# Patient Record
Sex: FEMALE | Race: WHITE | NOT HISPANIC OR LATINO | Employment: OTHER | URBAN - METROPOLITAN AREA
[De-identification: names, ages, dates, MRNs, and addresses within clinical notes are randomized per-mention and may not be internally consistent; named-entity substitution may affect disease eponyms.]

---

## 2017-01-25 ENCOUNTER — LAB VISIT (OUTPATIENT)
Dept: LAB | Facility: HOSPITAL | Age: 27
End: 2017-01-25
Attending: INTERNAL MEDICINE
Payer: COMMERCIAL

## 2017-01-25 DIAGNOSIS — C73 THYROID CANCER: ICD-10-CM

## 2017-01-25 DIAGNOSIS — E89.0 POSTOPERATIVE HYPOTHYROIDISM: ICD-10-CM

## 2017-01-25 LAB
T3FREE SERPL-MCNC: 2.3 PG/ML
T4 FREE SERPL-MCNC: 1.17 NG/DL
TSH SERPL DL<=0.005 MIU/L-ACNC: <0.01 UIU/ML

## 2017-01-25 PROCEDURE — 84439 ASSAY OF FREE THYROXINE: CPT

## 2017-01-25 PROCEDURE — 84481 FREE ASSAY (FT-3): CPT

## 2017-01-25 PROCEDURE — 36415 COLL VENOUS BLD VENIPUNCTURE: CPT | Mod: PO

## 2017-01-25 PROCEDURE — 84443 ASSAY THYROID STIM HORMONE: CPT

## 2017-03-08 ENCOUNTER — PATIENT MESSAGE (OUTPATIENT)
Dept: ENDOCRINOLOGY | Facility: CLINIC | Age: 27
End: 2017-03-08

## 2017-03-08 DIAGNOSIS — E89.0 POST-SURGICAL HYPOTHYROIDISM: Primary | ICD-10-CM

## 2017-03-09 ENCOUNTER — LAB VISIT (OUTPATIENT)
Dept: LAB | Facility: HOSPITAL | Age: 27
End: 2017-03-09
Attending: INTERNAL MEDICINE
Payer: COMMERCIAL

## 2017-03-09 DIAGNOSIS — E89.0 POST-SURGICAL HYPOTHYROIDISM: ICD-10-CM

## 2017-03-09 LAB
T4 FREE SERPL-MCNC: 1.23 NG/DL
TSH SERPL DL<=0.005 MIU/L-ACNC: <0.01 UIU/ML

## 2017-03-09 PROCEDURE — 84439 ASSAY OF FREE THYROXINE: CPT

## 2017-03-09 PROCEDURE — 84443 ASSAY THYROID STIM HORMONE: CPT

## 2017-03-09 PROCEDURE — 36415 COLL VENOUS BLD VENIPUNCTURE: CPT | Mod: PO

## 2017-03-18 ENCOUNTER — PATIENT MESSAGE (OUTPATIENT)
Dept: ENDOCRINOLOGY | Facility: CLINIC | Age: 27
End: 2017-03-18

## 2017-03-18 DIAGNOSIS — E03.9 HYPOTHYROIDISM, UNSPECIFIED TYPE: Primary | ICD-10-CM

## 2017-03-20 RX ORDER — LIOTHYRONINE SODIUM 5 UG/1
TABLET ORAL
Qty: 90 TABLET | Refills: 3 | Status: SHIPPED | OUTPATIENT
Start: 2017-03-20 | End: 2017-04-17

## 2017-04-10 ENCOUNTER — HOSPITAL ENCOUNTER (OUTPATIENT)
Dept: RADIOLOGY | Facility: HOSPITAL | Age: 27
Discharge: HOME | End: 2017-04-10
Attending: INTERNAL MEDICINE
Payer: COMMERCIAL

## 2017-04-10 DIAGNOSIS — E89.0 POSTOPERATIVE HYPOTHYROIDISM: ICD-10-CM

## 2017-04-10 DIAGNOSIS — C73 THYROID CANCER: ICD-10-CM

## 2017-04-10 PROCEDURE — 76536 US EXAM OF HEAD AND NECK: CPT | Mod: TC,PO

## 2017-04-10 PROCEDURE — 76536 US EXAM OF HEAD AND NECK: CPT | Mod: 26,,, | Performed by: RADIOLOGY

## 2017-04-17 ENCOUNTER — OFFICE VISIT (OUTPATIENT)
Dept: ENDOCRINOLOGY | Facility: CLINIC | Age: 27
End: 2017-04-17
Payer: COMMERCIAL

## 2017-04-17 VITALS
SYSTOLIC BLOOD PRESSURE: 106 MMHG | HEIGHT: 62 IN | BODY MASS INDEX: 18.68 KG/M2 | WEIGHT: 101.5 LBS | DIASTOLIC BLOOD PRESSURE: 68 MMHG | HEART RATE: 72 BPM

## 2017-04-17 DIAGNOSIS — E03.9 HYPOTHYROIDISM, UNSPECIFIED TYPE: ICD-10-CM

## 2017-04-17 DIAGNOSIS — C73 THYROID CANCER: Primary | ICD-10-CM

## 2017-04-17 PROCEDURE — 99999 PR PBB SHADOW E&M-EST. PATIENT-LVL III: CPT | Mod: PBBFAC,,, | Performed by: INTERNAL MEDICINE

## 2017-04-17 PROCEDURE — 99214 OFFICE O/P EST MOD 30 MIN: CPT | Mod: S$GLB,,, | Performed by: INTERNAL MEDICINE

## 2017-04-17 PROCEDURE — 1160F RVW MEDS BY RX/DR IN RCRD: CPT | Mod: S$GLB,,, | Performed by: INTERNAL MEDICINE

## 2017-04-17 RX ORDER — THYROID 90 MG/1
90 TABLET ORAL DAILY
Qty: 30 TABLET | Refills: 6 | Status: SHIPPED | OUTPATIENT
Start: 2017-04-17 | End: 2017-06-04

## 2017-04-17 NOTE — MR AVS SNAPSHOT
[facility]  [address]  [facility] [address]  Phone: [phone]  Fax: [phone]                  Idalia Lee   2017 1:00 PM   Office Visit    Description:  Female : 1990   Provider:  Bradly Rosa DO   Department:  [facility] - Endocrinology           Reason for Visit     Thyroid Cancer           Diagnoses this Visit        Comments    Thyroid cancer    -  Primary     Hypothyroidism, unspecified type                To Do List           Future Appointments        Provider Department Dept Phone    2017 10:35 AM LAB, [place] [facility] [phone]    2017 10:00 AM LAB, [place] [facility] [phone]    2017 10:00 AM LAB, [place] [facility] [phone]    10/5/2017 9:30 AM Bradly Rosa DO [facility] [phone]      Goals (5 Years of Data)     None       These Medications        Disp Refills Start End    thyroid, pork, (ARMOUR THYROID) 90 mg Tab 30 tablet 6 2017    Take 1 tablet (90 mg total) by mouth once daily. - Oral    Pharmacy: [id number] [address] #: [phone]         [facility]     [facility] Nurse Care Line -  Assistance  Unless otherwise directed by your provider, please contact [facility], our nurse care line that is available for  assistance.     Registered nurses in the [facility] provide: appointment scheduling, clinical advisement, health education, and other advisory services.  Call: [phone] (toll free)               Medications           START taking these NEW medications        Refills    thyroid, pork, (ARMOUR THYROID) 90 mg Tab 6    Sig: Take 1 tablet (90 mg total) by mouth once daily.    Class: Normal    Route: Oral      STOP taking these medications     SYNTHROID 125 mcg tablet TAKE 1 TABLET DAILY 6 DAYS A WEEK AND ONE-HALF TABLET  "ON DAY 7    liothyronine (CYTOMEL) 5 MCG Tab 10 mg in AM           Verify that the below list of medications is an accurate representation of the medications you are currently taking.  If none reported, the list may be blank. If incorrect, please contact your healthcare provider. Carry this list with you in case of emergency.           Current Medications     multivitamin capsule Take 1 capsule by mouth once daily.    thyroid, pork, (ARMOUR THYROID) 90 mg Tab Take 1 tablet (90 mg total) by mouth once daily.           Clinical Reference Information           Your Vitals Were     BP Pulse Height Weight BMI    106/68 (BP Location: Right arm, Patient Position: Sitting, BP Method: Manual) 72 5' 2" (1.575 m) 46.1 kg (101 lb 8.4 oz) 18.57 kg/m2      Blood Pressure          Most Recent Value    BP  106/68      Allergies as of 4/17/2017     E-mycin [Erythromycin]    Sulfa (Sulfonamide Antibiotics)    Ceclor [Cefaclor]      Immunizations Administered on Date of Encounter - 4/17/2017     None      Orders Placed During Today's Visit     Future Labs/Procedures Expected by Expires    Thyroglobulin  4/17/2017 4/17/2018    Recurring Lab Work Interval Expires    T3, free   4/17/2018    T4, free   4/17/2018    TSH   4/17/2018      Language Assistance Services     ATTENTION: Language assistance services are available, free of charge. Please call [phone].      ATENCIÓN: Si habla español, tiene a cesar disposición servicios gratuitos de asistencia lingüística. Llame al [phone].     CHÚ Ý: N?u b?n nói Ti?ng Vi?t, có các d?ch v? h? tr? ngôn ng? mi?n phí dành cho b?n. G?i s? [phone].         [facility] complies with applicable Federal civil rights laws and does not discriminate on the basis of race, color, national origin, age, disability, or sex.        "

## 2017-04-17 NOTE — PROGRESS NOTES
CHIEF COMPLAINT: Papillary Thyroid Cancer   27 year old being seen as a f/u. S/P total thyroidectomy 6/13. She received 100 mCi on 9/13 (She received tracer scan prior to determine dose). On synthroid 125 mcg 6 days a week and 1/2 tablet on day 7 and cytomel 10 mcg daily. She is having some fatigue. No palpitations. No tremors.                   FINAL PATHOLOGIC DIAGNOSIS   1. 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   2. THYROID (LEFT LOBE, CLINICAL): NO TUMOR SEEN.   THYROID CANCER CASE SUMMARY   Thyroid gland resection   Procedure: right lobectomy   Specimen integrity: separate right and left lobes   Specimen size: 6.0 cm   Tumor focality: single focus   Tumor laterality: right   Tumor size: 2.0 cm   Histologic type: papillary carcinoma, tall cell variant   Margins: not involved; closest margin < 0.1cm   Tumor capsule: totally encapsulated   Tumor capsular invasion: minimally invasive   Lymph-vascular invasion: not identified   Extrathyroid extension: not identified   Pathologic stage: I (pT1b NX)     PAST MEDICAL HISTORY/PAST SURGICAL HISTORY: Reviewed in [employer]     SOCIAL HISTORY: No T/A. .     FAMILY HISTORY: No thyroid cancer. Distant relative had hyperthyroidism.     MEDICATIONS/ALLERGIES: The patient's MedCard has been updated and reviewed.       ROS:   Constitutional: No fatigue.    Eyes: No recent visual changes   ENT: No dysphagia   Cardiovascular: Denies current anginal symptoms   Respiratory: Denies current respiratory difficulty   Gastrointestinal: No N/V   GenitoUrinary - No dysuria   Skin: No new skin rash   Neurologic: No focal neurologic complaints   Remainder ROS negative       PE:   GENERAL: Well developed, well nourished.   PSYCH: appropriate mood and affect   EYES: PERRL, EOM intact.   ENT: Nares patent, oropharynx clear, mucosa pink,   NECK: Supple, trachea midline, surgical scar intact. No LAD  CHEST: Resp even and unlabored, CTA bilateral.    CARDIAC: RRR, S1, S2 heard, no murmurs, rubs, S3, or S4     Results for CARLITA WORRELL (MRN [id number]) as of 4/17/2017 13:17   Ref. Range 4/10/2017 08:09   TSH Latest Ref Range: 0.400 - 4.000 uIU/mL <0.010 (L)   T3, Free Latest Ref Range: 2.3 - 4.2 pg/mL 2.5   Free T4 Latest Ref Range: 0.71 - 1.51 ng/dL 1.17   Thyroglobulin Interpretation Unknown SEE BELOW   Thyroglobulin Antibody Screen Latest Ref Range: <4.0 IU/mL 84 (H)   Thyroglobulin, Tumor Marker Latest Units: ng/mL <0.1     THYROID US:  Technique: Ultrasound interrogation of the thyroid gland was performed utilizing grayscale and color-flow imaging.    Comparison: Thyroid ultrasound-6/24/2016    Findings:    The patient is status post total thyroidectomy. No recurrent nodule or residual thyroid tissue in the thyroid fossa.  No pathologically enlarged lymph nodes in the left or right neck.       Impression       Status post total thyroidectomy.  No residual thyroid tissue or recurrent nodule in the thyroid fossa.                ASSESSMENT/PLAN:   1. 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 TG Ab +. With minimal invasion of capsule but within margins. S/P 100 mCi. Post Tx WBS was normal. Her Tg Ab are elevated. 1 year WBS shows no iodine avid disease. AB were increased, so PET scan done 10/14 which was negative. Tg decreased. Also seeing Dr. Sharp who does natural medicine. She would like to take armour. Discussed risks and benefits of armour especially with thyroid cancer. Also discussed risks of suppressed TSH. Will change to armour 90 mg. If Tg Ab increased will need to change back.     2. Hypothyroidism- See # 1. Monitor for hyperthyroid symptoms.     FOLLOWUP:  TSH, Ft4, Ft3- 6 weeks  F/U 6 months with TSH, FT4, Ft3, Tg

## 2017-04-30 ENCOUNTER — PATIENT MESSAGE (OUTPATIENT)
Dept: ENDOCRINOLOGY | Facility: CLINIC | Age: 27
End: 2017-04-30

## 2017-05-29 ENCOUNTER — PATIENT MESSAGE (OUTPATIENT)
Dept: ENDOCRINOLOGY | Facility: CLINIC | Age: 27
End: 2017-05-29

## 2017-06-02 ENCOUNTER — LAB VISIT (OUTPATIENT)
Dept: LAB | Facility: HOSPITAL | Age: 27
End: 2017-06-02
Attending: INTERNAL MEDICINE
Payer: COMMERCIAL

## 2017-06-02 DIAGNOSIS — E03.9 HYPOTHYROIDISM, UNSPECIFIED TYPE: ICD-10-CM

## 2017-06-02 LAB
T3FREE SERPL-MCNC: 3.4 PG/ML
T4 FREE SERPL-MCNC: 1.24 NG/DL
TSH SERPL DL<=0.005 MIU/L-ACNC: <0.01 UIU/ML

## 2017-06-02 PROCEDURE — 84481 FREE ASSAY (FT-3): CPT

## 2017-06-02 PROCEDURE — 84439 ASSAY OF FREE THYROXINE: CPT

## 2017-06-02 PROCEDURE — 84443 ASSAY THYROID STIM HORMONE: CPT

## 2017-06-02 PROCEDURE — 36415 COLL VENOUS BLD VENIPUNCTURE: CPT | Mod: PO

## 2017-06-04 ENCOUNTER — TELEPHONE (OUTPATIENT)
Dept: ENDOCRINOLOGY | Facility: CLINIC | Age: 27
End: 2017-06-04

## 2017-06-04 DIAGNOSIS — E03.9 HYPOTHYROIDISM, UNSPECIFIED TYPE: Primary | ICD-10-CM

## 2017-06-04 RX ORDER — THYROID 60 MG/1
60 TABLET ORAL DAILY
Qty: 30 TABLET | Refills: 3 | Status: SHIPPED | OUTPATIENT
Start: 2017-06-04 | End: 2017-06-17

## 2017-06-04 NOTE — TELEPHONE ENCOUNTER
Let her know on too much armour. Decrease from [place] 90 mg to 60 mg daily. Check TSH, FT4, FT3 6 weeks

## 2017-06-05 ENCOUNTER — TELEPHONE (OUTPATIENT)
Dept: ENDOCRINOLOGY | Facility: CLINIC | Age: 27
End: 2017-06-05

## 2017-06-05 NOTE — TELEPHONE ENCOUNTER
----- Message from Melisa Mccurdy sent at 6/5/2017  3:18 PM CDT -----  Contact: self   Placed call to pod, patient missed call from your office please call back at [phone] (rjis)

## 2017-06-05 NOTE — TELEPHONE ENCOUNTER
Spoke with pt, scheduled 6 week lab repeat, aware of new dose of [place], requested labs to be release in [facility], released as requested.

## 2017-06-14 ENCOUNTER — PATIENT MESSAGE (OUTPATIENT)
Dept: ENDOCRINOLOGY | Facility: CLINIC | Age: 27
End: 2017-06-14

## 2017-06-14 DIAGNOSIS — E03.9 HYPOTHYROIDISM, UNSPECIFIED TYPE: Primary | ICD-10-CM

## 2017-06-15 ENCOUNTER — LAB VISIT (OUTPATIENT)
Dept: LAB | Facility: HOSPITAL | Age: 27
End: 2017-06-15
Attending: INTERNAL MEDICINE
Payer: COMMERCIAL

## 2017-06-15 ENCOUNTER — TELEPHONE (OUTPATIENT)
Dept: OBSTETRICS AND GYNECOLOGY | Facility: CLINIC | Age: 27
End: 2017-06-15

## 2017-06-15 ENCOUNTER — TELEPHONE (OUTPATIENT)
Dept: ENDOCRINOLOGY | Facility: CLINIC | Age: 27
End: 2017-06-15

## 2017-06-15 DIAGNOSIS — E03.9 HYPOTHYROIDISM, UNSPECIFIED TYPE: ICD-10-CM

## 2017-06-15 DIAGNOSIS — N91.2 AMENORRHEA: Primary | ICD-10-CM

## 2017-06-15 DIAGNOSIS — E03.9 HYPOTHYROIDISM, UNSPECIFIED TYPE: Primary | ICD-10-CM

## 2017-06-15 DIAGNOSIS — Z32.01 POSITIVE PREGNANCY TEST: Primary | ICD-10-CM

## 2017-06-15 LAB — HCG SERPL QL: POSITIVE

## 2017-06-15 PROCEDURE — 36415 COLL VENOUS BLD VENIPUNCTURE: CPT | Mod: PO

## 2017-06-15 PROCEDURE — 84703 CHORIONIC GONADOTROPIN ASSAY: CPT | Mod: PO

## 2017-06-15 NOTE — TELEPHONE ENCOUNTER
Spoke with pt. Pt states she works near  and wants to cancel with the women's group. She was told the Women's group works closely with . Pt offered/scheduled for appt 6/19 at 11:30am. Pt advised [facility] did not have avail until 3:00pm that day. Pt would like to r/s to the next day instead at [facility]. Appt offered/scheduled at 8:30am. Pt aware time/location. Willing to travel. Appt offered/scheduled for [facility] right after visit with . Time/location verified with pt. Pt would like for me to cancel appt's with Women's group - APPT'S ON 6/20 FOR WOMEN[facility]S GROUP CANCELLED WHILE PT ON THE LINE.    Pt desires appt slips not be mailed to home - she has not told her  and wants it to be a SURPRISE. Will check [facility] account. Pt wanted me to note that she is also having her hormones tested on tomorrow - Voiced understanding

## 2017-06-15 NOTE — TELEPHONE ENCOUNTER
New ob pt - pt went to her endocrinologist and had some bloodwork done and found out she was pregnant. They did not tell her how far along she is and she has not had a period in 6 months. Pt would like to come in ASAP because she had thyroid cancer and had her thyroid removed and is unsure of how far along she is. Pt is scheduled for an appt and u/s on 6/20. Please put orders in for u/s.

## 2017-06-15 NOTE — TELEPHONE ENCOUNTER
----- Message from Sunshine Calloway sent at 6/15/2017  2:25 PM CDT -----  Contact: self  Pt referred by Dr Karen Tomlinson, LMP  (haven't had a cycle in about 6 months) pt have irregular cycles, pt had positive pregnancy test 06/15/17, pt calling for initial OB appt, pt can be reached at [phone].

## 2017-06-15 NOTE — TELEPHONE ENCOUNTER
Her pregnancy test was positive. Needs TSH, FT4, FT3 today or tomorrow. and monthly x 2. F/U after last TFT    I discussed her test result with her. And she will be calling her OB  Discussed switching back to synthroid after she does labs.

## 2017-06-16 ENCOUNTER — LAB VISIT (OUTPATIENT)
Dept: LAB | Facility: HOSPITAL | Age: 27
End: 2017-06-16
Attending: INTERNAL MEDICINE
Payer: COMMERCIAL

## 2017-06-16 DIAGNOSIS — C73 THYROID CANCER: ICD-10-CM

## 2017-06-16 DIAGNOSIS — E03.9 HYPOTHYROIDISM, UNSPECIFIED TYPE: ICD-10-CM

## 2017-06-16 LAB
T3FREE SERPL-MCNC: 3.3 PG/ML
T4 FREE SERPL-MCNC: 1.08 NG/DL
TSH SERPL DL<=0.005 MIU/L-ACNC: 0.01 UIU/ML

## 2017-06-16 PROCEDURE — 84481 FREE ASSAY (FT-3): CPT

## 2017-06-16 PROCEDURE — 36415 COLL VENOUS BLD VENIPUNCTURE: CPT | Mod: PO

## 2017-06-16 PROCEDURE — 84443 ASSAY THYROID STIM HORMONE: CPT

## 2017-06-16 PROCEDURE — 84439 ASSAY OF FREE THYROXINE: CPT

## 2017-06-17 ENCOUNTER — PATIENT MESSAGE (OUTPATIENT)
Dept: ENDOCRINOLOGY | Facility: CLINIC | Age: 27
End: 2017-06-17

## 2017-06-17 DIAGNOSIS — E03.9 HYPOTHYROIDISM, UNSPECIFIED TYPE: Primary | ICD-10-CM

## 2017-06-17 RX ORDER — LEVOTHYROXINE SODIUM 112 UG/1
112 TABLET ORAL DAILY
Qty: 30 TABLET | Refills: 11 | Status: SHIPPED | OUTPATIENT
Start: 2017-06-17 | End: 2017-06-27 | Stop reason: SDUPTHER

## 2017-06-17 NOTE — TELEPHONE ENCOUNTER
Will stop armour and change to synthroid 112 mcg. Check TSH in 4 weeks and 8 weeks and f/u after the 8 weeks lab

## 2017-06-20 ENCOUNTER — PROCEDURE VISIT (OUTPATIENT)
Dept: OBSTETRICS AND GYNECOLOGY | Facility: CLINIC | Age: 27
End: 2017-06-20
Payer: COMMERCIAL

## 2017-06-20 ENCOUNTER — INITIAL PRENATAL (OUTPATIENT)
Dept: OBSTETRICS AND GYNECOLOGY | Facility: CLINIC | Age: 27
End: 2017-06-20
Payer: COMMERCIAL

## 2017-06-20 ENCOUNTER — LAB VISIT (OUTPATIENT)
Dept: LAB | Facility: OTHER | Age: 27
End: 2017-06-20
Attending: OBSTETRICS & GYNECOLOGY
Payer: COMMERCIAL

## 2017-06-20 VITALS
SYSTOLIC BLOOD PRESSURE: 110 MMHG | WEIGHT: 105.19 LBS | BODY MASS INDEX: 19.23 KG/M2 | DIASTOLIC BLOOD PRESSURE: 60 MMHG

## 2017-06-20 DIAGNOSIS — N91.2 AMENORRHEA: ICD-10-CM

## 2017-06-20 DIAGNOSIS — N91.5 OLIGOMENORRHEA: Primary | ICD-10-CM

## 2017-06-20 DIAGNOSIS — Z36.89 ESTABLISH GESTATIONAL AGE, ULTRASOUND: ICD-10-CM

## 2017-06-20 DIAGNOSIS — Z32.01 POSITIVE PREGNANCY TEST: ICD-10-CM

## 2017-06-20 DIAGNOSIS — N91.5 OLIGOMENORRHEA: ICD-10-CM

## 2017-06-20 LAB
ABO + RH BLD: NORMAL
BASOPHILS # BLD AUTO: 0.02 K/UL
BASOPHILS NFR BLD: 0.2 %
BLD GP AB SCN CELLS X3 SERPL QL: NORMAL
BLOOD GROUP ANTIBODIES SERPL: NORMAL
DAT IGG-SP REAG RBC-IMP: NORMAL
DIFFERENTIAL METHOD: ABNORMAL
EOSINOPHIL # BLD AUTO: 0.1 K/UL
EOSINOPHIL NFR BLD: 0.5 %
ERYTHROCYTE [DISTWIDTH] IN BLOOD BY AUTOMATED COUNT: 12.4 %
HCT VFR BLD AUTO: 35.7 %
HGB BLD-MCNC: 12 G/DL
LYMPHOCYTES # BLD AUTO: 1.4 K/UL
LYMPHOCYTES NFR BLD: 14.5 %
MCH RBC QN AUTO: 30.3 PG
MCHC RBC AUTO-ENTMCNC: 33.6 %
MCV RBC AUTO: 90 FL
MONOCYTES # BLD AUTO: 0.7 K/UL
MONOCYTES NFR BLD: 7.8 %
NEUTROPHILS # BLD AUTO: 7.1 K/UL
NEUTROPHILS NFR BLD: 76.9 %
PLATELET # BLD AUTO: 217 K/UL
PMV BLD AUTO: 10.2 FL
RBC # BLD AUTO: 3.96 M/UL
WBC # BLD AUTO: 9.3 K/UL

## 2017-06-20 PROCEDURE — 87591 N.GONORRHOEAE DNA AMP PROB: CPT

## 2017-06-20 PROCEDURE — 86870 RBC ANTIBODY IDENTIFICATION: CPT

## 2017-06-20 PROCEDURE — 86905 BLOOD TYPING RBC ANTIGENS: CPT

## 2017-06-20 PROCEDURE — 87086 URINE CULTURE/COLONY COUNT: CPT

## 2017-06-20 PROCEDURE — 99999 PR PBB SHADOW E&M-EST. PATIENT-LVL III: CPT | Mod: PBBFAC,,, | Performed by: OBSTETRICS & GYNECOLOGY

## 2017-06-20 PROCEDURE — 86703 HIV-1/HIV-2 1 RESULT ANTBDY: CPT

## 2017-06-20 PROCEDURE — 88175 CYTOPATH C/V AUTO FLUID REDO: CPT

## 2017-06-20 PROCEDURE — 87340 HEPATITIS B SURFACE AG IA: CPT

## 2017-06-20 PROCEDURE — 86762 RUBELLA ANTIBODY: CPT

## 2017-06-20 PROCEDURE — 86900 BLOOD TYPING SEROLOGIC ABO: CPT

## 2017-06-20 PROCEDURE — 86850 RBC ANTIBODY SCREEN: CPT

## 2017-06-20 PROCEDURE — 86592 SYPHILIS TEST NON-TREP QUAL: CPT

## 2017-06-20 PROCEDURE — 36415 COLL VENOUS BLD VENIPUNCTURE: CPT

## 2017-06-20 PROCEDURE — 86880 COOMBS TEST DIRECT: CPT

## 2017-06-20 PROCEDURE — 76817 TRANSVAGINAL US OBSTETRIC: CPT | Mod: S$GLB,,, | Performed by: OBSTETRICS & GYNECOLOGY

## 2017-06-20 PROCEDURE — 99204 OFFICE O/P NEW MOD 45 MIN: CPT | Mod: S$GLB,,, | Performed by: OBSTETRICS & GYNECOLOGY

## 2017-06-20 PROCEDURE — 85025 COMPLETE CBC W/AUTO DIFF WBC: CPT

## 2017-06-20 RX ORDER — LIOTHYRONINE SODIUM 5 UG/1
15 TABLET ORAL DAILY
COMMUNITY
End: 2017-06-27 | Stop reason: SDUPTHER

## 2017-06-20 NOTE — PROGRESS NOTES
CC: Absence of menses    Idalia Lee is a 27 y.o. female  presents with complaint of absence of menstruation.  She denies nausea/vomIting/abdominal pain/bleeding. Fatigue.   UPT is positive.   Cycles are not regular.   She was on progesterone.   NO cycle since . Unknown LMP      Past Medical History:   Diagnosis Date    Anxiousness     Cancer     papillary thyroid    Headache     Otitis media     PONV (postoperative nausea and vomiting)     Post-surgical hypothyroidism 2013    Sinusitis      Past Surgical History:   Procedure Laterality Date    MOUTH SURGERY      extraction of [place] teeth    THYROID SURGERY      TOTAL THYROIDECTOMY      6/10/13     Social History     Social History    Marital status:      Spouse name: N/A    Number of children: N/A    Years of education: N/A     Social History Main Topics    Smoking status: Never Smoker    Smokeless tobacco: Never Used    Alcohol use Yes      Comment: social/rare - not since + pregnancy    Drug use: No    Sexual activity: Yes     Partners: Male     Other Topics Concern    None     Social History Narrative    None     Family History   Problem Relation Age of Onset    Breast cancer Neg Hx     Colon cancer Neg Hx     Ovarian cancer Neg Hx      OB History    Para Term  AB Living   1        SAB TAB Ectopic Multiple Live Births             # Outcome Date GA Lbr Nj/2nd Weight Sex Delivery Anes PTL Lv   1 Current                   /60   Wt 47.7 kg (105 lb 2.6 oz)   LMP  (LMP Unknown)   BMI 19.23 kg/m²     ROS:  GENERAL: Denies weight gain or weight loss. Feeling well overall.   SKIN: Denies rash or lesions.   HEAD: Denies head injury or headache.   NODES: Denies enlarged lymph nodes.   CHEST: Denies chest pain or shortness of breath.   CARDIOVASCULAR: Denies palpitations or left sided chest pain.   ABDOMEN: No abdominal pain, constipation, diarrhea, nausea, vomiting or rectal bleeding.    URINARY: No frequency, dysuria, hematuria, or burning on urination.  REPRODUCTIVE: See HPI.   BREASTS: The patient performs breast self-examination and denies pain, lumps, or nipple discharge.   HEMATOLOGIC: No easy bruisability or excessive bleeding.   MUSCULOSKELETAL: Denies joint pain or swelling.   NEUROLOGIC: Denies syncope or weakness.   PSYCHIATRIC: Denies depression, anxiety or mood swings.    PE:   APPEARANCE: Well nourished, well developed, in no acute distress.  AFFECT: WNL, alert and oriented x 3.  SKIN: No acne or hirsutism.  NECK: Neck symmetric without masses or thyromegaly.  NODES: No inguinal, cervical, axillary or femoral lymph node enlargement.  CHEST: Good respiratory effort.   ABDOMEN: Soft. No tenderness or masses. No hepatosplenomegaly. No hernias.  BREASTS: Symmetrical, no skin changes or visible lesions. No palpable masses, nipple discharge bilaterally.  PELVIC: Normal external female genitalia without lesions. Normal hair distribution. Adequate perineal body, normal urethral meatus. Vagina moist and well rugated without lesions or discharge. Cervix pink, without lesions, discharge or tenderness. No significant cystocele or rectocele. Bimanual exam shows uterus is 7 weeks, regular, mobile and nontender. Adnexa without masses or tenderness.  EXTREMITIES: No edema.    All NEW OB labs and cultures    ASSESSMENT and PLAN:    ICD-10-CM ICD-9-CM    1. Oligomenorrhea N91.5 626.1 HIV-1 and HIV-2 antibodies      RPR      Hepatitis B surface antigen      Type & Screen      Rubella antibody, IgG      Urine culture      CBC auto differential      C. trachomatis/N. gonorrhoeae by AMP DNA Cervix         Patient was counseled today on proper weight gain based on the [facility]'s recommendations based on her pre-pregnancy weight. Discussed foods to avoid in pregnancy (i.e. sushi, fish that are high in mercury, deli meat, and unpasteurized cheeses). Discussed prenatal vitamin options (i.e. stool  softener, DHA). Contingency screen offered - patient declines for now  Confirm EDC by US today    F/U in four weeks

## 2017-06-21 LAB
BACTERIA UR CULT: NO GROWTH
C TRACH DNA SPEC QL NAA+PROBE: NOT DETECTED
HBV SURFACE AG SERPL QL IA: NEGATIVE
HIV 1+2 AB+HIV1 P24 AG SERPL QL IA: NEGATIVE
N GONORRHOEA DNA SPEC QL NAA+PROBE: NOT DETECTED
RPR SER QL: NORMAL
RUBV IGG SER-ACNC: 81.4 IU/ML
RUBV IGG SER-IMP: REACTIVE

## 2017-06-22 ENCOUNTER — PATIENT MESSAGE (OUTPATIENT)
Dept: OBSTETRICS AND GYNECOLOGY | Facility: CLINIC | Age: 27
End: 2017-06-22

## 2017-06-23 ENCOUNTER — TELEPHONE (OUTPATIENT)
Dept: OBSTETRICS AND GYNECOLOGY | Facility: CLINIC | Age: 27
End: 2017-06-23

## 2017-06-23 NOTE — TELEPHONE ENCOUNTER
please review and respond to the following [employer] message:    Hey Dr Orellana,     How does my lab work look so far? I'm curious. Also of course want to make sure everything is ok.     Also, I haven't switched from my Vancourt (T4) back to Synthroid (T4) and am planning to do so this weekend as recommended by my endocrinologist. I just want to confirm that this is ok to do during my first trimester, or wait until second trimester. Let me know your thoughts.       Thanks!

## 2017-06-23 NOTE — TELEPHONE ENCOUNTER
All your labs were sent via [facility]. They look fine.  There is antibody in your blood but not high enough to worry about. We will re check later in preganancy. . It is fine to switch your thyroid medication.    RADHA Orellana mD

## 2017-06-27 ENCOUNTER — PATIENT MESSAGE (OUTPATIENT)
Dept: ENDOCRINOLOGY | Facility: CLINIC | Age: 27
End: 2017-06-27

## 2017-06-27 RX ORDER — LIOTHYRONINE SODIUM 5 UG/1
5 TABLET ORAL DAILY
Qty: 30 TABLET | Refills: 3 | Status: SHIPPED | OUTPATIENT
Start: 2017-06-27 | End: 2017-09-29 | Stop reason: SDUPTHER

## 2017-06-27 RX ORDER — LEVOTHYROXINE SODIUM 137 UG/1
137 TABLET ORAL DAILY
Qty: 30 TABLET | Refills: 11 | Status: SHIPPED | OUTPATIENT
Start: 2017-06-27 | End: 2017-07-19 | Stop reason: SDUPTHER

## 2017-06-27 NOTE — TELEPHONE ENCOUNTER
I didn't know she was still on cytomel. Just to clarify was she on cytomel while on armour?i though she had stopped cytomel. If pregnant better to use t4 than T3. If was on cytomel will need to increase synthroid

## 2017-06-27 NOTE — TELEPHONE ENCOUNTER
OK. Synthroid (T4) is better studied in pregnancy. Would limit amount of T3 and treat mainly with T4 during pregnancy. Maybe can limit amount of T3 and place on cytomel 5 mcg once daily. Increase synthroid from 112 mcg to 137 mcg. Continue currently scheduled labs

## 2017-06-27 NOTE — TELEPHONE ENCOUNTER
Spoke w/ pt, she's been on armour 180 mg w/ cytomel 15 mcg.  As of Saturday, she started the synthroid 112 mcg that you prescribed.  She's been on the cytomel all along.  Please advise.

## 2017-07-12 ENCOUNTER — TELEPHONE (OUTPATIENT)
Dept: OBSTETRICS AND GYNECOLOGY | Facility: CLINIC | Age: 27
End: 2017-07-12

## 2017-07-12 NOTE — TELEPHONE ENCOUNTER
----- Message from Tammie Gonzalez sent at 7/12/2017 11:35 AM CDT -----  X _1st Request  _  2nd Request  _  3rd Request    Who:CARLITA WORRELL [[id number]]    Why:Patient was returning a call back from the staff     What Number to Call Back:[phone]    When to Expect a call back: (Before the end of the day)   -- if call after 3:00 call back will be tomorrow.

## 2017-07-13 ENCOUNTER — PATIENT MESSAGE (OUTPATIENT)
Dept: OBSTETRICS AND GYNECOLOGY | Facility: CLINIC | Age: 27
End: 2017-07-13

## 2017-07-13 ENCOUNTER — TELEPHONE (OUTPATIENT)
Dept: OBSTETRICS AND GYNECOLOGY | Facility: CLINIC | Age: 27
End: 2017-07-13

## 2017-07-13 NOTE — TELEPHONE ENCOUNTER
----- Message from Ana Morin sent at 7/13/2017 10:27 AM CDT -----  Contact: Pt  x_ 1st Request  _ 2nd Request  _ 3rd Request    Who: Pt    Why: returning a call from staff    What Number to Call Back: [phone]    When to Expect a call back: (Before the end of the day)  -- if call after 3:00 call back will be tomorrow.

## 2017-07-17 ENCOUNTER — LAB VISIT (OUTPATIENT)
Dept: LAB | Facility: HOSPITAL | Age: 27
End: 2017-07-17
Attending: INTERNAL MEDICINE
Payer: COMMERCIAL

## 2017-07-17 DIAGNOSIS — E03.9 HYPOTHYROIDISM, UNSPECIFIED TYPE: ICD-10-CM

## 2017-07-17 LAB
T3FREE SERPL-MCNC: 2.1 PG/ML
T4 FREE SERPL-MCNC: 1.04 NG/DL
TSH SERPL DL<=0.005 MIU/L-ACNC: 0.01 UIU/ML

## 2017-07-17 PROCEDURE — 84439 ASSAY OF FREE THYROXINE: CPT

## 2017-07-17 PROCEDURE — 84481 FREE ASSAY (FT-3): CPT

## 2017-07-17 PROCEDURE — 84443 ASSAY THYROID STIM HORMONE: CPT

## 2017-07-17 PROCEDURE — 36415 COLL VENOUS BLD VENIPUNCTURE: CPT | Mod: PO

## 2017-07-18 ENCOUNTER — ROUTINE PRENATAL (OUTPATIENT)
Dept: OBSTETRICS AND GYNECOLOGY | Facility: CLINIC | Age: 27
End: 2017-07-18
Payer: COMMERCIAL

## 2017-07-18 ENCOUNTER — TELEPHONE (OUTPATIENT)
Dept: ENDOCRINOLOGY | Facility: CLINIC | Age: 27
End: 2017-07-18

## 2017-07-18 ENCOUNTER — PATIENT MESSAGE (OUTPATIENT)
Dept: OBSTETRICS AND GYNECOLOGY | Facility: CLINIC | Age: 27
End: 2017-07-18

## 2017-07-18 ENCOUNTER — PATIENT MESSAGE (OUTPATIENT)
Dept: ENDOCRINOLOGY | Facility: CLINIC | Age: 27
End: 2017-07-18

## 2017-07-18 VITALS — WEIGHT: 109.38 LBS | SYSTOLIC BLOOD PRESSURE: 100 MMHG | DIASTOLIC BLOOD PRESSURE: 58 MMHG | BODY MASS INDEX: 20 KG/M2

## 2017-07-18 DIAGNOSIS — E03.9 HYPOTHYROIDISM, UNSPECIFIED TYPE: ICD-10-CM

## 2017-07-18 DIAGNOSIS — Z3A.11 11 WEEKS GESTATION OF PREGNANCY: Primary | ICD-10-CM

## 2017-07-18 PROCEDURE — 99999 PR PBB SHADOW E&M-EST. PATIENT-LVL III: CPT | Mod: PBBFAC,,, | Performed by: OBSTETRICS & GYNECOLOGY

## 2017-07-18 PROCEDURE — 0502F SUBSEQUENT PRENATAL CARE: CPT | Mod: S$GLB,,, | Performed by: OBSTETRICS & GYNECOLOGY

## 2017-07-18 NOTE — PROGRESS NOTES
HERE for routine OB visit at 11 0/7 wks, with worsening HA. Tried to take tylenol for HA.  Will continue  With supportive care for now. Consider fioricet.  Denies vaginal bleeding, cramping.   NO LOF.  MFM scan at 19 weeks .

## 2017-07-19 ENCOUNTER — TELEPHONE (OUTPATIENT)
Dept: OBSTETRICS AND GYNECOLOGY | Facility: CLINIC | Age: 27
End: 2017-07-19

## 2017-07-19 ENCOUNTER — TELEPHONE (OUTPATIENT)
Dept: ENDOCRINOLOGY | Facility: CLINIC | Age: 27
End: 2017-07-19

## 2017-07-19 ENCOUNTER — PATIENT MESSAGE (OUTPATIENT)
Dept: ENDOCRINOLOGY | Facility: CLINIC | Age: 27
End: 2017-07-19

## 2017-07-19 DIAGNOSIS — E03.9 HYPOTHYROIDISM, UNSPECIFIED TYPE: Primary | ICD-10-CM

## 2017-07-19 RX ORDER — LEVOTHYROXINE SODIUM 125 UG/1
125 TABLET ORAL DAILY
Qty: 30 TABLET | Refills: 11 | Status: SHIPPED | OUTPATIENT
Start: 2017-07-19 | End: 2017-08-21 | Stop reason: SDUPTHER

## 2017-07-19 NOTE — TELEPHONE ENCOUNTER
Let her know on too much thyroid hormone. Decrease synthroid to 125 mcg. Check TSH, FT4, Total T3 in 4 weeks.

## 2017-07-20 ENCOUNTER — PATIENT MESSAGE (OUTPATIENT)
Dept: OBSTETRICS AND GYNECOLOGY | Facility: CLINIC | Age: 27
End: 2017-07-20

## 2017-07-20 NOTE — TELEPHONE ENCOUNTER
I sent in request for connected MOMS program. You can  the equipment from the O bar at [facility].  RADHA Orellana MD

## 2017-07-20 NOTE — TELEPHONE ENCOUNTER
2nd attempt: LMOVM again of cell # for pt to call back-she needs to decrease Synthroid to 125mcg daily (on too high of a dose currently per Dr Rosa based on recent lab results); repeat TSH in 4 weeks-we need to know when & where she wants to schedule.

## 2017-07-20 NOTE — TELEPHONE ENCOUNTER
Spoke with pt and informed her of decreasing 125 mcg and labs scheduled for 8/18/17 in [place]    Verbalized understanding

## 2017-07-25 ENCOUNTER — PATIENT OUTREACH (OUTPATIENT)
Dept: OTHER | Facility: OTHER | Age: 27
End: 2017-07-25

## 2017-07-29 ENCOUNTER — PATIENT MESSAGE (OUTPATIENT)
Dept: OBSTETRICS AND GYNECOLOGY | Facility: CLINIC | Age: 27
End: 2017-07-29

## 2017-07-31 ENCOUNTER — TELEPHONE (OUTPATIENT)
Dept: OBSTETRICS AND GYNECOLOGY | Facility: CLINIC | Age: 27
End: 2017-07-31

## 2017-07-31 NOTE — TELEPHONE ENCOUNTER
please review and respond to the following [employer] message:    Hi Dr Orellana,     I know it's Saturday and you won't see this til next week, but I wanted to check in bc I'm going on day 3 of another very severe headache. I have been in tears more than once and have been taking extra strength Tylenol around the clock (not even sure if that's the best idea) but nothing is cutting the pain.     It's an occipital right-side headache which is usually where I get my hormonal headaches, but lately they're just more frequent and more severe than I've had in the past.     I'm nervous about the Fioricet, bc I don't want to risk hurting our baby at all, but then again I'm not sure if Tylenol around the clock is safe either and I'm not getting any relief.     Do you think there's a chance these headaches may go away after I'm out of my first trimester and hormones stabilize a bit?     Have you prescribed Fioricet to other patients and had them do ok with it/no harmful effects on their baby??     Thanks so much.     Idalia

## 2017-07-31 NOTE — TELEPHONE ENCOUNTER
Sorry for your headaches.  I write for Fioricet often and they do fine.  Let me know if I should send in the medication.    For some women the headaches improve for some they worsen.    RADHA Orellana MD

## 2017-07-31 NOTE — TELEPHONE ENCOUNTER
Second attempt to introduce Ms. Lee to the program. LVM  requesting call back and will also send introduction details via MyChart, as explained in the voicemail.

## 2017-08-01 ENCOUNTER — PATIENT MESSAGE (OUTPATIENT)
Dept: OTHER | Facility: OTHER | Age: 27
End: 2017-08-01

## 2017-08-14 ENCOUNTER — ROUTINE PRENATAL (OUTPATIENT)
Dept: OBSTETRICS AND GYNECOLOGY | Facility: CLINIC | Age: 27
End: 2017-08-14
Payer: COMMERCIAL

## 2017-08-14 VITALS — WEIGHT: 113.31 LBS | BODY MASS INDEX: 20.73 KG/M2 | SYSTOLIC BLOOD PRESSURE: 90 MMHG | DIASTOLIC BLOOD PRESSURE: 60 MMHG

## 2017-08-14 DIAGNOSIS — Z3A.14 14 WEEKS GESTATION OF PREGNANCY: Primary | ICD-10-CM

## 2017-08-14 PROCEDURE — 99999 PR PBB SHADOW E&M-EST. PATIENT-LVL II: CPT | Mod: PBBFAC,,, | Performed by: OBSTETRICS & GYNECOLOGY

## 2017-08-14 PROCEDURE — 0502F SUBSEQUENT PRENATAL CARE: CPT | Mod: S$GLB,,, | Performed by: OBSTETRICS & GYNECOLOGY

## 2017-08-18 ENCOUNTER — LAB VISIT (OUTPATIENT)
Dept: LAB | Facility: HOSPITAL | Age: 27
End: 2017-08-18
Attending: INTERNAL MEDICINE
Payer: COMMERCIAL

## 2017-08-18 DIAGNOSIS — E03.9 HYPOTHYROIDISM, UNSPECIFIED TYPE: ICD-10-CM

## 2017-08-18 LAB
T3FREE SERPL-MCNC: 1.7 PG/ML
T4 FREE SERPL-MCNC: 0.97 NG/DL
TSH SERPL DL<=0.005 MIU/L-ACNC: 0.36 UIU/ML

## 2017-08-18 PROCEDURE — 84439 ASSAY OF FREE THYROXINE: CPT

## 2017-08-18 PROCEDURE — 84443 ASSAY THYROID STIM HORMONE: CPT

## 2017-08-18 PROCEDURE — 36415 COLL VENOUS BLD VENIPUNCTURE: CPT | Mod: PO

## 2017-08-18 PROCEDURE — 84481 FREE ASSAY (FT-3): CPT

## 2017-08-21 ENCOUNTER — PATIENT MESSAGE (OUTPATIENT)
Dept: ENDOCRINOLOGY | Facility: CLINIC | Age: 27
End: 2017-08-21

## 2017-08-21 ENCOUNTER — PATIENT MESSAGE (OUTPATIENT)
Dept: OBSTETRICS AND GYNECOLOGY | Facility: CLINIC | Age: 27
End: 2017-08-21

## 2017-08-21 ENCOUNTER — OFFICE VISIT (OUTPATIENT)
Dept: ENDOCRINOLOGY | Facility: CLINIC | Age: 27
End: 2017-08-21
Payer: COMMERCIAL

## 2017-08-21 VITALS
WEIGHT: 111.56 LBS | DIASTOLIC BLOOD PRESSURE: 58 MMHG | RESPIRATION RATE: 18 BRPM | HEART RATE: 80 BPM | HEIGHT: 62 IN | BODY MASS INDEX: 20.53 KG/M2 | SYSTOLIC BLOOD PRESSURE: 92 MMHG

## 2017-08-21 DIAGNOSIS — O99.280: ICD-10-CM

## 2017-08-21 DIAGNOSIS — E03.9: ICD-10-CM

## 2017-08-21 DIAGNOSIS — C73 THYROID CANCER: Primary | ICD-10-CM

## 2017-08-21 PROCEDURE — 99999 PR PBB SHADOW E&M-EST. PATIENT-LVL III: CPT | Mod: PBBFAC,,, | Performed by: INTERNAL MEDICINE

## 2017-08-21 PROCEDURE — 3008F BODY MASS INDEX DOCD: CPT | Mod: S$GLB,,, | Performed by: INTERNAL MEDICINE

## 2017-08-21 PROCEDURE — 99214 OFFICE O/P EST MOD 30 MIN: CPT | Mod: S$GLB,,, | Performed by: INTERNAL MEDICINE

## 2017-08-21 RX ORDER — LEVOTHYROXINE SODIUM 125 UG/1
125 TABLET ORAL DAILY
Qty: 30 TABLET | Refills: 11 | Status: SHIPPED | OUTPATIENT
Start: 2017-08-21 | End: 2017-10-23 | Stop reason: SDUPTHER

## 2017-08-21 NOTE — PROGRESS NOTES
(OB- Dr. Orellana)  CHIEF COMPLAINT: Papillary Thyroid Cancer   27 year old being seen as a f/u. S/P total thyroidectomy 6/13. She received 100 mCi on 9/13 (She received tracer scan prior to determine dose). On synthroid 125 mcg and cytomel 5 mcg daily. Currently 16 Weeks pregnant. N/V resolved. Taking prenatal vit separate from thyroid medication. Overall feeling better. No palpitations. No tremors. No diarrhea or constipation.         FINAL PATHOLOGIC DIAGNOSIS   1. 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   2. THYROID (LEFT LOBE, CLINICAL): NO TUMOR SEEN.   THYROID CANCER CASE SUMMARY   Thyroid gland resection   Procedure: right lobectomy   Specimen integrity: separate right and left lobes   Specimen size: 6.0 cm   Tumor focality: single focus   Tumor laterality: right   Tumor size: 2.0 cm   Histologic type: papillary carcinoma, tall cell variant   Margins: not involved; closest margin < 0.1cm   Tumor capsule: totally encapsulated   Tumor capsular invasion: minimally invasive   Lymph-vascular invasion: not identified   Extrathyroid extension: not identified   Pathologic stage: I (pT1b NX)     PAST MEDICAL HISTORY/PAST SURGICAL HISTORY: Reviewed in [facility]     SOCIAL HISTORY: No T/A. .     FAMILY HISTORY: No thyroid cancer. Distant relative had hyperthyroidism.     MEDICATIONS/ALLERGIES: The patient's MedCard has been updated and reviewed.       ROS:   Constitutional: No fatigue.    Eyes: No recent visual changes   ENT: No dysphagia   Cardiovascular: Denies current anginal symptoms   Respiratory: Denies current respiratory difficulty   Gastrointestinal: No N/V   GenitoUrinary - No dysuria   Skin: No new skin rash   Neurologic: No focal neurologic complaints   Remainder ROS negative       PE:   GENERAL: Well developed, well nourished.   NECK: Supple, trachea midline, surgical scar intact. No LAD  CHEST: Resp even and unlabored, CTA bilateral.   CARDIAC: RRR, S1, S2 heard,  no murmurs, rubs, S3, or S4        Results for CARLITA WORRELL (MRN [id number]) as of 8/21/2017 09:05   Ref. Range 8/18/2017 08:56   TSH Latest Ref Range: 0.400 - 4.000 uIU/mL 0.362 (L)   T3, Free Latest Ref Range: 2.3 - 4.2 pg/mL 1.7 (L)   Free T4 Latest Ref Range: 0.71 - 1.51 ng/dL 0.97           ASSESSMENT/PLAN:   1. 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 TG Ab +. With minimal invasion of capsule but within margins. S/P 100 mCi. Post Tx WBS was normal. Her Tg Ab are elevated. 1 year WBS shows no iodine avid disease. AB were increased, so PET scan done 10/14 which was negative.  TSH mildly suppressed. Discussed FT3 levels can be innacurate. Will mainly follow TSH. Will keep doses where they are for now. Also requirements increase throughout pregnancy FT4 not elevated.     2. Hypothyroidism- See # 1. Monitor for hyperthyroid symptoms.     3. 16 weeks pregnant    FOLLOWUP:  TSH, Ft4, Total T3- Monthly x 3  F/U 3 months after 3rd lab

## 2017-08-24 NOTE — PROGRESS NOTES
HERE for routine OB visit at 14 6/7 wks, with NO complaints.  Denies vaginal bleeding, cramping/ ctx, or LOF.  + FM.  FMC BID.   Ha- recommend fioricet PRN, in addition to tylenol if persists.   MFM scan for 19 weeks.

## 2017-09-11 ENCOUNTER — PATIENT MESSAGE (OUTPATIENT)
Dept: ENDOCRINOLOGY | Facility: CLINIC | Age: 27
End: 2017-09-11

## 2017-09-11 ENCOUNTER — OFFICE VISIT (OUTPATIENT)
Dept: MATERNAL FETAL MEDICINE | Facility: CLINIC | Age: 27
End: 2017-09-11
Attending: OBSTETRICS & GYNECOLOGY
Payer: COMMERCIAL

## 2017-09-11 ENCOUNTER — ROUTINE PRENATAL (OUTPATIENT)
Dept: OBSTETRICS AND GYNECOLOGY | Facility: CLINIC | Age: 27
End: 2017-09-11
Payer: COMMERCIAL

## 2017-09-11 VITALS
DIASTOLIC BLOOD PRESSURE: 70 MMHG | BODY MASS INDEX: 21.45 KG/M2 | SYSTOLIC BLOOD PRESSURE: 100 MMHG | WEIGHT: 117.31 LBS

## 2017-09-11 DIAGNOSIS — O28.3 ECHOGENIC INTRACARDIAC FOCUS OF FETUS ON PRENATAL ULTRASOUND: ICD-10-CM

## 2017-09-11 DIAGNOSIS — Z3A.19 19 WEEKS GESTATION OF PREGNANCY: ICD-10-CM

## 2017-09-11 DIAGNOSIS — Z3A.18 18 WEEKS GESTATION OF PREGNANCY: Primary | ICD-10-CM

## 2017-09-11 PROCEDURE — 99203 OFFICE O/P NEW LOW 30 MIN: CPT | Mod: S$GLB,,, | Performed by: OBSTETRICS & GYNECOLOGY

## 2017-09-11 PROCEDURE — 99999 PR PBB SHADOW E&M-EST. PATIENT-LVL II: CPT | Mod: PBBFAC,,, | Performed by: OBSTETRICS & GYNECOLOGY

## 2017-09-11 PROCEDURE — 3008F BODY MASS INDEX DOCD: CPT | Mod: S$GLB,,, | Performed by: OBSTETRICS & GYNECOLOGY

## 2017-09-11 PROCEDURE — 0502F SUBSEQUENT PRENATAL CARE: CPT | Mod: S$GLB,,, | Performed by: OBSTETRICS & GYNECOLOGY

## 2017-09-11 PROCEDURE — 76811 OB US DETAILED SNGL FETUS: CPT | Mod: S$GLB,,, | Performed by: OBSTETRICS & GYNECOLOGY

## 2017-09-11 NOTE — PROGRESS NOTES
HERE for routine OB visit at 36 5/7 wks, with NO complaints.  Denies vaginal bleeding, cramping/ ctx, or LOF.  + FM.  [facility]MC BID.   Cardiac echogenic focus - Considering materna T 21/  Quad screen.  F/U in four weeks.

## 2017-09-11 NOTE — LETTER
September 11, 2017      Nidhi Orellana MD  [address]  [address]  [facility] [address]           [facility]  [address]  [facility] [address]  Phone: [phone]          Patient: Idalia Lee   MR Number: [id number]   YOB: 1990   Date of Visit: 9/11/2017       Dear Dr. Nidhi Orellana:    Thank you for referring Idalia Lee to me for evaluation. Attached you will find relevant portions of my assessment and plan of care.    If you have questions, please do not hesitate to call me. I look forward to following Idalia Lee along with you.    Sincerely,    Adebayo Wright MD    Enclosure  CC:  No Recipients    If you would like to receive this communication electronically, please contact [email or web address] or [phone] to request more information on [employer] access.    For providers and/or their staff who would like to refer a patient to [facility], please contact us through our one-stop-shop provider referral line, [facility], at [phone].    If you feel you have received this communication in error or would no longer like to receive these types of communications, please e-mail [email or web address]

## 2017-09-11 NOTE — PROGRESS NOTES
EIF on ultrasound., We discussed the significance of an echogenic focus in the ventricle of the fetal heart. This is not a  structural abnormality per se and is not associated with congenital heart disease. It is usually seen as a normal variant in about 5% of pregnancies. Down syndrome fetuses have a higher incidence of echogenic foci than normal fetuses, therefore it is considered to be risk factor for Down syndrome (Likelihood ratio of 2).  In a woman with other risk factors for Down syndrome (advanced maternal age, elevated quad screen risk or presence of other markers), the presence of an echogenic focus may increases the relative risk of Down syndrome. If the patient is close to age 35, the presence of an echogenic focus may increase the risk for Down syndrome to a level seen in women 35 years old or older. In a younger woman (< age 35) or in a woman without other risk factors or markers for Down syndrome, the significance of an isolated echogenic focus is often not significant.  We reviewed the patient's age related risk of Trisomy 21 which is 1 in 1189. We discussed the modification of her screen related risk.  The patient was scheduled to have a quad screen drawn today. With the assistance of the  the patient was counseled regarding this finding. We reviewed the options of quad screen, NIPT, and amniocentesis. We reviewed the sensitivity and specificity and detection rates for Trisomy 21 for each of these tests. We discussed a 1 in 500 risk of pregnancy related loss or complication with an amniocentesis. She will discuss with Dr. Orellana which test to obtain if any.

## 2017-09-24 ENCOUNTER — PATIENT MESSAGE (OUTPATIENT)
Dept: ADMINISTRATIVE | Facility: OTHER | Age: 27
End: 2017-09-24

## 2017-09-28 ENCOUNTER — LAB VISIT (OUTPATIENT)
Dept: LAB | Facility: HOSPITAL | Age: 27
End: 2017-09-28
Attending: INTERNAL MEDICINE
Payer: COMMERCIAL

## 2017-09-28 DIAGNOSIS — C73 THYROID CANCER: ICD-10-CM

## 2017-09-28 DIAGNOSIS — E03.9 HYPOTHYROIDISM, UNSPECIFIED TYPE: ICD-10-CM

## 2017-09-28 DIAGNOSIS — Z3A.14 14 WEEKS GESTATION OF PREGNANCY: ICD-10-CM

## 2017-09-28 LAB
ABO + RH BLD: NORMAL
T3FREE SERPL-MCNC: 2 PG/ML
T4 FREE SERPL-MCNC: 0.88 NG/DL
TSH SERPL DL<=0.005 MIU/L-ACNC: 2.27 UIU/ML

## 2017-09-28 PROCEDURE — 84481 FREE ASSAY (FT-3): CPT

## 2017-09-28 PROCEDURE — 86900 BLOOD TYPING SEROLOGIC ABO: CPT

## 2017-09-28 PROCEDURE — 86901 BLOOD TYPING SEROLOGIC RH(D): CPT

## 2017-09-28 PROCEDURE — 84443 ASSAY THYROID STIM HORMONE: CPT

## 2017-09-28 PROCEDURE — 84439 ASSAY OF FREE THYROXINE: CPT

## 2017-09-28 PROCEDURE — 36415 COLL VENOUS BLD VENIPUNCTURE: CPT | Mod: PO

## 2017-09-28 PROCEDURE — 86800 THYROGLOBULIN ANTIBODY: CPT

## 2017-09-29 ENCOUNTER — PATIENT MESSAGE (OUTPATIENT)
Dept: ENDOCRINOLOGY | Facility: CLINIC | Age: 27
End: 2017-09-29

## 2017-09-29 ENCOUNTER — TELEPHONE (OUTPATIENT)
Dept: ENDOCRINOLOGY | Facility: CLINIC | Age: 27
End: 2017-09-29

## 2017-09-29 LAB
THRYOGLOBULIN INTERPRETATION: ABNORMAL
THYROGLOB AB SERPL-ACNC: 53 IU/ML
THYROGLOB SERPL-MCNC: <0.1 NG/ML

## 2017-09-29 RX ORDER — LIOTHYRONINE SODIUM 5 UG/1
5 TABLET ORAL 2 TIMES DAILY
Qty: 60 TABLET | Refills: 3 | Status: SHIPPED | OUTPATIENT
Start: 2017-09-29 | End: 2017-11-14 | Stop reason: SDUPTHER

## 2017-10-06 ENCOUNTER — TELEPHONE (OUTPATIENT)
Dept: OBSTETRICS AND GYNECOLOGY | Facility: CLINIC | Age: 27
End: 2017-10-06

## 2017-10-13 ENCOUNTER — ROUTINE PRENATAL (OUTPATIENT)
Dept: OBSTETRICS AND GYNECOLOGY | Facility: CLINIC | Age: 27
End: 2017-10-13
Payer: COMMERCIAL

## 2017-10-13 VITALS — SYSTOLIC BLOOD PRESSURE: 98 MMHG | BODY MASS INDEX: 22.14 KG/M2 | WEIGHT: 121.06 LBS | DIASTOLIC BLOOD PRESSURE: 64 MMHG

## 2017-10-13 DIAGNOSIS — E03.9 HYPOTHYROID IN PREGNANCY, ANTEPARTUM: ICD-10-CM

## 2017-10-13 DIAGNOSIS — Z3A.23 23 WEEKS GESTATION OF PREGNANCY: Primary | ICD-10-CM

## 2017-10-13 DIAGNOSIS — O99.280 HYPOTHYROID IN PREGNANCY, ANTEPARTUM: ICD-10-CM

## 2017-10-13 PROCEDURE — 99999 PR PBB SHADOW E&M-EST. PATIENT-LVL II: CPT | Mod: PBBFAC,,, | Performed by: OBSTETRICS & GYNECOLOGY

## 2017-10-13 PROCEDURE — 0502F SUBSEQUENT PRENATAL CARE: CPT | Mod: S$GLB,,, | Performed by: OBSTETRICS & GYNECOLOGY

## 2017-10-13 NOTE — PROGRESS NOTES
HERE for routine OB visit at 23 3/7 wks, with NO complaints.  Denies vaginal bleeding, cramping/ ctx, or LOF.  + FM.  FMC BID.  OB screen., CBC,  ABO for follow up on indirecto Meghna.  TSh WNL.  Considering [facility].  WIll follow up with me as needed.

## 2017-10-17 ENCOUNTER — PATIENT MESSAGE (OUTPATIENT)
Dept: OBSTETRICS AND GYNECOLOGY | Facility: CLINIC | Age: 27
End: 2017-10-17

## 2017-10-23 ENCOUNTER — LAB VISIT (OUTPATIENT)
Dept: LAB | Facility: HOSPITAL | Age: 27
End: 2017-10-23
Attending: INTERNAL MEDICINE
Payer: COMMERCIAL

## 2017-10-23 DIAGNOSIS — E03.9 HYPOTHYROIDISM, UNSPECIFIED TYPE: ICD-10-CM

## 2017-10-23 LAB
T3FREE SERPL-MCNC: 1.4 PG/ML
T4 FREE SERPL-MCNC: 0.73 NG/DL
TSH SERPL DL<=0.005 MIU/L-ACNC: 9.73 UIU/ML

## 2017-10-23 PROCEDURE — 84443 ASSAY THYROID STIM HORMONE: CPT

## 2017-10-23 PROCEDURE — 36415 COLL VENOUS BLD VENIPUNCTURE: CPT | Mod: PO

## 2017-10-23 PROCEDURE — 84481 FREE ASSAY (FT-3): CPT

## 2017-10-23 PROCEDURE — 84439 ASSAY OF FREE THYROXINE: CPT

## 2017-10-23 RX ORDER — LEVOTHYROXINE SODIUM 150 UG/1
150 TABLET ORAL DAILY
Qty: 30 TABLET | Refills: 11 | Status: SHIPPED | OUTPATIENT
Start: 2017-10-23 | End: 2017-11-27 | Stop reason: SDUPTHER

## 2017-10-23 NOTE — TELEPHONE ENCOUNTER
Spoke w/ pt, she has been taking the synthroid along w/ cytomel at the same time.  I advised her to take synthroid by itself (no food, drink or other meds--only small sip of water) and wait AT LEAST 30 mins before taking in anything else.  Should she still increase dose since she's been taking it incorrectly?

## 2017-10-23 NOTE — TELEPHONE ENCOUNTER
I tried to call pt and LMOR  PLease verify has not missed any doses and taking by itself. Also make sure taking prenatal vitamins seperately. TSH higher than expected. FT3 low but may be due to not enough T4.   Increase synthroid to 150 mcg.

## 2017-10-24 ENCOUNTER — PATIENT MESSAGE (OUTPATIENT)
Dept: OBSTETRICS AND GYNECOLOGY | Facility: CLINIC | Age: 27
End: 2017-10-24

## 2017-10-24 ENCOUNTER — TELEPHONE (OUTPATIENT)
Dept: OBSTETRICS AND GYNECOLOGY | Facility: CLINIC | Age: 27
End: 2017-10-24

## 2017-10-24 ENCOUNTER — PATIENT MESSAGE (OUTPATIENT)
Dept: OTHER | Facility: OTHER | Age: 27
End: 2017-10-24

## 2017-10-24 NOTE — TELEPHONE ENCOUNTER
Your Diabetic screen is normal- at 118  Your are mildly anemic which is common in pregnancy. Please take iron over the counter once daily,  In addition to prenatal vitamin daily

## 2017-10-24 NOTE — TELEPHONE ENCOUNTER
Patient is requesting her results from 10/23/17.    Good morning!! I wanted to follow up on my glucose screen and other lab work from yesterday and make sure everything looked ok. Thanks so much!     Idalia

## 2017-11-07 ENCOUNTER — INITIAL PRENATAL (OUTPATIENT)
Dept: OBSTETRICS AND GYNECOLOGY | Facility: CLINIC | Age: 27
End: 2017-11-07
Payer: COMMERCIAL

## 2017-11-07 VITALS — SYSTOLIC BLOOD PRESSURE: 98 MMHG | DIASTOLIC BLOOD PRESSURE: 60 MMHG | WEIGHT: 121.69 LBS | BODY MASS INDEX: 22.26 KG/M2

## 2017-11-07 DIAGNOSIS — O28.3 ECHOGENIC INTRACARDIAC FOCUS OF FETUS ON PRENATAL ULTRASOUND: ICD-10-CM

## 2017-11-07 PROCEDURE — 99999 PR PBB SHADOW E&M-EST. PATIENT-LVL II: CPT | Mod: PBBFAC,,, | Performed by: ADVANCED PRACTICE MIDWIFE

## 2017-11-07 PROCEDURE — 0502F SUBSEQUENT PRENATAL CARE: CPT | Mod: S$GLB,,, | Performed by: ADVANCED PRACTICE MIDWIFE

## 2017-11-07 NOTE — PROGRESS NOTES
Subjective:      Idalia Lee is being seen today for her first obstetrical visit with the [facility].  She is transferring care from Dr. Orellana within the [facility] system.  She is at 27w0d gestation by US . Her obstetrical history is significant for initial pos indirect Meghna, neg on repeat screen.  EIF on US.  Hx thyroidectomy, followed by her endocrinologist during pregnancy.  Has labs drawn and medication adjusted q month.  Records reviewed    Relationship with FOB and/or parenting partner: spouse, living together.     Patient does intend to breast feed.     Pregnancy history fully reviewed.     Menstrual History:  OB History      Para Term  AB Living    1              SAB TAB Ectopic Multiple Live Births                             The following portions of the patient's history were reviewed and updated as appropriate: allergies, current medications, past family history, past medical history, past social history, past surgical history and problem list.      Uterus: nontender, mobile, approx 28 week size, contour, and position. FHTs positive  Via doppler  Adnexa: no masses or tenderness  Anus/Perineum: normal appearance, with no lesions or discharge. Internal exam deferred.  Skin: Skin color, texture, turgor normal. No rashes or lesions  Lymph nodes: Cervical, supraclavicular, axillary, and inguinal nodes normal.     Assessment:     Pregnancy at 27 weeks       Plan:     Problem list reviewed and updated.  Continue prenatal vitamins daily.  Genetic screening  declined  U/S report reviewed.  Oriented to CN / [facility] unit care.  Follow up in 2 weeks.        1. Body mass index is 22.26 kg/m².  -- Discussed IOM recommended weight gain of:   Underweight Less than 18.5 28-40    Normal Weight 18.5-24.9  25-35    Overweight 25-29.9  15-25    Obese   30 and greater  11-20   -- Discussed criteria for delivery at [facility] r/t excessive pre-preg weight or excessive weight gain:   Pre-pregnancy BMI over  40 or excess pregnancy weight gain defined as:   Pre-preg BMI < 18.5; Excess weight gain = > 60 pound   Pre-preg BMI 18.5-24.9;  Excess weight gain = > 53 pounds   Pre-preg BMI 25-29.9;  Excess weight gain = > 38 pounds   Pre-preg BMI > 30;  Excess weight gain = > 30 pounds    2. Discussed nausea and vomiting in pregnancy if present  -- Education regarding lifestyle and dietary modifications  -- Reviewed use of B6/Unisom prn. Pt will notify us if no relief/worsening symptoms, will consider alternative therapies prn    3. Pregnancy education and counseling; handouts and booklet provided  -- She has already attended [facility] and has toured facility  -- Oriented to practice and anticipated prenatal course of care and how to contact us  -- Precautions/warning signs reviewed  -- Common complaints of pregnancy  -- Routine prenatal labs including HIV  -- Ultrasounds  -- Childbirth education/hospital/[facility] facilities  -- Nutrition, prepregnant BMI, and recommended weight gain  -- Toxoplasmosis precautions (Cats/Raw Meat)  -- Sexual activity and exercise  -- Environmental/Work hazards  -- Travel  -- Tobacco (Ask, Advise, Assess, Assist, and Arrange), as well as alcohol and drug use  -- Use of any medications (Including supplements, Vitamins, Herbs, or OTC Drugs)  -- Domestic violence screen negative    4.  Hx thyroidectomy :  Continue f/u and mangament with endocrinologist  5.  Reviewed warning signs, precautions, and how/when to contact us.        Updated Medication List:  Current Outpatient Prescriptions   Medication Sig Dispense Refill    Lactobacillus rhamnosus GG (CULTURELLE) 10 billion cell capsule Take 1 capsule by mouth once daily.      levothyroxine (SYNTHROID) 150 MCG tablet Take 1 tablet (150 mcg total) by mouth once daily. Brandname 30 tablet 11    liothyronine (CYTOMEL) 5 MCG Tab Take 1 tablet (5 mcg total) by mouth 2 (two) times daily. 60 tablet 3    PRENATAL VIT/IRON FUM/FOLIC AC (PRENATAL 1+1 ORAL)  Take by mouth.       No current facility-administered medications for this visit.

## 2017-11-14 RX ORDER — LIOTHYRONINE SODIUM 5 UG/1
5 TABLET ORAL 2 TIMES DAILY
Qty: 60 TABLET | Refills: 3 | Status: SHIPPED | OUTPATIENT
Start: 2017-11-14 | End: 2018-05-08 | Stop reason: SDUPTHER

## 2017-11-24 ENCOUNTER — ROUTINE PRENATAL (OUTPATIENT)
Dept: OBSTETRICS AND GYNECOLOGY | Facility: CLINIC | Age: 27
End: 2017-11-24
Payer: COMMERCIAL

## 2017-11-24 VITALS — SYSTOLIC BLOOD PRESSURE: 90 MMHG | DIASTOLIC BLOOD PRESSURE: 54 MMHG | BODY MASS INDEX: 23 KG/M2 | WEIGHT: 125.75 LBS

## 2017-11-24 DIAGNOSIS — O99.019 ANEMIA DURING PREGNANCY: ICD-10-CM

## 2017-11-24 DIAGNOSIS — Z34.03 ENCOUNTER FOR PRENATAL CARE IN THIRD TRIMESTER OF FIRST PREGNANCY: ICD-10-CM

## 2017-11-24 PROCEDURE — 99999 PR PBB SHADOW E&M-EST. PATIENT-LVL II: CPT | Mod: PBBFAC,,, | Performed by: ADVANCED PRACTICE MIDWIFE

## 2017-11-24 PROCEDURE — 0502F SUBSEQUENT PRENATAL CARE: CPT | Mod: S$GLB,,, | Performed by: ADVANCED PRACTICE MIDWIFE

## 2017-11-24 NOTE — PROGRESS NOTES
Just got back from [place] in [place].  Is aware that she is mildly anemic and is trying to increase her dietary sources of iron. Will repeat CBC at 36 weeks. Taking dehydrated beef liver.Is aware of concerns regarding liver as a detoxifying organ of the body. Floradex recommended.  Looking for a pediatrician. Has questions regarding oral Vit K. Encouraged to talk to her pediatrician about Vitamin K. Handout on  routines reviewed.   Gentle c/s options reviewed per request.  Taking Ignacio class with Velma Hendrix.

## 2017-11-27 ENCOUNTER — PATIENT MESSAGE (OUTPATIENT)
Dept: ENDOCRINOLOGY | Facility: CLINIC | Age: 27
End: 2017-11-27

## 2017-11-27 ENCOUNTER — TELEPHONE (OUTPATIENT)
Dept: ENDOCRINOLOGY | Facility: CLINIC | Age: 27
End: 2017-11-27

## 2017-11-27 ENCOUNTER — LAB VISIT (OUTPATIENT)
Dept: LAB | Facility: HOSPITAL | Age: 27
End: 2017-11-27
Attending: INTERNAL MEDICINE
Payer: COMMERCIAL

## 2017-11-27 DIAGNOSIS — E03.9 HYPOTHYROIDISM, UNSPECIFIED TYPE: Primary | ICD-10-CM

## 2017-11-27 DIAGNOSIS — E03.9: ICD-10-CM

## 2017-11-27 DIAGNOSIS — O99.280: ICD-10-CM

## 2017-11-27 DIAGNOSIS — C73 THYROID CANCER: ICD-10-CM

## 2017-11-27 LAB
T3 SERPL-MCNC: 85 NG/DL
T4 FREE SERPL-MCNC: 0.99 NG/DL
TSH SERPL DL<=0.005 MIU/L-ACNC: 4.84 UIU/ML

## 2017-11-27 PROCEDURE — 36415 COLL VENOUS BLD VENIPUNCTURE: CPT | Mod: PO

## 2017-11-27 PROCEDURE — 84480 ASSAY TRIIODOTHYRONINE (T3): CPT

## 2017-11-27 PROCEDURE — 84443 ASSAY THYROID STIM HORMONE: CPT

## 2017-11-27 PROCEDURE — 84439 ASSAY OF FREE THYROXINE: CPT

## 2017-11-27 RX ORDER — LEVOTHYROXINE SODIUM 175 UG/1
175 TABLET ORAL DAILY
Qty: 30 TABLET | Refills: 11 | Status: SHIPPED | OUTPATIENT
Start: 2017-11-27 | End: 2017-12-28 | Stop reason: SDUPTHER

## 2017-11-27 NOTE — TELEPHONE ENCOUNTER
TSH better but still elevated. Increase synthroid from 150 mcg to 175 mcg. Check TSH, FT4, FT3 prior to f/u in Rk

## 2017-11-27 NOTE — TELEPHONE ENCOUNTER
Let her know it could be a LN. If not improving in a few days may want to be evaluated by PCP or ENT

## 2017-12-05 ENCOUNTER — PATIENT MESSAGE (OUTPATIENT)
Dept: ADMINISTRATIVE | Facility: OTHER | Age: 27
End: 2017-12-05

## 2017-12-11 ENCOUNTER — ROUTINE PRENATAL (OUTPATIENT)
Dept: OBSTETRICS AND GYNECOLOGY | Facility: CLINIC | Age: 27
End: 2017-12-11
Payer: COMMERCIAL

## 2017-12-11 VITALS — WEIGHT: 125.25 LBS | BODY MASS INDEX: 22.9 KG/M2 | SYSTOLIC BLOOD PRESSURE: 106 MMHG | DIASTOLIC BLOOD PRESSURE: 64 MMHG

## 2017-12-11 DIAGNOSIS — Z34.03 ENCOUNTER FOR SUPERVISION OF NORMAL FIRST PREGNANCY IN THIRD TRIMESTER: Primary | ICD-10-CM

## 2017-12-11 DIAGNOSIS — Z3A.31 31 WEEKS GESTATION OF PREGNANCY: ICD-10-CM

## 2017-12-11 PROCEDURE — 99213 OFFICE O/P EST LOW 20 MIN: CPT | Mod: S$GLB,,, | Performed by: ADVANCED PRACTICE MIDWIFE

## 2017-12-11 PROCEDURE — 99999 PR PBB SHADOW E&M-EST. PATIENT-LVL II: CPT | Mod: PBBFAC,,, | Performed by: ADVANCED PRACTICE MIDWIFE

## 2017-12-28 ENCOUNTER — TELEPHONE (OUTPATIENT)
Dept: ENDOCRINOLOGY | Facility: CLINIC | Age: 27
End: 2017-12-28

## 2017-12-28 ENCOUNTER — LAB VISIT (OUTPATIENT)
Dept: LAB | Facility: HOSPITAL | Age: 27
End: 2017-12-28
Attending: INTERNAL MEDICINE
Payer: COMMERCIAL

## 2017-12-28 DIAGNOSIS — E03.9 HYPOTHYROIDISM, UNSPECIFIED TYPE: ICD-10-CM

## 2017-12-28 LAB
T3FREE SERPL-MCNC: 1.8 PG/ML
T4 FREE SERPL-MCNC: 1.17 NG/DL
TSH SERPL DL<=0.005 MIU/L-ACNC: 2.88 UIU/ML

## 2017-12-28 PROCEDURE — 36415 COLL VENOUS BLD VENIPUNCTURE: CPT | Mod: PO

## 2017-12-28 PROCEDURE — 84481 FREE ASSAY (FT-3): CPT

## 2017-12-28 PROCEDURE — 84443 ASSAY THYROID STIM HORMONE: CPT

## 2017-12-28 PROCEDURE — 84439 ASSAY OF FREE THYROXINE: CPT

## 2017-12-28 RX ORDER — LEVOTHYROXINE SODIUM 200 UG/1
200 TABLET ORAL DAILY
Qty: 30 TABLET | Refills: 11 | Status: SHIPPED | OUTPATIENT
Start: 2017-12-28 | End: 2018-03-19 | Stop reason: SDUPTHER

## 2017-12-28 NOTE — TELEPHONE ENCOUNTER
Let her know she needs more synthroid. Increase synthroid to 200 mcg once daily. WIll schedule f/u labs when she comes in next week

## 2017-12-29 ENCOUNTER — ROUTINE PRENATAL (OUTPATIENT)
Dept: OBSTETRICS AND GYNECOLOGY | Facility: CLINIC | Age: 27
End: 2017-12-29
Payer: COMMERCIAL

## 2017-12-29 VITALS — DIASTOLIC BLOOD PRESSURE: 62 MMHG | BODY MASS INDEX: 23.06 KG/M2 | SYSTOLIC BLOOD PRESSURE: 98 MMHG | WEIGHT: 126.13 LBS

## 2017-12-29 DIAGNOSIS — Z34.03 ENCOUNTER FOR PRENATAL CARE IN THIRD TRIMESTER OF FIRST PREGNANCY: Primary | ICD-10-CM

## 2017-12-29 DIAGNOSIS — Z3A.34 34 WEEKS GESTATION OF PREGNANCY: ICD-10-CM

## 2017-12-29 DIAGNOSIS — O99.019 ANEMIA DURING PREGNANCY: ICD-10-CM

## 2017-12-29 PROCEDURE — 99999 PR PBB SHADOW E&M-EST. PATIENT-LVL II: CPT | Mod: PBBFAC,,, | Performed by: ADVANCED PRACTICE MIDWIFE

## 2017-12-29 PROCEDURE — 0502F SUBSEQUENT PRENATAL CARE: CPT | Mod: S$GLB,,, | Performed by: ADVANCED PRACTICE MIDWIFE

## 2017-12-29 NOTE — PROGRESS NOTES
Did Ignacio classes. Is feeling prepared.  Birth plan reviewed.  CBC/HIV/RPR ordered, to be done next visit.  Firming up , possibly Yeimy from the [facility].  FMR protocol, 3T warning signs, and PTL S&S reviewed.  Vertex per Leopold's today.  GBS next visit.

## 2018-01-03 ENCOUNTER — OFFICE VISIT (OUTPATIENT)
Dept: ENDOCRINOLOGY | Facility: CLINIC | Age: 28
End: 2018-01-03
Payer: COMMERCIAL

## 2018-01-03 VITALS
HEIGHT: 62 IN | WEIGHT: 127.31 LBS | HEART RATE: 79 BPM | BODY MASS INDEX: 23.43 KG/M2 | DIASTOLIC BLOOD PRESSURE: 70 MMHG | SYSTOLIC BLOOD PRESSURE: 110 MMHG

## 2018-01-03 DIAGNOSIS — E03.9 HYPOTHYROID IN PREGNANCY, ANTEPARTUM, THIRD TRIMESTER: Primary | ICD-10-CM

## 2018-01-03 DIAGNOSIS — C73 THYROID CANCER: Primary | ICD-10-CM

## 2018-01-03 DIAGNOSIS — O99.283 HYPOTHYROID IN PREGNANCY, ANTEPARTUM, THIRD TRIMESTER: Primary | ICD-10-CM

## 2018-01-03 DIAGNOSIS — C73 THYROID CANCER: ICD-10-CM

## 2018-01-03 PROCEDURE — 99999 PR PBB SHADOW E&M-EST. PATIENT-LVL III: CPT | Mod: PBBFAC,,, | Performed by: INTERNAL MEDICINE

## 2018-01-03 PROCEDURE — 99214 OFFICE O/P EST MOD 30 MIN: CPT | Mod: S$GLB,,, | Performed by: INTERNAL MEDICINE

## 2018-01-03 NOTE — PROGRESS NOTES
(OB- Dr. Orellana)  CHIEF COMPLAINT: Papillary Thyroid Cancer   27 year old being seen as a f/u. S/P total thyroidectomy 6/13. She received 100 mCi on 9/13 (She received tracer scan prior to determine dose). On synthroid 200 mcg and cytomel 5 mcg BID. Currently 35 Weeks pregnant. Has midwife at [facility]. No N/V. No palpitations. No tremors          FINAL PATHOLOGIC DIAGNOSIS   1. 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   2. THYROID (LEFT LOBE, CLINICAL): NO TUMOR SEEN.   THYROID CANCER CASE SUMMARY   Thyroid gland resection   Procedure: right lobectomy   Specimen integrity: separate right and left lobes   Specimen size: 6.0 cm   Tumor focality: single focus   Tumor laterality: right   Tumor size: 2.0 cm   Histologic type: papillary carcinoma, tall cell variant   Margins: not involved; closest margin < 0.1cm   Tumor capsule: totally encapsulated   Tumor capsular invasion: minimally invasive   Lymph-vascular invasion: not identified   Extrathyroid extension: not identified   Pathologic stage: I (pT1b NX)     PAST MEDICAL HISTORY/PAST SURGICAL HISTORY: Reviewed in [employer]     SOCIAL HISTORY: No T/A. .     FAMILY HISTORY: No thyroid cancer. Distant relative had hyperthyroidism.     MEDICATIONS/ALLERGIES: The patient's MedCard has been updated and reviewed.       ROS:   Constitutional: No fatigue.    Eyes: No recent visual changes   ENT: No dysphagia   Cardiovascular: Denies current anginal symptoms   Respiratory: Denies current respiratory difficulty   Gastrointestinal: No N/V   GenitoUrinary - No dysuria   Skin: No new skin rash   Neurologic: No focal neurologic complaints   Remainder ROS negative       PE:   GENERAL: Well developed, well nourished.   NECK: Supple, trachea midline, surgical scar intact. No LAD  CHEST: Resp even and unlabored, CTA bilateral.   CARDIAC: RRR, S1, S2 heard, no murmurs, rubs, S3, or S4        Results for CARLITA WORRELL (MRN  [id number]) as of 1/3/2018 09:46   Ref. Range 12/28/2017 08:54   TSH Latest Ref Range: 0.400 - 4.000 uIU/mL 2.879   T3, Free Latest Ref Range: 2.3 - 4.2 pg/mL 1.8 (L)   Free T4 Latest Ref Range: 0.71 - 1.51 ng/dL 1.17           ASSESSMENT/PLAN:   1. 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 TG Ab +. With minimal invasion of capsule but within margins. S/P 100 mCi. Post Tx WBS was normal. Her Tg Ab are elevated. 1 year WBS shows no iodine avid disease. AB were increased, so PET scan done 10/14 which was negative. Dose recently increased. WIll be delivering soon so will hold off on repeat testing. Will have her do labs 4 weeks after delivery. She will message us after delivery to schedule. WIll need Tg and thyroid US at f/u    2. Hypothyroidism- See # 1. Monitor for hyperthyroid symptoms post pregnancy    3. 35 weeks pregnant    FOLLOWUP:  She will do TSH, Ft4, T3- 4 weeks after delivery (she will call)  F/U 4 months- TSH, Ft4, T3, Tg, thyroid US

## 2018-01-11 ENCOUNTER — PATIENT MESSAGE (OUTPATIENT)
Dept: ADMINISTRATIVE | Facility: OTHER | Age: 28
End: 2018-01-11

## 2018-01-12 ENCOUNTER — ROUTINE PRENATAL (OUTPATIENT)
Dept: OBSTETRICS AND GYNECOLOGY | Facility: CLINIC | Age: 28
End: 2018-01-12
Payer: COMMERCIAL

## 2018-01-12 VITALS
SYSTOLIC BLOOD PRESSURE: 100 MMHG | DIASTOLIC BLOOD PRESSURE: 70 MMHG | WEIGHT: 124.56 LBS | BODY MASS INDEX: 22.78 KG/M2

## 2018-01-12 DIAGNOSIS — Z3A.36 36 WEEKS GESTATION OF PREGNANCY: ICD-10-CM

## 2018-01-12 DIAGNOSIS — Z34.03 ENCOUNTER FOR SUPERVISION OF NORMAL FIRST PREGNANCY IN THIRD TRIMESTER: Primary | ICD-10-CM

## 2018-01-12 PROCEDURE — 87081 CULTURE SCREEN ONLY: CPT

## 2018-01-12 PROCEDURE — 99999 PR PBB SHADOW E&M-EST. PATIENT-LVL II: CPT | Mod: PBBFAC,,, | Performed by: ADVANCED PRACTICE MIDWIFE

## 2018-01-12 PROCEDURE — 99213 OFFICE O/P EST LOW 20 MIN: CPT | Mod: S$GLB,,, | Performed by: ADVANCED PRACTICE MIDWIFE

## 2018-01-14 LAB — BACTERIA SPEC AEROBE CULT: NORMAL

## 2018-01-19 ENCOUNTER — LAB VISIT (OUTPATIENT)
Dept: LAB | Facility: OTHER | Age: 28
End: 2018-01-19
Payer: COMMERCIAL

## 2018-01-19 ENCOUNTER — ROUTINE PRENATAL (OUTPATIENT)
Dept: OBSTETRICS AND GYNECOLOGY | Facility: CLINIC | Age: 28
End: 2018-01-19
Payer: COMMERCIAL

## 2018-01-19 VITALS — BODY MASS INDEX: 23.43 KG/M2 | SYSTOLIC BLOOD PRESSURE: 98 MMHG | WEIGHT: 128.06 LBS | DIASTOLIC BLOOD PRESSURE: 58 MMHG

## 2018-01-19 DIAGNOSIS — Z34.03 ENCOUNTER FOR PRENATAL CARE IN THIRD TRIMESTER OF FIRST PREGNANCY: Primary | ICD-10-CM

## 2018-01-19 DIAGNOSIS — Z34.03 ENCOUNTER FOR SUPERVISION OF NORMAL FIRST PREGNANCY IN THIRD TRIMESTER: ICD-10-CM

## 2018-01-19 DIAGNOSIS — Z3A.36 36 WEEKS GESTATION OF PREGNANCY: ICD-10-CM

## 2018-01-19 LAB
BASOPHILS # BLD AUTO: 0.01 K/UL
BASOPHILS NFR BLD: 0.1 %
DIFFERENTIAL METHOD: ABNORMAL
EOSINOPHIL # BLD AUTO: 0.1 K/UL
EOSINOPHIL NFR BLD: 0.6 %
ERYTHROCYTE [DISTWIDTH] IN BLOOD BY AUTOMATED COUNT: 12.7 %
HCT VFR BLD AUTO: 33.9 %
HGB BLD-MCNC: 11.3 G/DL
LYMPHOCYTES # BLD AUTO: 1.6 K/UL
LYMPHOCYTES NFR BLD: 17.9 %
MCH RBC QN AUTO: 33.2 PG
MCHC RBC AUTO-ENTMCNC: 33.3 G/DL
MCV RBC AUTO: 100 FL
MONOCYTES # BLD AUTO: 0.7 K/UL
MONOCYTES NFR BLD: 7.8 %
NEUTROPHILS # BLD AUTO: 6.4 K/UL
NEUTROPHILS NFR BLD: 73.4 %
PLATELET # BLD AUTO: 161 K/UL
PMV BLD AUTO: 10.7 FL
RBC # BLD AUTO: 3.4 M/UL
WBC # BLD AUTO: 8.75 K/UL

## 2018-01-19 PROCEDURE — 0502F SUBSEQUENT PRENATAL CARE: CPT | Mod: S$GLB,,, | Performed by: ADVANCED PRACTICE MIDWIFE

## 2018-01-19 PROCEDURE — 85025 COMPLETE CBC W/AUTO DIFF WBC: CPT

## 2018-01-19 PROCEDURE — 36415 COLL VENOUS BLD VENIPUNCTURE: CPT

## 2018-01-19 PROCEDURE — 86703 HIV-1/HIV-2 1 RESULT ANTBDY: CPT

## 2018-01-19 PROCEDURE — 99999 PR PBB SHADOW E&M-EST. PATIENT-LVL III: CPT | Mod: PBBFAC,,, | Performed by: ADVANCED PRACTICE MIDWIFE

## 2018-01-19 NOTE — PROGRESS NOTES
Here for routine OB appt, with no complaints.    Reports good FM; daily FMC reinforced. Denies LOF, denies VB, rare contractions.  Confirmed she declined Flu & TDAP vaccine.  Blood work drawn today.  Reviewed GBS neg  Consents reviewed and discussed - signed today.  Reviewed warning signs and discussed labor precautions; RTC weekly

## 2018-01-22 LAB — HIV 1+2 AB+HIV1 P24 AG SERPL QL IA: NEGATIVE

## 2018-01-25 ENCOUNTER — ROUTINE PRENATAL (OUTPATIENT)
Dept: OBSTETRICS AND GYNECOLOGY | Facility: CLINIC | Age: 28
End: 2018-01-25
Payer: COMMERCIAL

## 2018-01-25 VITALS
BODY MASS INDEX: 23.35 KG/M2 | DIASTOLIC BLOOD PRESSURE: 60 MMHG | WEIGHT: 127.63 LBS | SYSTOLIC BLOOD PRESSURE: 100 MMHG

## 2018-01-25 DIAGNOSIS — Z3A.38 38 WEEKS GESTATION OF PREGNANCY: ICD-10-CM

## 2018-01-25 DIAGNOSIS — Z34.03 ENCOUNTER FOR SUPERVISION OF NORMAL FIRST PREGNANCY IN THIRD TRIMESTER: Primary | ICD-10-CM

## 2018-01-25 PROCEDURE — 99999 PR PBB SHADOW E&M-EST. PATIENT-LVL II: CPT | Mod: PBBFAC,,, | Performed by: ADVANCED PRACTICE MIDWIFE

## 2018-01-25 PROCEDURE — 99213 OFFICE O/P EST LOW 20 MIN: CPT | Mod: S$GLB,,, | Performed by: ADVANCED PRACTICE MIDWIFE

## 2018-02-02 ENCOUNTER — ROUTINE PRENATAL (OUTPATIENT)
Dept: OBSTETRICS AND GYNECOLOGY | Facility: CLINIC | Age: 28
End: 2018-02-02
Payer: COMMERCIAL

## 2018-02-02 ENCOUNTER — HOSPITAL ENCOUNTER (EMERGENCY)
Facility: OTHER | Age: 28
Discharge: HOME | End: 2018-02-02
Attending: OBSTETRICS & GYNECOLOGY
Payer: COMMERCIAL

## 2018-02-02 ENCOUNTER — PATIENT MESSAGE (OUTPATIENT)
Dept: ADMINISTRATIVE | Facility: OTHER | Age: 28
End: 2018-02-02

## 2018-02-02 VITALS
RESPIRATION RATE: 18 BRPM | SYSTOLIC BLOOD PRESSURE: 96 MMHG | OXYGEN SATURATION: 98 % | HEART RATE: 60 BPM | TEMPERATURE: 98 F | DIASTOLIC BLOOD PRESSURE: 61 MMHG

## 2018-02-02 VITALS
DIASTOLIC BLOOD PRESSURE: 62 MMHG | WEIGHT: 127.44 LBS | BODY MASS INDEX: 23.31 KG/M2 | SYSTOLIC BLOOD PRESSURE: 102 MMHG

## 2018-02-02 DIAGNOSIS — O36.8190 DECREASED FETAL MOVEMENT AFFECTING MANAGEMENT OF PREGNANCY, ANTEPARTUM, SINGLE OR UNSPECIFIED FETUS: Primary | ICD-10-CM

## 2018-02-02 DIAGNOSIS — Z3A.39 39 WEEKS GESTATION OF PREGNANCY: ICD-10-CM

## 2018-02-02 DIAGNOSIS — O36.8131 DECREASED FETAL MOVEMENTS IN THIRD TRIMESTER, FETUS 1 OF MULTIPLE GESTATION: ICD-10-CM

## 2018-02-02 DIAGNOSIS — Z34.03 ENCOUNTER FOR PRENATAL CARE IN THIRD TRIMESTER OF FIRST PREGNANCY: Primary | ICD-10-CM

## 2018-02-02 PROCEDURE — 99999 PR PBB SHADOW E&M-EST. PATIENT-LVL III: CPT | Mod: PBBFAC,,, | Performed by: ADVANCED PRACTICE MIDWIFE

## 2018-02-02 PROCEDURE — 0502F SUBSEQUENT PRENATAL CARE: CPT | Mod: S$GLB,,, | Performed by: ADVANCED PRACTICE MIDWIFE

## 2018-02-02 PROCEDURE — 99283 EMERGENCY DEPT VISIT LOW MDM: CPT | Mod: 25

## 2018-02-02 PROCEDURE — 59025 FETAL NON-STRESS TEST: CPT

## 2018-02-02 PROCEDURE — 59025 FETAL NON-STRESS TEST: CPT | Mod: 26,,, | Performed by: OBSTETRICS & GYNECOLOGY

## 2018-02-02 PROCEDURE — 99282 EMERGENCY DEPT VISIT SF MDM: CPT | Mod: 25,,, | Performed by: OBSTETRICS & GYNECOLOGY

## 2018-02-02 NOTE — Clinical Note
Pt seen in midwife clinic today  Reports +fm but decreased from recently.  Denies ctx, vb or lof  Call clinic [phone] or L & D after hours at [phone] for vaginal bleeding, leakage of fluids, regular contractions every 5 mins for 2 hours, decreased feta l movements ( 10 kicks in 2 hours), headache not relieved by Tylenol, blurry vision, or temp of 100.4 or greater.  Begin doing fetal kick counts, at least 10 movements in 2 hours starting at 28 weeks gestation.  Keep next clinic appointment

## 2018-02-02 NOTE — ED PROVIDER NOTES
"Encounter Date: 2/2/2018       History     Chief Complaint   Patient presents with    Decreased Fetal Movement     28 yo G1 @ 39 3/7 wga sent from the [facility] office for decreased fetal movement x 1 day at 39 3/7 wga. Prenatal care has been routine, except for controlled post surgical hypothyroidism.    Patient reports some fetal movements - but not 10 per hour. She reports some painless "Stonewall candelaria contractions". She denies leaking fluid/vaginal bleeding.           Review of patient's allergies indicates:   Allergen Reactions    E-mycin [erythromycin] Nausea And Vomiting    Sulfa (sulfonamide antibiotics) Hives    Ceclor [cefaclor] Rash     Past Medical History:   Diagnosis Date    Anemia     during pregnancy    Anxiousness     Cancer     papillary thyroid    Headache(784.0)     Infertility, female     thyroid related after surgery    Mental disorder     depression, eating disorder at 17  or 18, therapy    Otitis media     PONV (postoperative nausea and vomiting)     Post-surgical hypothyroidism 9/11/2013    Sinusitis      Past Surgical History:   Procedure Laterality Date    MOUTH SURGERY      extraction of [place] teeth    THYROID SURGERY      TOTAL THYROIDECTOMY      6/10/13     Family History   Problem Relation Age of Onset    Alzheimer's disease Paternal Grandmother     Breast cancer Neg Hx     Colon cancer Neg Hx     Ovarian cancer Neg Hx      Social History   Substance Use Topics    Smoking status: Never Smoker    Smokeless tobacco: Never Used    Alcohol use No     Review of Systems   Constitutional: Negative.    HENT: Negative.    Respiratory: Negative.    Cardiovascular: Negative.    Gastrointestinal: Negative.    Genitourinary: Negative.        Physical Exam     Initial Vitals   BP Pulse Resp Temp SpO2   02/02/18 1654 02/02/18 1653 02/02/18 1648 02/02/18 1654 02/02/18 1653   96/61 83 18 97.6 °F (36.4 °C) 99 %      MAP       02/02/18 1654       72.67         Physical Exam    Vitals " reviewed.  Constitutional: She appears well-developed and well-nourished.   HENT:   Head: Normocephalic and atraumatic.   Nose: Nose normal.   Cardiovascular: Normal rate and intact distal pulses.   Pulmonary/Chest: No respiratory distress.   Abdominal: Soft. There is no tenderness.   Gravid; cephalic by Leopolds, EFW 7 lbs   Musculoskeletal: She exhibits no edema or tenderness.   Neurological: She is alert and oriented to person, place, and time.   Skin: Skin is warm and dry. Capillary refill takes less than 2 seconds.   Psychiatric: She has a normal mood and affect. Her behavior is normal. Judgment and thought content normal.         ED Course   Obtain Fetal nonstress test (NST)  Date/Time: 2/2/2018 5:29 PM  Performed by: SIMEON LOZOYA  Authorized by: SIMEON LOZOYA     Nonstress Test:     Variability:  6-25 BPM    Decelerations:  None    Accelerations:  15 bpm    Acoustic Stimulator: No      Baseline:  135    Contractions:  Irregular    Contraction Frequency:  2-5  Biophysical Profile:     Fluid Volume:  2    Nonstress Test:  2    ROBERT (if indicated) (cm):  7    MVP (Maximal Vertical Pocket):  2    Nonstress Test Interpretation: reactive      Overall Impression:  Reassuring  Post-procedure:      Amniotic fluid is technically adequate; but only 1 pocket was > 2 cm; would repeatin 5-7      Labs Reviewed - No data to display          Medical Decision Making:   History:   Old Medical Records: I decided to obtain old medical records.  Old Records Summarized: records from clinic visits.       <> Summary of Records: Prenatal record reviewed  ED Management:  Patient evaluated for decreased fetal movement at term. NST reactive; amniotic fluid adequate, although subjectively low - but full bladder noted also.     Kick counts reviewed with patient & spouse.   F/u on Tuesday in clinic for visit and repeat ROBERT.  Other:   I have discussed this case with another health care provider.       <> Summary of the  Discussion: CNRAYMOND Evangelista notified of results and recommendations.                   ED Course      Clinical Impression:   The primary encounter diagnosis was Decreased fetal movement affecting management of pregnancy, antepartum, single or unspecified fetus. A diagnosis of 39 weeks gestation of pregnancy was also pertinent to this visit.                           Katie Gray MD  02/02/18 1934

## 2018-02-02 NOTE — DISCHARGE INSTRUCTIONS
Call clinic [phone] or L & D after hours at [phone] for vaginal bleeding, leakage of fluids, regular contractions every 5 mins for 2 hours, decreased fetal movements ( 10 kicks in 2 hours), headache not relieved by Tylenol, blurry vision, or temp of 100.4 or greater.  Begin doing fetal kick counts, at least 10 movements in 2 hours starting at 28 weeks gestation.  Keep next clinic appointment

## 2018-02-03 NOTE — PROGRESS NOTES
Doing well, although reports decreased fetal movements today.  She has been drinking water throughout the day and eating normally, has been home.  Reports FM x1 while in office, but feels baby is typically much more active throughout the day.  Otherwise doing well. Occasional mild UCs, no LOF or VB. Requests VE: 1/60/-3, cephalic  Reviewed labor precautions and daily FMC. Pt to OB ED for further eval re: decreased FM.

## 2018-02-06 ENCOUNTER — ROUTINE PRENATAL (OUTPATIENT)
Dept: OBSTETRICS AND GYNECOLOGY | Facility: CLINIC | Age: 28
End: 2018-02-06
Payer: COMMERCIAL

## 2018-02-06 VITALS — SYSTOLIC BLOOD PRESSURE: 98 MMHG | WEIGHT: 125.88 LBS | DIASTOLIC BLOOD PRESSURE: 60 MMHG | BODY MASS INDEX: 23.02 KG/M2

## 2018-02-06 DIAGNOSIS — Z34.93 PREGNANCY WITH ONE FETUS IN THIRD TRIMESTER: Primary | ICD-10-CM

## 2018-02-06 DIAGNOSIS — O48.0 POST TERM PREGNANCY OVER 40 WEEKS: ICD-10-CM

## 2018-02-06 PROCEDURE — 0502F SUBSEQUENT PRENATAL CARE: CPT | Mod: S$GLB,,, | Performed by: ADVANCED PRACTICE MIDWIFE

## 2018-02-06 PROCEDURE — 99999 PR PBB SHADOW E&M-EST. PATIENT-LVL II: CPT | Mod: PBBFAC,,, | Performed by: ADVANCED PRACTICE MIDWIFE

## 2018-02-06 NOTE — PROGRESS NOTES
Here for routine OB appt, with no complaints.  Occasional UCs, no LOF or VB. Discussed post dates options with IOL at 41w vs expectant management, reviewed risks and benefits to both. Pt desires continued expectant management with increased fetal surveillance if still pregnant at 41wks.  Will schedule PNT twice that week per protocol.  Pt reports great FM.  Reviewed ROBERT wnl in OB ED - no indication for repeat screening currently. (consult with Dr. Wright and in agreement re: POC)  Reports good FM; daily FMC reinforced. Denies LOF, denies VB, denies contractions.  Reviewed warning signs of PTL and Preeclampsia.  RTC 4wks

## 2018-02-12 ENCOUNTER — TELEPHONE (OUTPATIENT)
Dept: OBSTETRICS AND GYNECOLOGY | Facility: CLINIC | Age: 28
End: 2018-02-12

## 2018-02-12 ENCOUNTER — HOSPITAL ENCOUNTER (INPATIENT)
Facility: OTHER | Age: 28
LOS: 2 days | Discharge: HOME | End: 2018-02-15
Attending: OBSTETRICS & GYNECOLOGY | Admitting: OBSTETRICS & GYNECOLOGY
Payer: COMMERCIAL

## 2018-02-12 ENCOUNTER — ROUTINE PRENATAL (OUTPATIENT)
Dept: OBSTETRICS AND GYNECOLOGY | Facility: CLINIC | Age: 28
End: 2018-02-12
Payer: COMMERCIAL

## 2018-02-12 ENCOUNTER — HOSPITAL ENCOUNTER (OUTPATIENT)
Dept: PERINATAL CARE | Facility: OTHER | Age: 28
Discharge: HOME | End: 2018-02-12
Attending: ADVANCED PRACTICE MIDWIFE
Payer: COMMERCIAL

## 2018-02-12 VITALS
WEIGHT: 126.19 LBS | DIASTOLIC BLOOD PRESSURE: 62 MMHG | BODY MASS INDEX: 23.08 KG/M2 | SYSTOLIC BLOOD PRESSURE: 102 MMHG

## 2018-02-12 DIAGNOSIS — O48.0 POST TERM PREGNANCY OVER 40 WEEKS: ICD-10-CM

## 2018-02-12 DIAGNOSIS — Z34.93 PRENATAL CARE IN THIRD TRIMESTER: Primary | ICD-10-CM

## 2018-02-12 DIAGNOSIS — Z37.9 NORMAL LABOR: Primary | ICD-10-CM

## 2018-02-12 DIAGNOSIS — O48.0 41 WEEKS GESTATION OF PREGNANCY: ICD-10-CM

## 2018-02-12 DIAGNOSIS — Z3A.41 41 WEEKS GESTATION OF PREGNANCY: ICD-10-CM

## 2018-02-12 PROCEDURE — 99999 PR PBB SHADOW E&M-EST. PATIENT-LVL III: CPT | Mod: PBBFAC,,, | Performed by: ADVANCED PRACTICE MIDWIFE

## 2018-02-12 PROCEDURE — 59025 FETAL NON-STRESS TEST: CPT

## 2018-02-12 PROCEDURE — 59025 FETAL NON-STRESS TEST: CPT | Mod: 26,,, | Performed by: OBSTETRICS & GYNECOLOGY

## 2018-02-12 PROCEDURE — 99285 EMERGENCY DEPT VISIT HI MDM: CPT

## 2018-02-12 PROCEDURE — 0502F SUBSEQUENT PRENATAL CARE: CPT | Mod: S$GLB,,, | Performed by: ADVANCED PRACTICE MIDWIFE

## 2018-02-12 PROCEDURE — 76815 OB US LIMITED FETUS(S): CPT | Mod: 26,,, | Performed by: OBSTETRICS & GYNECOLOGY

## 2018-02-12 PROCEDURE — 76815 OB US LIMITED FETUS(S): CPT

## 2018-02-12 NOTE — PROGRESS NOTES
27 y.o. female  at 40w6d  Doing well    TW lbs   Discussed postdates management and IOL - she prefers IOL at 41+6  Prenatal testing has been scheduled  Delivery consents done previously  FH 38 cm, by Leopold's baby low in pelvis and EFW 7-7.5 lbs,   Will Scheduling IOL at 41+6, will return in 5 days for membrane sweep and to schedule IOL if still pregnant  Reviewed warning signs, normal FKCs, labor precautions and how/when to call.  RTC x 5 days, call or present sooner prn.

## 2018-02-13 ENCOUNTER — ANESTHESIA EVENT (OUTPATIENT)
Dept: OBSTETRICS AND GYNECOLOGY | Facility: OTHER | Age: 28
End: 2018-02-13
Payer: COMMERCIAL

## 2018-02-13 ENCOUNTER — ANESTHESIA (OUTPATIENT)
Dept: OBSTETRICS AND GYNECOLOGY | Facility: OTHER | Age: 28
End: 2018-02-13
Payer: COMMERCIAL

## 2018-02-13 PROBLEM — Z37.9 NORMAL LABOR: Status: RESOLVED | Noted: 2018-02-13 | Resolved: 2018-02-13

## 2018-02-13 PROBLEM — Z37.9 NORMAL LABOR: Status: ACTIVE | Noted: 2018-02-13

## 2018-02-13 PROBLEM — Z34.03 ENCOUNTER FOR PRENATAL CARE IN THIRD TRIMESTER OF FIRST PREGNANCY: Status: RESOLVED | Noted: 2017-11-24 | Resolved: 2018-02-13

## 2018-02-13 LAB
ABO + RH BLD: NORMAL
BASOPHILS # BLD AUTO: 0.01 K/UL
BASOPHILS # BLD AUTO: ABNORMAL K/UL
BASOPHILS NFR BLD: 0 %
BASOPHILS NFR BLD: 0.1 %
BLD GP AB SCN CELLS X3 SERPL QL: NORMAL
DIFFERENTIAL METHOD: ABNORMAL
DIFFERENTIAL METHOD: ABNORMAL
EOSINOPHIL # BLD AUTO: 0 K/UL
EOSINOPHIL # BLD AUTO: ABNORMAL K/UL
EOSINOPHIL NFR BLD: 0 %
EOSINOPHIL NFR BLD: 0 %
ERYTHROCYTE [DISTWIDTH] IN BLOOD BY AUTOMATED COUNT: 12.4 %
ERYTHROCYTE [DISTWIDTH] IN BLOOD BY AUTOMATED COUNT: 12.5 %
HCT VFR BLD AUTO: 29.9 %
HCT VFR BLD AUTO: 37.8 %
HGB BLD-MCNC: 10.2 G/DL
HGB BLD-MCNC: 12.9 G/DL
LYMPHOCYTES # BLD AUTO: 1.2 K/UL
LYMPHOCYTES # BLD AUTO: ABNORMAL K/UL
LYMPHOCYTES NFR BLD: 6.2 %
LYMPHOCYTES NFR BLD: 7 %
MCH RBC QN AUTO: 33.5 PG
MCH RBC QN AUTO: 33.8 PG
MCHC RBC AUTO-ENTMCNC: 34.1 G/DL
MCHC RBC AUTO-ENTMCNC: 34.1 G/DL
MCV RBC AUTO: 98 FL
MCV RBC AUTO: 99 FL
MONOCYTES # BLD AUTO: 0.7 K/UL
MONOCYTES # BLD AUTO: ABNORMAL K/UL
MONOCYTES NFR BLD: 3.4 %
MONOCYTES NFR BLD: 5 %
NEUTROPHILS # BLD AUTO: 17.4 K/UL
NEUTROPHILS NFR BLD: 79 %
NEUTROPHILS NFR BLD: 90 %
NEUTS BAND NFR BLD MANUAL: 9 %
PLATELET # BLD AUTO: 156 K/UL
PLATELET # BLD AUTO: 197 K/UL
PLATELET BLD QL SMEAR: ABNORMAL
PMV BLD AUTO: 10.3 FL
PMV BLD AUTO: 10.8 FL
RBC # BLD AUTO: 3.02 M/UL
RBC # BLD AUTO: 3.85 M/UL
WBC # BLD AUTO: 19.29 K/UL
WBC # BLD AUTO: 23.12 K/UL

## 2018-02-13 PROCEDURE — 99283 EMERGENCY DEPT VISIT LOW MDM: CPT | Mod: 25,,, | Performed by: OBSTETRICS & GYNECOLOGY

## 2018-02-13 PROCEDURE — 59400 OBSTETRICAL CARE: CPT | Mod: QY,,, | Performed by: ANESTHESIOLOGY

## 2018-02-13 PROCEDURE — 25000003 PHARM REV CODE 250: Performed by: ANESTHESIOLOGY

## 2018-02-13 PROCEDURE — 25000003 PHARM REV CODE 250: Performed by: ADVANCED PRACTICE MIDWIFE

## 2018-02-13 PROCEDURE — 63600175 PHARM REV CODE 636 W HCPCS: Performed by: ANESTHESIOLOGY

## 2018-02-13 PROCEDURE — 59025 FETAL NON-STRESS TEST: CPT | Mod: 26,,, | Performed by: OBSTETRICS & GYNECOLOGY

## 2018-02-13 PROCEDURE — 96372 THER/PROPH/DIAG INJ SC/IM: CPT

## 2018-02-13 PROCEDURE — 11000001 HC ACUTE MED/SURG PRIVATE ROOM

## 2018-02-13 PROCEDURE — 86592 SYPHILIS TEST NON-TREP QUAL: CPT

## 2018-02-13 PROCEDURE — 85027 COMPLETE CBC AUTOMATED: CPT

## 2018-02-13 PROCEDURE — 86922 COMPATIBILITY TEST ANTIGLOB: CPT

## 2018-02-13 PROCEDURE — 2Y44X5Z PACKING OF FEMALE GENITAL TRACT USING PACKING MATERIAL: ICD-10-PCS | Performed by: OBSTETRICS & GYNECOLOGY

## 2018-02-13 PROCEDURE — 0HQ9XZZ REPAIR PERINEUM SKIN, EXTERNAL APPROACH: ICD-10-PCS | Performed by: OBSTETRICS & GYNECOLOGY

## 2018-02-13 PROCEDURE — 59400 OBSTETRICAL CARE: CPT | Mod: GB,,, | Performed by: ADVANCED PRACTICE MIDWIFE

## 2018-02-13 PROCEDURE — 63600175 PHARM REV CODE 636 W HCPCS: Performed by: ADVANCED PRACTICE MIDWIFE

## 2018-02-13 PROCEDURE — 85025 COMPLETE CBC W/AUTO DIFF WBC: CPT

## 2018-02-13 PROCEDURE — 86850 RBC ANTIBODY SCREEN: CPT

## 2018-02-13 PROCEDURE — 72100002 HC LABOR CARE, 1ST 8 HOURS

## 2018-02-13 PROCEDURE — 27800517 HC TRAY,EPIDURAL-CONTINUOUS: Performed by: ANESTHESIOLOGY

## 2018-02-13 PROCEDURE — 85007 BL SMEAR W/DIFF WBC COUNT: CPT

## 2018-02-13 PROCEDURE — 27200710 HC EPIDURAL INFUSION PUMP SET: Performed by: ANESTHESIOLOGY

## 2018-02-13 PROCEDURE — 62326 NJX INTERLAMINAR LMBR/SAC: CPT | Performed by: ANESTHESIOLOGY

## 2018-02-13 PROCEDURE — 51702 INSERT TEMP BLADDER CATH: CPT

## 2018-02-13 PROCEDURE — 72200005 HC VAGINAL DELIVERY LEVEL II

## 2018-02-13 RX ORDER — BUPIVACAINE HYDROCHLORIDE 2.5 MG/ML
INJECTION, SOLUTION EPIDURAL; INFILTRATION; INTRACAUDAL
Status: DISCONTINUED | OUTPATIENT
Start: 2018-02-13 | End: 2018-02-13

## 2018-02-13 RX ORDER — CARBOPROST TROMETHAMINE 250 UG/ML
250 INJECTION, SOLUTION INTRAMUSCULAR
Status: DISCONTINUED | OUTPATIENT
Start: 2018-02-13 | End: 2018-02-13

## 2018-02-13 RX ORDER — DIPHENHYDRAMINE HYDROCHLORIDE 50 MG/ML
25 INJECTION INTRAMUSCULAR; INTRAVENOUS EVERY 4 HOURS PRN
Status: DISCONTINUED | OUTPATIENT
Start: 2018-02-13 | End: 2018-02-15 | Stop reason: HOSPADM

## 2018-02-13 RX ORDER — OXYTOCIN/RINGER'S LACTATE 20/1000 ML
20 PLASTIC BAG, INJECTION (ML) INTRAVENOUS
Status: DISCONTINUED | OUTPATIENT
Start: 2018-02-13 | End: 2018-02-13

## 2018-02-13 RX ORDER — OXYCODONE AND ACETAMINOPHEN 5; 325 MG/1; MG/1
1 TABLET ORAL EVERY 4 HOURS PRN
Status: DISCONTINUED | OUTPATIENT
Start: 2018-02-13 | End: 2018-02-15 | Stop reason: HOSPADM

## 2018-02-13 RX ORDER — TERBUTALINE SULFATE 1 MG/ML
INJECTION SUBCUTANEOUS
Status: DISPENSED
Start: 2018-02-13 | End: 2018-02-13

## 2018-02-13 RX ORDER — LIDOCAINE HYDROCHLORIDE 10 MG/ML
5 INJECTION INFILTRATION; PERINEURAL ONCE AS NEEDED
Status: DISCONTINUED | OUTPATIENT
Start: 2018-02-13 | End: 2018-02-13

## 2018-02-13 RX ORDER — FENTANYL/BUPIVACAINE/NS/PF 2MCG/ML-.1
PLASTIC BAG, INJECTION (ML) INJECTION CONTINUOUS
Status: DISCONTINUED | OUTPATIENT
Start: 2018-02-13 | End: 2018-02-13

## 2018-02-13 RX ORDER — OXYCODONE AND ACETAMINOPHEN 10; 325 MG/1; MG/1
1 TABLET ORAL EVERY 4 HOURS PRN
Status: DISCONTINUED | OUTPATIENT
Start: 2018-02-13 | End: 2018-02-15 | Stop reason: HOSPADM

## 2018-02-13 RX ORDER — OXYTOCIN 10 [USP'U]/ML
10 INJECTION, SOLUTION INTRAMUSCULAR; INTRAVENOUS ONCE AS NEEDED
Status: DISCONTINUED | OUTPATIENT
Start: 2018-02-13 | End: 2018-02-13

## 2018-02-13 RX ORDER — ACETAMINOPHEN 325 MG/1
650 TABLET ORAL EVERY 6 HOURS PRN
Status: DISCONTINUED | OUTPATIENT
Start: 2018-02-13 | End: 2018-02-15 | Stop reason: HOSPADM

## 2018-02-13 RX ORDER — LIDOCAINE HYDROCHLORIDE 20 MG/ML
JELLY TOPICAL
Status: DISCONTINUED | OUTPATIENT
Start: 2018-02-13 | End: 2018-02-13

## 2018-02-13 RX ORDER — DOCUSATE SODIUM 100 MG/1
200 CAPSULE, LIQUID FILLED ORAL 2 TIMES DAILY PRN
Status: DISCONTINUED | OUTPATIENT
Start: 2018-02-13 | End: 2018-02-15 | Stop reason: HOSPADM

## 2018-02-13 RX ORDER — OXYTOCIN/RINGER'S LACTATE 20/1000 ML
41.65 PLASTIC BAG, INJECTION (ML) INTRAVENOUS CONTINUOUS
Status: ACTIVE | OUTPATIENT
Start: 2018-02-13 | End: 2018-02-14

## 2018-02-13 RX ORDER — ONDANSETRON 8 MG/1
8 TABLET, ORALLY DISINTEGRATING ORAL EVERY 8 HOURS PRN
Status: DISCONTINUED | OUTPATIENT
Start: 2018-02-13 | End: 2018-02-15 | Stop reason: HOSPADM

## 2018-02-13 RX ORDER — FENTANYL/BUPIVACAINE/NS/PF 2MCG/ML-.1
PLASTIC BAG, INJECTION (ML) INJECTION
Status: DISPENSED
Start: 2018-02-13 | End: 2018-02-13

## 2018-02-13 RX ORDER — HYDROCORTISONE 25 MG/G
CREAM TOPICAL 3 TIMES DAILY PRN
Status: DISCONTINUED | OUTPATIENT
Start: 2018-02-13 | End: 2018-02-15 | Stop reason: HOSPADM

## 2018-02-13 RX ORDER — LIOTHYRONINE SODIUM 5 UG/1
5 TABLET ORAL 2 TIMES DAILY
Status: DISCONTINUED | OUTPATIENT
Start: 2018-02-13 | End: 2018-02-15 | Stop reason: HOSPADM

## 2018-02-13 RX ORDER — HYDROCODONE BITARTRATE AND ACETAMINOPHEN 500; 5 MG/1; MG/1
TABLET ORAL
Status: DISCONTINUED | OUTPATIENT
Start: 2018-02-13 | End: 2018-02-13

## 2018-02-13 RX ORDER — FENTANYL CITRATE 50 UG/ML
INJECTION, SOLUTION INTRAMUSCULAR; INTRAVENOUS
Status: DISPENSED
Start: 2018-02-13 | End: 2018-02-14

## 2018-02-13 RX ORDER — FENTANYL/BUPIVACAINE/NS/PF 2MCG/ML-.1
PLASTIC BAG, INJECTION (ML) INJECTION CONTINUOUS PRN
Status: DISCONTINUED | OUTPATIENT
Start: 2018-02-13 | End: 2018-02-13

## 2018-02-13 RX ORDER — OXYTOCIN/RINGER'S LACTATE 20/1000 ML
2 PLASTIC BAG, INJECTION (ML) INTRAVENOUS CONTINUOUS
Status: DISCONTINUED | OUTPATIENT
Start: 2018-02-13 | End: 2018-02-13

## 2018-02-13 RX ORDER — LIDOCAINE HYDROCHLORIDE AND EPINEPHRINE 15; 5 MG/ML; UG/ML
INJECTION, SOLUTION EPIDURAL
Status: DISCONTINUED | OUTPATIENT
Start: 2018-02-13 | End: 2018-02-13

## 2018-02-13 RX ORDER — ONDANSETRON 8 MG/1
8 TABLET, ORALLY DISINTEGRATING ORAL EVERY 8 HOURS PRN
Status: DISCONTINUED | OUTPATIENT
Start: 2018-02-13 | End: 2018-02-13

## 2018-02-13 RX ORDER — PRENATAL WITH FERROUS FUM AND FOLIC ACID 3080; 920; 120; 400; 22; 1.84; 3; 20; 10; 1; 12; 200; 27; 25; 2 [IU]/1; [IU]/1; MG/1; [IU]/1; MG/1; MG/1; MG/1; MG/1; MG/1; MG/1; UG/1; MG/1; MG/1; MG/1; MG/1
1 TABLET ORAL DAILY
Status: DISCONTINUED | OUTPATIENT
Start: 2018-02-14 | End: 2018-02-15 | Stop reason: HOSPADM

## 2018-02-13 RX ORDER — FAMOTIDINE 10 MG/ML
20 INJECTION INTRAVENOUS ONCE
Status: DISCONTINUED | OUTPATIENT
Start: 2018-02-13 | End: 2018-02-13

## 2018-02-13 RX ORDER — BUPIVACAINE HYDROCHLORIDE 2.5 MG/ML
INJECTION, SOLUTION EPIDURAL; INFILTRATION; INTRACAUDAL
Status: COMPLETED
Start: 2018-02-13 | End: 2018-02-13

## 2018-02-13 RX ORDER — MISOPROSTOL 200 UG/1
800 TABLET ORAL
Status: DISCONTINUED | OUTPATIENT
Start: 2018-02-13 | End: 2018-02-13

## 2018-02-13 RX ORDER — NAPROXEN 500 MG/1
500 TABLET ORAL EVERY 8 HOURS PRN
Status: DISCONTINUED | OUTPATIENT
Start: 2018-02-13 | End: 2018-02-15 | Stop reason: HOSPADM

## 2018-02-13 RX ORDER — BUPIVACAINE HYDROCHLORIDE 2.5 MG/ML
INJECTION, SOLUTION EPIDURAL; INFILTRATION; INTRACAUDAL
Status: DISPENSED
Start: 2018-02-13 | End: 2018-02-14

## 2018-02-13 RX ORDER — METHYLERGONOVINE MALEATE 0.2 MG/ML
200 INJECTION INTRAVENOUS
Status: DISCONTINUED | OUTPATIENT
Start: 2018-02-13 | End: 2018-02-13

## 2018-02-13 RX ORDER — SODIUM CITRATE AND CITRIC ACID MONOHYDRATE 334; 500 MG/5ML; MG/5ML
30 SOLUTION ORAL ONCE
Status: DISCONTINUED | OUTPATIENT
Start: 2018-02-13 | End: 2018-02-13

## 2018-02-13 RX ORDER — DIPHENHYDRAMINE HCL 25 MG
25 CAPSULE ORAL EVERY 4 HOURS PRN
Status: DISCONTINUED | OUTPATIENT
Start: 2018-02-13 | End: 2018-02-15 | Stop reason: HOSPADM

## 2018-02-13 RX ORDER — FENTANYL CITRATE 50 UG/ML
INJECTION, SOLUTION INTRAMUSCULAR; INTRAVENOUS
Status: DISCONTINUED | OUTPATIENT
Start: 2018-02-13 | End: 2018-02-13

## 2018-02-13 RX ORDER — DIPHENOXYLATE HYDROCHLORIDE AND ATROPINE SULFATE 2.5; .025 MG/1; MG/1
2 TABLET ORAL ONCE
Status: COMPLETED | OUTPATIENT
Start: 2018-02-13 | End: 2018-02-13

## 2018-02-13 RX ADMIN — METHYLERGONOVINE MALEATE 200 MCG: 0.2 INJECTION, SOLUTION INTRAMUSCULAR; INTRAVENOUS at 04:02

## 2018-02-13 RX ADMIN — BUPIVACAINE HYDROCHLORIDE 3 ML: 2.5 INJECTION, SOLUTION EPIDURAL; INFILTRATION; INTRACAUDAL; PERINEURAL at 09:02

## 2018-02-13 RX ADMIN — FENTANYL CITRATE 100 MCG: 50 INJECTION, SOLUTION INTRAMUSCULAR; INTRAVENOUS at 01:02

## 2018-02-13 RX ADMIN — CARBOPROST TROMETHAMINE 250 MCG: 250 INJECTION, SOLUTION INTRAMUSCULAR at 04:02

## 2018-02-13 RX ADMIN — Medication 5 ML: at 08:02

## 2018-02-13 RX ADMIN — MISOPROSTOL 800 MCG: 200 TABLET ORAL at 04:02

## 2018-02-13 RX ADMIN — DIPHENOXYLATE HYDROCHLORIDE AND ATROPINE SULFATE 2 TABLET: 2.5; .025 TABLET ORAL at 07:02

## 2018-02-13 RX ADMIN — Medication 10 ML/HR: at 08:02

## 2018-02-13 RX ADMIN — LIOTHYRONINE SODIUM 5 MCG: 5 TABLET ORAL at 09:02

## 2018-02-13 RX ADMIN — LIDOCAINE HYDROCHLORIDE,EPINEPHRINE BITARTRATE 3 ML: 15; .005 INJECTION, SOLUTION EPIDURAL; INFILTRATION; INTRACAUDAL; PERINEURAL at 08:02

## 2018-02-13 RX ADMIN — Medication 4 ML: at 01:02

## 2018-02-13 RX ADMIN — Medication 2 MILLI-UNITS/MIN: at 12:02

## 2018-02-13 NOTE — SUBJECTIVE & OBJECTIVE
Interval History:  Idalia is a 27 y.o.  at 41w0d.   Transferred to L&D for category 2 tracing    Objective:     Vital Signs (Most Recent):  Temp: 97.5 °F (36.4 °C) (18 0538)  Pulse: 66 (18 0645)  Resp: 18 (18 0645)  BP: (!) 111/57 (18 0645)  SpO2: 100 % (18 0644) Vital Signs (24h Range):  Temp:  [96.3 °F (35.7 °C)-98.1 °F (36.7 °C)] 97.5 °F (36.4 °C)  Pulse:  [] 66  Resp:  [18] 18  SpO2:  [100 %] 100 %  BP: (102-126)/(55-76) 111/57     Weight: 57.2 kg (126 lb)  Body mass index is 23.05 kg/m².    FHT: 140Cat 2 (no anterior lip and with urge to push)  TOCO:  Q 3-4 minutes    Cervical Exam:  Dilation:  10  Effacement:  100%  Station: 1  Presentation: Vertex     Significant Labs:  Lab Results   Component Value Date    GROUPTRH O POS 2018    HEPBSAG Negative 2017    STREPBCULT No Group B Streptococcus isolated 2018       CBC:   Recent Labs  Lab 18  0534   WBC 19.29*   RBC 3.85*   HGB 12.9   HCT 37.8      MCV 98   MCH 33.5*   MCHC 34.1     I have personallly reviewed all pertinent lab results from the last 24 hours.

## 2018-02-13 NOTE — ASSESSMENT & PLAN NOTE
At 0505 audible late deceleration by doppler  cx 8/80%/-1 at that time and pt transferred to L&D for EFM at 0520  FHT baseline 140, moderate variability.  Occasional late decels, occasional variable decels.  Spontaneous urge to push around 0600.  Trickle of bright red vaginal bleeding noted at 0630 and on vaginal exam endematous anterior lip of cervix noted.  Attempted to reduce anterior lip without success.  Patient encouraged to breath with contractions.  A: active labor with anterior lip  Category 2 tracing

## 2018-02-13 NOTE — ASSESSMENT & PLAN NOTE
History of Present Illness:   Idalia Lee is a 27 y.o. female  at 41w0d with Estimated Date of Delivery: 18who presents to [facility] accompanied by  and .     Last ate chicken, green beans and sweet potatoes at 1900, last drank water ongoing.     Presentation: vtx  Estimated Fetal Weight: 7.5 lb    Birth Center Risk Assessment: 1- Meets birth center guidelines

## 2018-02-13 NOTE — DISCHARGE INSTRUCTIONS
Breastfeeding Discharge Instructions       Feed the baby at the earliest sign of hunger or comfort  o Hands to mouth, sucking motions  o Rooting or searching for something to suck on  o Dont wait for crying - it is a sign of distress     The feedings may be 8-12 times per 24hrs and will not follow a schedule   Avoid pacifiers and bottles for the first 4 weeks   Alternate the breast you start the feeding with, or start with the breast that feels the fullest   Switch breasts when the baby takes himself off the breast or falls asleep   Keep offering breasts until the baby looks full, no longer gives hunger signs, and stays asleep when placed on his back in the crib   If the baby is sleepy and wont wake for a feeding, put the baby skin-to-skin dressed in a diaper against the mothers bare chest   Sleep near your baby   The baby should be positioned and latched on to the breast correctly  o Chest-to-chest, chin in the breast  o Babys lips are flipped outward  o Babys mouth is stretched open wide like a shout  o Babys sucking should feel like tugging to the mother  - The baby should be drinking at the breast:  o You should hear swallowing or gulping throughout the feeding  o You should see milk on the babys lips when he comes off the breast  o Your breasts should be softer when the baby is finished feeding  o The baby should look relaxed at the end of feedings  o After the 4th day and your milk is in:  o The babys poop should turn bright yellow and be loose, watery, and seedy  o The baby should have at least 3-4 poops the size of the palm of your hand per day  o The baby should have at least 5-6 wet diapers per day  o The urine should be light yellow in color  You should drink when you are thirsty and eat a healthy diet when you are    hungry.     Take naps to get the rest you need.   Take medications and/or drink alcohol only with permission of your obstetrician    or the babys pediatrician.  You can  also call the [facility],   ([phone]), Monday-Friday, 8am-5pm Central time, to get the most   up-to-date evidence-based information on the use of medications during   pregnancy and breastfeeding.      The baby should be examined by a pediatrician at 3-5 days of age.  Once your   milk comes in, the baby should be gaining at least ½ - 1oz each day and should be back to birthweight no later than 10-14 days of age.          Community Resources    [facility] Breastfeeding Warmline: [phone]   Local [facility] clinics: provide incentives and breastpumps to eligible mothers  [employer] (LLLI):  mother-to-mother support group website        www.Foodzai.MiName  Local La Leche League mother-to-mother support groups:        www.IndusDiva.com        La Leche League [facility]   Dr. Hernandez Cruz website for latch videos and general information:        www.breastfeedinginc.ca  [facility] is a call center that provides information about the safety of taking medications while breastfeeding.  Call [phone], M-F, 8am-5pm, CT.  International Lactation Consultant Association provides resources for assistance:        www.ilca.org  [facility] provides informationand resources for parents  and the community    http://[facility] Departmentastfeeding.org     Fany Caceres is a mom-to-mom support group:                             www.[employer].com//breastfeedng-support/  [employer] for Healthy Babies:  7-845-113-BABY(8620)  [employer]: a breastfeeding support group for women of color, [phone]

## 2018-02-13 NOTE — ANESTHESIA PREPROCEDURE EVALUATION
2018  Idalia Sona Lee is a 27 y.o., female.      OB History    Para Term  AB Living   1             SAB TAB Ectopic Multiple Live Births                  # Outcome Date GA Lbr Nj/2nd Weight Sex Delivery Anes PTL Lv   1 Current                   Wt Readings from Last 1 Encounters:   18 0002 57.2 kg (126 lb)       BP Readings from Last 3 Encounters:   18 119/66   18 102/62   18 98/60       Patient Active Problem List   Diagnosis    Thyroid cancer    S/P thyroidectomy    Post-surgical hypothyroidism    S/P radioactive iodine thyroid ablation    Echogenic intracardiac focus of fetus on prenatal ultrasound    Mild anemia during pregnancy    Normal labor       Past Surgical History:   Procedure Laterality Date    MOUTH SURGERY      extraction of [place] teeth    THYROID SURGERY      TOTAL THYROIDECTOMY      6/10/13       Social History     Social History    Marital status:      Spouse name: N/A    Number of children: N/A    Years of education: N/A     Occupational History    Not on file.     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     I have reviewed the Medications.     Review of Systems  Anesthesia Hx:  History of prior surgery of interest to airway management or planning:  Denies Personal Hx of Anesthesia complications.   Cardiovascular:  Cardiovascular Normal     Pulmonary:  Pulmonary Normal    Hepatic/GI:  Hepatic/GI Normal    Endocrine:   Hypothyroidism        Physical Exam  General:  Well nourished    Airway/Jaw/Neck:  Airway Findings: Mouth Opening: Normal Tongue: Normal  General Airway Assessment: Adult  Mallampati: I  TM Distance: Normal, at least 6 cm      Dental:  Dental Findings: In tact        Mental Status:  Mental Status Findings:  Cooperative, Alert and Oriented         Anesthesia Plan  Type of Anesthesia, risks & benefits discussed:  Anesthesia Type:  epidural, general, spinal  Patient's Preference:   Intra-op Monitoring Plan: standard ASA monitors  Intra-op Monitoring Plan Comments:   Post Op Pain Control Plan:   Post Op Pain Control Plan Comments:   Induction:   IV  Beta Blocker:         Informed Consent: Patient understands risks and agrees with Anesthesia plan.  Questions answered. Anesthesia consent signed with patient.  ASA Score: 2     Day of Surgery Review of History & Physical:    H&P update referred to the surgeon.         Ready For Surgery From Anesthesia Perspective.

## 2018-02-13 NOTE — ED NOTES
Pt roomed in [facility] 3 with spouse and KYLE Armstrong CNM at side.  Pt c/o contractoins every 2-3 minutes for 2 hours, states +fm, denies lof or vb.  FHTs verified at 130s.  SVE per KYLE Armstrong CNM 3/70/-3.  Pt given option to walk and return in two hours for cervical recheck. VSS. Will continue to monitor.

## 2018-02-13 NOTE — ANESTHESIA PROCEDURE NOTES
Epidural    Patient location during procedure: OB   Reason for block: primary anesthetic   Diagnosis: IUP   Start time: 2/13/2018 8:20 AM  Timeout: 2/13/2018 8:20 AM  End time: 2/13/2018 8:23 AM  Staffing  Anesthesiologist: LEYDA FARMER  Resident/CRNA: IRAM GORDON  Performed: resident/CRNA   Preanesthetic Checklist  Completed: patient identified, site marked, surgical consent, pre-op evaluation, timeout performed, IV checked, risks and benefits discussed, monitors and equipment checked, anesthesia consent given, hand hygiene performed and patient being monitored  Preparation  Patient position: sitting  Prep: ChloraPrep  Patient monitoring: Pulse Ox and Blood Pressure  Epidural  Skin Anesthetic: lidocaine 1%  Skin Wheal: 3 mL  Administration type: single shot  Approach: midline  Interspace: L4-5  Injection technique: SILVIA air  Needle and Epidural Catheter  Needle type: Tuohy   Needle gauge: 17  Needle length: 3.5 inches  Needle insertion depth: 5 cm  Catheter type: springwound and multi-orifice  Catheter size: 19 G  Catheter at skin depth: 10 cm  Test dose: 3 mL of lidocaine 1.5% with Epi 1-to-200,000  Additional Documentation: incremental injection, negative aspiration for heme and CSF, no paresthesia on injection, no signs/symptoms of IV or SA injection, no significant complaints from patient and no significant pain on injection  Needle localization: anatomical landmarks  Medications:  Bolus administered: 10 mL of 0.125% bupivacaine  Epinephrine added: none  Volume per aspiration: 5 mL  Time between aspirations: 3 minutes  Assessment  Ease of block: easy  Patient's tolerance of the procedure: comfortable throughout block  Additional Notes  Performed dural puncture with 25g Renny needle after SILVIA with air.

## 2018-02-13 NOTE — PROGRESS NOTES
"LABOR NOTE  Assuming care of pt secondary to shift change    S:  Pt breathing heavily, moaning and bearing down with UCs. Assisted back to bed from toilet, where she was briefly sitting, attempting to void.  Gray (pt's ) and Yeimy () at bedside and supportive.     O: /66   Pulse 62   Temp 98.4 °F (36.9 °C)   Resp 18   Ht 5' 2" (1.575 m)   Wt 57.2 kg (126 lb)   LMP  (LMP Unknown)   SpO2 100%   Breastfeeding? No   BMI 23.05 kg/m²     GENERAL: Calm and appropriate affect  NEURO: Alert, oriented, normal speech  ABDOMEN: Nontender, Fundus palpates soft between UC's.  FHT: Baseline 120, moderate BTBV, positive accels, late decels. Cat 2.  CTX: q 2-5 minutes  SVE: 9/80/-1, swollen cervix/anterior lip, OP      ASSESSMENT:   27 y.o.  IUP at 41w0d    Patient Active Problem List   Diagnosis    Thyroid cancer    S/P thyroidectomy    Post-surgical hypothyroidism    S/P radioactive iodine thyroid ablation    Echogenic intracardiac focus of fetus on prenatal ultrasound    Mild anemia during pregnancy    Normal labor         PLAN:  Uncontrollable urge to push and bearing down with each UC. Recommend epidural at this time - risks and benefits reviewed. Pt very agreeable to this and anesthesia notified.  Will attempt to labor down following epidural  Continue close Maternal/Fetal monitoring  Strip reviewed with Dr. Pillai, OB/GYN physician on call - to bedside to meet patient. In agreement with POC.      Verona Evangelista CNM  "

## 2018-02-13 NOTE — PROGRESS NOTES
[facility]  Obstetrics  Labor Progress Note    Patient Name: Idalia Lee  MRN: [id number]  Admission Date: 2018  Hospital Length of Stay: 0 days  Attending Physician: Elda Frederick MD  Primary Care Provider: Primary Doctor No    Subjective:     Principal Problem:Normal labor    Interval History:  Idalia is a 27 y.o.  at 41w0d. She is doing well. C/o back pain and pressure with ctx    Objective:     Vital Signs (Most Recent):  Temp: 96.6 °F (35.9 °C) (18 0230)  Pulse: 69 (18 0325)  Resp: 18 (18 032)  BP: (!) 119/56 (18 0325) Vital Signs (24h Range):  Temp:  [96.6 °F (35.9 °C)-98.1 °F (36.7 °C)] 96.6 °F (35.9 °C)  Pulse:  [63-83] 69  Resp:  [18] 18  BP: (102-119)/(55-69) 119/56     Weight: 57.2 kg (126 lb)  Body mass index is 23.05 kg/m².    FHT: 140 Cat 1 (reassuring) per intermittent auscultation  TOCO:  Q 3-4 minutes per palpation    Physical Exam:   Constitutional: She is oriented to person, place, and time.       Cardiovascular: Normal rate.     Pulmonary/Chest: Effort normal.        Abdominal: Soft.             Musculoskeletal: Normal range of motion.       Neurological: She is alert and oriented to person, place, and time.    Skin: Skin is warm and dry.    Psychiatric: She has a normal mood and affect.       Cervical Exam:  Dilation:    Effacement:    Station:   Presentation:      Significant Labs:  Lab Results   Component Value Date    GROUPTRH O POS 10/23/2017    HEPBSAG Negative 2017    STREPBCULT No Group B Streptococcus isolated 2018       I have personallly reviewed all pertinent lab results from the last 24 hours.    Assessment/Plan:     27 y.o. female  at 41w0d for:    Active Diagnoses:    Diagnosis Date Noted POA    PRINCIPAL PROBLEM:  Normal labor [O80, Z37.9] 2018 Not Applicable      Problems Resolved During this Admission:    Diagnosis Date Noted Date Resolved POA       Encourage position changes  and assist with comfort measures  Sanjuanita at [facility], CN  Obstetrics  [facility] [place]

## 2018-02-13 NOTE — PROGRESS NOTES
[facility]  Obstetrics  Labor Progress Note    Patient Name: Idalia Lee  MRN: [id number]  Admission Date: 2018  Hospital Length of Stay: 0 days  Attending Physician: Elda Frederick MD  Primary Care Provider: Primary Doctor No    Subjective:     Principal Problem:Normal labor    Hospital Course:  Admitted to [facility] on 18 at 0100 for active labor with SROM at 0026, 5/80%/-2  Transferred to L&D at 0520 for audible late decels, 8/80%/-1    Interval History:  Idalia is a 27 y.o.  at 41w0d.   Transferred to L&D for category 2 tracing    Objective:     Vital Signs (Most Recent):  Temp: 97.5 °F (36.4 °C) (18 0538)  Pulse: 66 (18 0645)  Resp: 18 (18 0645)  BP: (!) 111/57 (18 0645)  SpO2: 100 % (18 0644) Vital Signs (24h Range):  Temp:  [96.3 °F (35.7 °C)-98.1 °F (36.7 °C)] 97.5 °F (36.4 °C)  Pulse:  [] 66  Resp:  [18] 18  SpO2:  [100 %] 100 %  BP: (102-126)/(55-76) 111/57     Weight: 57.2 kg (126 lb)  Body mass index is 23.05 kg/m².    FHT: 140Cat 2 (no anterior lip and with urge to push)  TOCO:  Q 3-4 minutes    Cervical Exam:  Dilation:  10  Effacement:  100%  Station: 1  Presentation: Vertex     Significant Labs:  Lab Results   Component Value Date    GROUPTRH O POS 2018    HEPBSAG Negative 2017    STREPBCULT No Group B Streptococcus isolated 2018       CBC:   Recent Labs  Lab 18  0534   WBC 19.29*   RBC 3.85*   HGB 12.9   HCT 37.8      MCV 98   MCH 33.5*   MCHC 34.1     I have personallly reviewed all pertinent lab results from the last 24 hours.  endematous anterior cervical lip  Assessment/Plan:     27 y.o. female  at 41w0d for:    * Normal labor    At 0505 audible late deceleration by doppler  cx 8/80%/-1 at that time and pt transferred to L&D for EFM at 0520  FHT baseline 140, moderate variability.  Occasional late decels, occasional variable decels.  Spontaneous urge to push around 0600.   Trickle of bright red vaginal bleeding noted at 0630 and on vaginal exam endematous anterior lip of cervix noted.  Attempted to reduce anterior lip without success.  Patient encouraged to breath with contractions.  A: active labor with anterior lip  Category 2 tracing                  Indira Armstrong, LEEANNE  Obstetrics  [facility]

## 2018-02-13 NOTE — SUBJECTIVE & OBJECTIVE
Obstetric HPI:  Patient reports Date/time of onset: 18 at 2100 contractions, active fetal movement, Yes vaginal bleeding (bloody show) , Yes loss of fluid (SROM, clear fluid at 0026)    This pregnancy has been complicated by history of post-surgical hypothyroidism, managed by Dr. Rosa     Obstetric History       T0      L0     SAB0   TAB0   Ectopic0   Multiple0   Live Births0       # Outcome Date GA Lbr Nj/2nd Weight Sex Delivery Anes PTL Lv   1 Current                 Past Medical History:   Diagnosis Date    Anemia     during pregnancy    Anxiousness     Cancer     papillary thyroid    Headache(784.0)     Infertility, female     thyroid related after surgery    Mental disorder     depression, eating disorder at 17  or 18, therapy    Otitis media     PONV (postoperative nausea and vomiting)     Post-surgical hypothyroidism 2013    Sinusitis      Past Surgical History:   Procedure Laterality Date    MOUTH SURGERY      extraction of [place] teeth    THYROID SURGERY      TOTAL THYROIDECTOMY      6/10/13       PTA Medications   Medication Sig    Lactobacillus rhamnosus GG (CULTURELLE) 10 billion cell capsule Take 1 capsule by mouth once daily.    levothyroxine (SYNTHROID) 200 MCG tablet Take 1 tablet (200 mcg total) by mouth once daily. Brandname    liothyronine (CYTOMEL) 5 MCG Tab Take 1 tablet (5 mcg total) by mouth 2 (two) times daily.    PRENATAL VIT/IRON FUM/FOLIC AC (PRENATAL 1+1 ORAL) Take by mouth.       Review of patient's allergies indicates:   Allergen Reactions    E-mycin [erythromycin] Nausea And Vomiting    Sulfa (sulfonamide antibiotics) Hives    Ceclor [cefaclor] Rash        Family History     Problem Relation (Age of Onset)    Alzheimer's disease Paternal Grandmother        Social History Main Topics    Smoking status: Never Smoker    Smokeless tobacco: Never Used    Alcohol use No    Drug use: No    Sexual activity: Yes     Partners: Male     Birth  control/ protection: None     Review of Systems   Constitutional: Negative for chills, diaphoresis and fever.   Eyes: Negative for visual disturbance.   Respiratory: Negative for shortness of breath.    Cardiovascular: Negative for leg swelling.   Gastrointestinal: Positive for abdominal pain. Negative for diarrhea, nausea and vomiting.        Uterine contractions   Genitourinary: Negative for dysuria, genital sores, vaginal bleeding and vaginal discharge.   Neurological: Negative for seizures, syncope and headaches.      Objective:     Vital Signs (Most Recent):  Temp: 98.1 °F (36.7 °C) (02/12/18 2339)  Pulse: 63 (02/12/18 2339)  BP: 110/67 (02/12/18 2339) Vital Signs (24h Range):  Temp:  [98.1 °F (36.7 °C)] 98.1 °F (36.7 °C)  Pulse:  [63] 63  BP: (102-110)/(62-67) 110/67     Weight: 57.2 kg (126 lb)  Body mass index is 23.05 kg/m².    FHT: 140 Cat 1 (reassuring) per intermittent monitoring  TOCO:  Q 2-3 minutes per palpation    Physical Exam:   Constitutional: She is oriented to person, place, and time. She appears well-developed and well-nourished. No distress.   Moaning and breathing with contractions    HENT:   Head: Normocephalic and atraumatic.   Nose: Nose normal.    Eyes: Pupils are equal, round, and reactive to light.    Neck: Normal range of motion.    Cardiovascular: Normal rate, regular rhythm and normal heart sounds.     Pulmonary/Chest: Effort normal and breath sounds normal. No respiratory distress. She has no wheezes. She has no rales.        Abdominal: Soft. There is no tenderness.     Genitourinary: Uterus normal. Vaginal discharge found.   Genitourinary Comments: Bloody show, clear amniotic fluid           Musculoskeletal: Normal range of motion and moves all extremeties. She exhibits no edema.       Neurological: She is alert and oriented to person, place, and time.    Skin: Skin is warm and dry. No rash noted. She is not diaphoretic.    Psychiatric: She has a normal mood and affect. Her  behavior is normal. Thought content normal.       Cervix:  Dilation:  5  Effacement:  80%  Station: -2  Presentation: Vertex     Significant Labs:  Lab Results   Component Value Date    GROUPTRH O POS 10/23/2017    HEPBSAG Negative 06/20/2017    STREPBCULT No Group B Streptococcus isolated 01/12/2018       I have personallly reviewed all pertinent lab results from the last 24 hours.

## 2018-02-13 NOTE — HOSPITAL COURSE
2018: Admitted to [facility] at 0100 for active labor with SROM at 0026.  Transferred to L&D at 0520 for audible late decels, 8/80%/-1.   at 1639 of male infant. Postpartum hemorrhage secondary to uterine atony resolved with IV Pitocin, Cytotec, Methergine, & Hemabate. Bilateral sulcul & labial lacerations, along with vaginal laceration repaired. QBL:   Received 1 unit PRBC  D/c home on 2/15/18, stable

## 2018-02-13 NOTE — PROGRESS NOTES
0505- Midwife and RN at bedside.  FHT baseline noted to be 140 with audible late  0515- SVE performed, 8/80/-1, FHT baseline 130 with audible lates  0518- transfer to Labor and Delivery decided by KYLE Armstrong CNM. CN notified.   0520- Transferred to L&D via wheelchair

## 2018-02-13 NOTE — PROGRESS NOTES
"LABOR NOTE    S:  CNM to bedside with late decel. Pt resting with epidural, some right sided & lower back discomfort.  Family at bedside and supportive.     O: /66   Pulse 62   Temp 98.4 °F (36.9 °C)   Resp 18   Ht 5' 2" (1.575 m)   Wt 57.2 kg (126 lb)   LMP  (LMP Unknown)   SpO2 100%   Breastfeeding? No   BMI 23.05 kg/m²     GENERAL: Calm and appropriate affect  NEURO: Alert, oriented, normal speech  ABDOMEN: Nontender, Fundus palpates soft between UC's.  FHT: Baseline 130, moderate BTBV, positive accels, late decels. Cat 2.  CTX: q 1-5 minutes  SVE: Rim (to anterior and right side)/100/0, OP      ASSESSMENT:   27 y.o.  IUP at 41w0d    Patient Active Problem List   Diagnosis    Thyroid cancer    S/P thyroidectomy    Post-surgical hypothyroidism    S/P radioactive iodine thyroid ablation    Echogenic intracardiac focus of fetus on prenatal ultrasound    Mild anemia during pregnancy    Normal labor         PLAN:  Prolonged late decel with VE and pt being repositioned to right lat - pt repositioned to left lat, O2 per facemask and FHT recovered.  Peanut ball placed and pt reports relief of discomfort with this.  Cervical swelling has resolved, continue laboring down.  Continue close Maternal/Fetal monitoring  Recheck 2 hours or PRN      Verona Evangelista CNM    "

## 2018-02-13 NOTE — H&P
[facility]  Obstetrics  History & Physical    Patient Name: Idalia Lee  MRN: [id number]  Admission Date: 2018  Primary Care Provider: Primary Doctor No    Subjective:     Principal Problem:Normal labor    History of Present Illness:     26 y/o  at 41weeks with c/o contractions starting at 1700.  Contractions became stronger around 2100 and are now every 2-3 minutes.  Denies bleeding or leaking amniotic fluid and reports normal fetal movement today. Did have prenatal testing today which was normal.  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  GBS was negative.  O+, rubella immune    Obstetric HPI:  Patient reports Date/time of onset: 18 at 2100 contractions, active fetal movement, Yes vaginal bleeding (bloody show) , Yes loss of fluid (SROM, clear fluid at 0026)    This pregnancy has been complicated by history of post-surgical hypothyroidism, managed by Dr. Rosa     Obstetric History       T0      L0     SAB0   TAB0   Ectopic0   Multiple0   Live Births0       # Outcome Date GA Lbr Nj/2nd Weight Sex Delivery Anes PTL Lv   1 Current                 Past Medical History:   Diagnosis Date    Anemia     during pregnancy    Anxiousness     Cancer     papillary thyroid    Headache(784.0)     Infertility, female     thyroid related after surgery    Mental disorder     depression, eating disorder at 17  or 18, therapy    Otitis media     PONV (postoperative nausea and vomiting)     Post-surgical hypothyroidism 2013    Sinusitis      Past Surgical History:   Procedure Laterality Date    MOUTH SURGERY      extraction of [place] teeth    THYROID SURGERY      TOTAL THYROIDECTOMY      6/10/13       PTA Medications   Medication Sig    Lactobacillus rhamnosus GG (CULTURELLE) 10 billion cell capsule Take 1 capsule by mouth once daily.    levothyroxine (SYNTHROID) 200 MCG tablet  Take 1 tablet (200 mcg total) by mouth once daily. Brandname    liothyronine (CYTOMEL) 5 MCG Tab Take 1 tablet (5 mcg total) by mouth 2 (two) times daily.    PRENATAL VIT/IRON FUM/FOLIC AC (PRENATAL 1+1 ORAL) Take by mouth.       Review of patient's allergies indicates:   Allergen Reactions    E-mycin [erythromycin] Nausea And Vomiting    Sulfa (sulfonamide antibiotics) Hives    Ceclor [cefaclor] Rash        Family History     Problem Relation (Age of Onset)    Alzheimer's disease Paternal Grandmother        Social History Main Topics    Smoking status: Never Smoker    Smokeless tobacco: Never Used    Alcohol use No    Drug use: No    Sexual activity: Yes     Partners: Male     Birth control/ protection: None     Review of Systems   Constitutional: Negative for chills, diaphoresis and fever.   Eyes: Negative for visual disturbance.   Respiratory: Negative for shortness of breath.    Cardiovascular: Negative for leg swelling.   Gastrointestinal: Positive for abdominal pain. Negative for diarrhea, nausea and vomiting.        Uterine contractions   Genitourinary: Negative for dysuria, genital sores, vaginal bleeding and vaginal discharge.   Neurological: Negative for seizures, syncope and headaches.      Objective:     Vital Signs (Most Recent):  Temp: 98.1 °F (36.7 °C) (02/12/18 2339)  Pulse: 63 (02/12/18 2339)  BP: 110/67 (02/12/18 2339) Vital Signs (24h Range):  Temp:  [98.1 °F (36.7 °C)] 98.1 °F (36.7 °C)  Pulse:  [63] 63  BP: (102-110)/(62-67) 110/67     Weight: 57.2 kg (126 lb)  Body mass index is 23.05 kg/m².    FHT: 140 Cat 1 (reassuring) per intermittent monitoring  TOCO:  Q 2-3 minutes per palpation    Physical Exam:   Constitutional: She is oriented to person, place, and time. She appears well-developed and well-nourished. No distress.   Moaning and breathing with contractions    HENT:   Head: Normocephalic and atraumatic.   Nose: Nose normal.    Eyes: Pupils are equal, round, and reactive to  light.    Neck: Normal range of motion.    Cardiovascular: Normal rate, regular rhythm and normal heart sounds.     Pulmonary/Chest: Effort normal and breath sounds normal. No respiratory distress. She has no wheezes. She has no rales.        Abdominal: Soft. There is no tenderness.     Genitourinary: Uterus normal. Vaginal discharge found.   Genitourinary Comments: Bloody show, clear amniotic fluid           Musculoskeletal: Normal range of motion and moves all extremeties. She exhibits no edema.       Neurological: She is alert and oriented to person, place, and time.    Skin: Skin is warm and dry. No rash noted. She is not diaphoretic.    Psychiatric: She has a normal mood and affect. Her behavior is normal. Thought content normal.       Cervix:  Dilation:  5  Effacement:  80%  Station: -2  Presentation: Vertex     Significant Labs:  Lab Results   Component Value Date    GROUPTRH O POS 10/23/2017    HEPBSAG Negative 2017    STREPBCULT No Group B Streptococcus isolated 2018       I have personallly reviewed all pertinent lab results from the last 24 hours.    Assessment/Plan:     27 y.o. female  at 41w0d for:    * Normal labor    History of Present Illness:   Idalia Lee is a 27 y.o. female  at 41w0d with Estimated Date of Delivery: 18who presents to [facility] accompanied by  and .     Last ate chicken, green beans and sweet potatoes at 1900, last drank water ongoing.     Presentation: vtx  Estimated Fetal Weight: 7.5 lb    Birth Center Risk Assessment: 1- Meets birth center guidelines                Indira Armstrong CNM  Obstetrics  [facility]

## 2018-02-13 NOTE — TELEPHONE ENCOUNTER
Returned pt phone call and spoke with pt's .  Her reports onset of ctx at 2100 this evening, ctx strong and coming every 2 minutes.  No bleeding or ROM.  Option of laboring at home or coming to hospital for labor check and pt would like to be seen now.

## 2018-02-13 NOTE — HPI
26 y/o  at 41weeks with c/o contractions starting at 1700.  Contractions became stronger around 2100 and are now every 2-3 minutes.  Denies bleeding or leaking amniotic fluid and reports normal fetal movement today. Did have prenatal testing today which was normal.  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  GBS was negative.  O+, rubella immune

## 2018-02-13 NOTE — PROGRESS NOTES
"LABOR NOTE    S:  Pt sitting in high fowlers, epidural just re-dosed by Anesthesia and pt reports feeling relief of pain to lower back/lower abdomen with this. No additional complaints.  Family at bedside and supportive.     O: BP (!) 103/55   Pulse 68   Temp 97.7 °F (36.5 °C)   Resp 18   Ht 5' 2" (1.575 m)   Wt 57.2 kg (126 lb)   LMP  (LMP Unknown)   SpO2 99%   Breastfeeding? No   BMI 23.05 kg/m²     GENERAL: Calm and appropriate affect  NEURO: Alert, oriented, normal speech  ABDOMEN: Nontender, Fundus palpates soft between UC's.  FHT: Baseline 135, moderate BTBV, positive accels, occasional late decels. Cat 2.  CTX: q 2-4 minutes  SVE: 10/100/+1      ASSESSMENT:   27 y.o.  IUP at 41w0d, FHT reassuring/ Cat 1  Second Stage Labor    Patient Active Problem List   Diagnosis    Thyroid cancer    S/P thyroidectomy    Post-surgical hypothyroidism    S/P radioactive iodine thyroid ablation    Echogenic intracardiac focus of fetus on prenatal ultrasound    Mild anemia during pregnancy    Protracted labor          PLAN:  Late decels with VE (at 1350) - Will reposition pt to high fowlers and continue to labor down  Continue close Maternal/Fetal monitoring  Pitocin Augmentation per protocol - currently infusing at 4mu  Recheck 1-2 hours or PRN        Verona Evangelista CNM    "

## 2018-02-13 NOTE — ED PROVIDER NOTES
Encounter Date: 2018       History     Chief Complaint   Patient presents with    Contractions     28 y/o  at 41weeks with c/o contractions starting at 1700.  Contractions became stronger around 2100 and are now every 2-3 minutes.  Denies bleeding or leaking amniotic fluid and reports normal fetal movement today. Did have prenatal testing today which was normal.  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  GBS was negative.  O+, rubella immune.          Review of patient's allergies indicates:   Allergen Reactions    E-mycin [erythromycin] Nausea And Vomiting    Sulfa (sulfonamide antibiotics) Hives    Ceclor [cefaclor] Rash     Past Medical History:   Diagnosis Date    Anemia     during pregnancy    Anxiousness     Cancer     papillary thyroid    Headache(784.0)     Infertility, female     thyroid related after surgery    Mental disorder     depression, eating disorder at 17  or 18, therapy    Otitis media     PONV (postoperative nausea and vomiting)     Post-surgical hypothyroidism 2013    Sinusitis      Past Surgical History:   Procedure Laterality Date    MOUTH SURGERY      extraction of [place] teeth    THYROID SURGERY      TOTAL THYROIDECTOMY      6/10/13     Family History   Problem Relation Age of Onset    Alzheimer's disease Paternal Grandmother     Breast cancer Neg Hx     Colon cancer Neg Hx     Ovarian cancer Neg Hx      Social History   Substance Use Topics    Smoking status: Never Smoker    Smokeless tobacco: Never Used    Alcohol use No     Review of Systems   Constitutional: Negative for chills, diaphoresis and fever.   Respiratory: Negative for cough, chest tightness, shortness of breath and wheezing.    Cardiovascular: Negative for chest pain, palpitations and leg swelling.   Gastrointestinal: Positive for abdominal pain. Negative for diarrhea, nausea and vomiting.        Uterine contractions    Genitourinary: Negative for difficulty urinating, genital sores, vaginal bleeding, vaginal discharge and vaginal pain.   Skin: Negative for rash.   Neurological: Negative for seizures, syncope and headaches.       Physical Exam     Initial Vitals [02/12/18 2339]   BP Pulse Resp Temp SpO2   110/67 63 -- 98.1 °F (36.7 °C) --      MAP       81.33         Physical Exam    Nursing note and vitals reviewed.  Constitutional: She appears well-developed and well-nourished. She is not diaphoretic. No distress.   HENT:   Head: Normocephalic and atraumatic.   Eyes: Pupils are equal, round, and reactive to light.   Neck: Normal range of motion. Neck supple.   Cardiovascular: Normal rate, regular rhythm and normal heart sounds.   No murmur heard.  Pulmonary/Chest: Breath sounds normal. No respiratory distress. She has no wheezes. She has no rhonchi. She has no rales.   Abdominal: Soft. She exhibits no distension. There is no tenderness. There is no rebound and no guarding.   Genitourinary: Uterus normal. Vaginal discharge found.   Genitourinary Comments: Bloody show, clear amniotic fluid   Musculoskeletal: Normal range of motion. She exhibits no edema or tenderness.   Neurological: She is alert and oriented to person, place, and time. She has normal strength and normal reflexes.   Skin: Skin is warm and dry. No rash and no abscess noted. No erythema. No pallor.   Psychiatric: She has a normal mood and affect.     OB LABOR EXAM:   Pre-Term Labor: No.   Membranes ruptured: Yes.   Method: Sterile vaginal exam per MD.   Vaginal Bleeding: bloody show.   Engagement: engaged.   Dilatation: 5.   Station: -2.   Effacement: 80%.   Amniotic Fluid Color: normal.   Amniotic Fluid Amount: moderate.   Comments: cx 3/70%/-3 when presented to [facility].  SROM at 0026, clear fluid.  Progressed to 5 cm after one hour.  's, category 1 per intermittent monitoring  Ctx q 2-3 minutes per palpation       ED Course   Procedures  Labs Reviewed - No  data to display          Medical Decision Making:   History:   Old Records Summarized: records from clinic visits.       <> Summary of Records: Prenatal records reviewed  Initial Assessment:   41 weeks EGA with regular contractions and progressive cervical change  Normal labor  ED Management:  Admit to [facility] for labor management              Attending Attestation:   Physician Attestation Statement for Resident:  As the supervising MD   Physician Attestation Statement: I have personally seen and examined this patient.   I agree with the above history. -:   As the supervising MD I agree with the above PE.    As the supervising MD I agree with the above treatment, course, plan, and disposition.   -:   NST  I independently reviewed the fetal non-stress test with the following interpretation:  140 BPM baseline  Variability: moderate  Accelerations: present  Decelerations: absent  Contractions: Q 2-3 min  Category 1    Clinical Interpretation:reactive    Patient evaluated and found to be stable, agree with CN's assessment of active labor and plan to admit to L+D.  I was personally present during the critical portions of the procedure(s) performed by the resident and was immediately available in the ED to provide services and assistance as needed during the entire procedure.                    ED Course      Clinical Impression:   The encounter diagnosis was Normal labor.                           Indira Armstrong CNM  02/13/18 0111       Indira Armstrong CNM  02/13/18 0112       Jacinda Rubin MD  02/13/18 0157

## 2018-02-13 NOTE — PROGRESS NOTES
"LABOR NOTE    S:  CNM to bedside with late decel.  Pt resting comfortably on right lat with peanut ball and epidural, small window of discomfort to right lower abdomen.  Family at bedside and supportive.     O: /66   Pulse 62   Temp 98.4 °F (36.9 °C)   Resp 18   Ht 5' 2" (1.575 m)   Wt 57.2 kg (126 lb)   LMP  (LMP Unknown)   SpO2 100%   Breastfeeding? No   BMI 23.05 kg/m²     GENERAL: Calm and appropriate affect  NEURO: Alert, oriented, normal speech  ABDOMEN: Nontender, Fundus palpates soft between UC's.  FHT: Baseline 135, moderate BTBV, positive accels, late and prolonged late decels. Cat 2.  CTX: q 2-5 minutes  SVE: Ant lip/100/0, OP      ASSESSMENT:   27 y.o.  IUP at 41w0d  Protracted active phase of labor    Patient Active Problem List   Diagnosis    Thyroid cancer    S/P thyroidectomy    Post-surgical hypothyroidism    S/P radioactive iodine thyroid ablation    Echogenic intracardiac focus of fetus on prenatal ultrasound    Mild anemia during pregnancy         PLAN:  Pt repositioned to left lat and O2 placed per facemask, then pt repositioned to high fowlers with recovery of FHTs.  Anesthesia to bedside to assess pain.  Lengthy discussion regarding protracted labor and occasional late decels.  Reviewed indications for  section and also discussed if EFM continues to be reassuring in this position, potential option for Pitocin augmentation. Reviewed risks and benefits to both. Pt desires Pitocin at this time.  Start Pitocin Augmentation per protocol  Continue close Maternal/Fetal monitoring  Recheck 2 hours or PRN  Dr. Pillai given updated report on pt - in agreement re: POC      Verona Evangelista CNM    "

## 2018-02-14 LAB
ANISOCYTOSIS BLD QL SMEAR: SLIGHT
BASOPHILS # BLD AUTO: 0.01 K/UL
BASOPHILS # BLD AUTO: ABNORMAL K/UL
BASOPHILS NFR BLD: 0 %
BASOPHILS NFR BLD: 0.1 %
DIFFERENTIAL METHOD: ABNORMAL
DIFFERENTIAL METHOD: ABNORMAL
EOSINOPHIL # BLD AUTO: 0 K/UL
EOSINOPHIL # BLD AUTO: ABNORMAL K/UL
EOSINOPHIL NFR BLD: 0 %
EOSINOPHIL NFR BLD: 0.1 %
ERYTHROCYTE [DISTWIDTH] IN BLOOD BY AUTOMATED COUNT: 12.6 %
ERYTHROCYTE [DISTWIDTH] IN BLOOD BY AUTOMATED COUNT: 15.8 %
HCT VFR BLD AUTO: 21.9 %
HCT VFR BLD AUTO: 24 %
HGB BLD-MCNC: 7.3 G/DL
HGB BLD-MCNC: 8.2 G/DL
LYMPHOCYTES # BLD AUTO: 1.4 K/UL
LYMPHOCYTES # BLD AUTO: ABNORMAL K/UL
LYMPHOCYTES NFR BLD: 4 %
LYMPHOCYTES NFR BLD: 8.7 %
MCH RBC QN AUTO: 31.7 PG
MCH RBC QN AUTO: 33.2 PG
MCHC RBC AUTO-ENTMCNC: 33.3 G/DL
MCHC RBC AUTO-ENTMCNC: 34.2 G/DL
MCV RBC AUTO: 100 FL
MCV RBC AUTO: 93 FL
MONOCYTES # BLD AUTO: 0.8 K/UL
MONOCYTES # BLD AUTO: ABNORMAL K/UL
MONOCYTES NFR BLD: 4 %
MONOCYTES NFR BLD: 4.7 %
NEUTROPHILS # BLD AUTO: 13.9 K/UL
NEUTROPHILS NFR BLD: 86.4 %
NEUTROPHILS NFR BLD: 91 %
NEUTS BAND NFR BLD MANUAL: 1 %
PLATELET # BLD AUTO: 129 K/UL
PLATELET # BLD AUTO: 133 K/UL
PLATELET BLD QL SMEAR: ABNORMAL
PMV BLD AUTO: 10.1 FL
PMV BLD AUTO: 9.8 FL
RBC # BLD AUTO: 2.2 M/UL
RBC # BLD AUTO: 2.59 M/UL
RPR SER QL: NORMAL
TOXIC GRANULES BLD QL SMEAR: PRESENT
WBC # BLD AUTO: 16.07 K/UL
WBC # BLD AUTO: 17.99 K/UL

## 2018-02-14 PROCEDURE — 85025 COMPLETE CBC W/AUTO DIFF WBC: CPT

## 2018-02-14 PROCEDURE — 25000003 PHARM REV CODE 250: Performed by: ADVANCED PRACTICE MIDWIFE

## 2018-02-14 PROCEDURE — 25000003 PHARM REV CODE 250: Performed by: STUDENT IN AN ORGANIZED HEALTH CARE EDUCATION/TRAINING PROGRAM

## 2018-02-14 PROCEDURE — P9021 RED BLOOD CELLS UNIT: HCPCS

## 2018-02-14 PROCEDURE — 36430 TRANSFUSION BLD/BLD COMPNT: CPT

## 2018-02-14 PROCEDURE — 99251 PR INITIAL INPATIENT CONSULT,LEVL I: CPT | Mod: 24,,, | Performed by: OBSTETRICS & GYNECOLOGY

## 2018-02-14 PROCEDURE — 85007 BL SMEAR W/DIFF WBC COUNT: CPT

## 2018-02-14 PROCEDURE — 72100003 HC LABOR CARE, EA. ADDL. 8 HRS

## 2018-02-14 PROCEDURE — 85027 COMPLETE CBC AUTOMATED: CPT

## 2018-02-14 PROCEDURE — 36415 COLL VENOUS BLD VENIPUNCTURE: CPT

## 2018-02-14 PROCEDURE — 11000001 HC ACUTE MED/SURG PRIVATE ROOM

## 2018-02-14 RX ORDER — HYDROCODONE BITARTRATE AND ACETAMINOPHEN 500; 5 MG/1; MG/1
TABLET ORAL
Status: DISCONTINUED | OUTPATIENT
Start: 2018-02-14 | End: 2018-02-15 | Stop reason: HOSPADM

## 2018-02-14 RX ADMIN — LIOTHYRONINE SODIUM 5 MCG: 5 TABLET ORAL at 11:02

## 2018-02-14 RX ADMIN — LIOTHYRONINE SODIUM 5 MCG: 5 TABLET ORAL at 10:02

## 2018-02-14 RX ADMIN — NAPROXEN 500 MG: 500 TABLET ORAL at 03:02

## 2018-02-14 RX ADMIN — OXYCODONE HYDROCHLORIDE AND ACETAMINOPHEN 1 TABLET: 10; 325 TABLET ORAL at 11:02

## 2018-02-14 RX ADMIN — DOCUSATE SODIUM 200 MG: 100 CAPSULE, LIQUID FILLED ORAL at 10:02

## 2018-02-14 RX ADMIN — OXYCODONE HYDROCHLORIDE AND ACETAMINOPHEN 1 TABLET: 10; 325 TABLET ORAL at 07:02

## 2018-02-14 RX ADMIN — LEVOTHYROXINE SODIUM 200 MCG: 25 TABLET ORAL at 07:02

## 2018-02-14 RX ADMIN — OXYCODONE HYDROCHLORIDE AND ACETAMINOPHEN 1 TABLET: 10; 325 TABLET ORAL at 03:02

## 2018-02-14 NOTE — PROGRESS NOTES
"LABOR NOTE    S:  Pt resting comfortably with epidural, no complaints.  Family at bedside and supportive.     O: BP (!) 103/55   Pulse 68   Temp 97.7 °F (36.5 °C)   Resp 18   Ht 5' 2" (1.575 m)   Wt 57.2 kg (126 lb)   LMP  (LMP Unknown)   SpO2 99%   Breastfeeding? Yes   BMI 23.05 kg/m²     GENERAL: Calm and appropriate affect  NEURO: Alert, oriented, normal speech  ABDOMEN: Nontender, Fundus palpates soft between UC's.  FHT: Baseline 135, moderate BTBV, positive accels, occasional late decels. Cat 2.  CTX: q 2-4 minutes  SVE: 10/100/+2, OA      ASSESSMENT:   27 y.o.  IUP at 41w0d    Patient Active Problem List   Diagnosis    Thyroid cancer    S/P thyroidectomy    Post-surgical hypothyroidism    S/P radioactive iodine thyroid ablation    Echogenic intracardiac focus of fetus on prenatal ultrasound    Mild anemia during pregnancy         PLAN:  Will start pushing, anticipate   Continue close Maternal/Fetal monitoring  Pitocin Augmentation per protocol - currently infusing at 6mu      Verona Evangelista CNM    "

## 2018-02-14 NOTE — ANESTHESIA POSTPROCEDURE EVALUATION
"Anesthesia Post Evaluation    Patient: Idalia Lee    Procedure(s) Performed: * No procedures listed *    Final Anesthesia Type: epidural  Patient location during evaluation: floor  Patient participation: Yes- Able to Participate  Level of consciousness: awake and alert and oriented  Post-procedure vital signs: reviewed and stable  Pain management: adequate  Airway patency: patent  PONV status at discharge: No PONV  Anesthetic complications: no      Cardiovascular status: blood pressure returned to baseline, stable and hypotensive  Respiratory status: unassisted, spontaneous ventilation and room air  Hydration status: euvolemic  Follow-up not needed.        Visit Vitals  BP (!) 96/55 (BP Location: Right arm, Patient Position: Lying)   Pulse 95   Temp 37.1 °C (98.7 °F) (Oral)   Resp 16   Ht 5' 2" (1.575 m)   Wt 57.2 kg (126 lb)   LMP  (LMP Unknown)   SpO2 97%   Breastfeeding? Yes   BMI 23.05 kg/m²       Pain/Angela Score: Pain Rating Prior to Med Admin: 6 (2/14/2018 11:44 AM)  Pain Rating Post Med Admin: 6 (2/14/2018  4:15 AM)      "

## 2018-02-14 NOTE — PROGRESS NOTES
Called placed to PHI Evangelista CNM in regards to patient's current BP and HR. Message left on voicemail for GARETT to call back.     0636- spoke with PHI Evangelista CNM to notify of patient's H/H of 7.3/21.9 and current VS. CNM states will call staff OB to assess patient.     0643- Dr. Pillai to bedside for assessment. Per MD, orders placed for transfusion of 1 unit of PRBC. MD informed RN that patient would like to wait until breastfeeding complete to have MD remove packing and transfuse blood. GARETT called to state that she spoke with MD and states that GARETT Cox will be by to see patient shortly.     0700- report given to PAUL Perez RN. Informed of plan developed by MD and GARETT. Oncoming RN to release blood order for transfusion.

## 2018-02-14 NOTE — L&D DELIVERY NOTE
[facility]  Vaginal Delivery Note    SUMMARY     Spontaneous vaginal delivery of viable male infant at 1639, Apgars 9/9, wt: 3374g   Vigorous infant handed to maternal chest and waiting nursery staff.   Placenta delivered spontaneously and intact via Smith, 3 vessel cord, at 1643. Uterine atony noted - IV Pitocin infusing. Cytotec given NM, along with Methergine and Hemabate IM. Fundus firm following, uterus intact.  Bleeding also noted from bilateral sulcul lacerations, along with bilateral labial lacerations and vaginal lac.  Dr. Pillai present to assist with repairs, using 2-0 and 3-0 vicryl, along with 3-0 Chromic under epidural anesthesia. Anesthesia to bedside to evaluate pt during repairs. No cervical laceration appreciated. Lacerations still oozing so vaginal packing applied using kerlix.  EBL 1321mL.    Pitocin 20U in LR 1000mL infusing.   Mom and baby stable, with baby skin to skin and family bonding well.  Patient tolerated delivery well. Sponge needle and lap counted correctly x2.    Verona Evangelista CNM      Indications: Normal labor  Pregnancy complicated by:   Patient Active Problem List   Diagnosis    Thyroid cancer    S/P thyroidectomy    Post-surgical hypothyroidism    S/P radioactive iodine thyroid ablation    Echogenic intracardiac focus of fetus on prenatal ultrasound    Mild anemia during pregnancy     (spontaneous vaginal delivery)    Third-stage postpartum hemorrhage    Laceration of bilateral vaginal wall or sulcus during delivery     Admitting GA: 41w0d    Delivery Information for  Joshua Lee    Birth information:  YOB: 2020   Time of birth: 4:39 PM   Sex: male   Head Delivery Date/Time: 2018  4:39 PM   Delivery type: Vaginal, Spontaneous Delivery   Gestational Age: 41w0d    Delivery Providers    Delivering clinician:  Verona Evangelista CNM   Other personnel:   Provider Role   Domitila Pillai MD Obstetrician   Estefani Brooks RN  Delivery Nurse   Verona Holcomb RN Charge Nurse                Measurements    Weight:  3374 g Length:  52.7 cm   Head circum.:  33 cm Chest circum.:  33 cm           Assessment    Living status:  Living  Apgars:     1 Minute:   5 Minute:   10 Minute:   15 Minute:   20 Minute:     Skin Color:   1  1       Heart Rate:   2  2       Reflex Irritability:   2  2       Muscle Tone:   2  2       Respiratory Effort:   2  2       Total:   9  9               Apgars Assigned By:  RUBA PERKINS RN         Assisted Delivery Details:    Forceps attempted?:  No  Vacuum extractor attempted?:  No         Shoulder Dystocia    Shoulder dystocia present?:  No           Presentation and Position    Presentation:   Vertex   Position:   Left    Occiput    Anterior            Interventions/Resuscitation    Method:  Bulb Suctioning       Cord    Vessels:  3 vessels  Complications:  None  Cord Blood Disposition:  Sent with Baby  Gases Sent?:  No       Placenta    Date and time:  2018  4:43 PM  Removal:  Spontaneous  Appearance:  Intact  Placenta disposition:  discarded           Labor Events:       labor: No     Labor Onset Date/Time:         Dilation Complete Date/Time:         Start Pushing Date/Time: 2018 05:45     Rupture Date/Time:              Rupture type:           Fluid Amount:        Fluid Color:        Fluid Odor:        Membrane Status (PeriCalm): SRM (Spontaneous Rupture)      Rupture Date/Time (PeriCalm): 2018 00:26:00      Fluid Amount (PeriCalm):        Fluid Color (PeriCalm): Bloody       steroids:       Antibiotics given for GBS: No     Induction:       Indications for induction:        Augmentation: oxytocin     Indications for augmentation:       Labor complications: None     Additional complications:          Cervical ripening:                     Delivery:      Episiotomy: None     Indication for Episiotomy:       Perineal Lacerations:   Repaired:      Periurethral Laceration:    Repaired:     Labial Laceration: bilateral Repaired: Yes   Sulcus Laceration: bilateral Repaired: Yes   Vaginal Laceration: Yes Repaired: Yes   Cervical Laceration:   Repaired:     Repair suture:       Repair # of packets: 6     Vaginal delivery QBL (mL): 1321      QBL (mL): 0     Combined Blood Loss (mL): 1321     Vaginal Sweep Performed: Yes     Surgicount Correct:         Other providers:       Anesthesia    Method:  Epidural          Details (if applicable):  Trial of Labor      Categorization:      Priority:     Indications for :     Incision Type:       Additional  information:  Forceps:    Vacuum:    Breech:    Observed anomalies    Other (Comments):

## 2018-02-14 NOTE — PROGRESS NOTES
1709 anesthesia notified of BP drop; pt feels lightheaded and nauseated; Dr. Howard notified and at bedside; additional IV fluids administered.

## 2018-02-14 NOTE — PROGRESS NOTES
Called by Verona Evangelista CNM requesting consult regarding patient's symptomatic anemia. Patient is PPD1 s/p . CBL 2321 due to atony (treated with hemabate, methergine and cytotec) and bilateral sulcal tears. I assisted in the repair due to extensiveness. Vaginal mucosa was oozy at the end of the procedure and vaginal packing with moses was placed. H/h 10/29.9 prior to delievery > 7.3/21.9 this am. Overnight BP began dropping 90s/50s and this am pulse is low 100s. Patient feels very tired and slightly dizzy. Due to symptomatic anemia, recommended transfusion of 1 u pRBC. Patient declined pulling of vaginal packing due to currently breastfeeding. Will follow up with the patient after transfusion. Residents notified.     Domitila Pillai MD  OB/GYN

## 2018-02-14 NOTE — PROGRESS NOTES
Patient assessed by MD at bedside s/p 1 u pRBCs this morning.   She reports no symptoms of anemia. She feels tired but denies the dizziness/lightheadedness of which she complained this morning.   She has not ambulated, as Moses is in place.   She has been eating and drinking well since delivery.     On exam, VSS, afebrile. She is sitting up in bed in NAD.   Lungs CTA.   CV RRR, no LE edema.   Abdomen soft, nontender.     A/P: S/p 1 u pRBCs, with improvement in anemia symptoms.   Discussed with staff, no further transfusion necessary at this time.   D/c moses and ambulate with assistance.     Redd Sun M.D.  Obstetrics & Gynecology  PGY-1

## 2018-02-15 VITALS
TEMPERATURE: 98 F | SYSTOLIC BLOOD PRESSURE: 104 MMHG | BODY MASS INDEX: 23.19 KG/M2 | HEIGHT: 62 IN | OXYGEN SATURATION: 100 % | WEIGHT: 126 LBS | RESPIRATION RATE: 18 BRPM | HEART RATE: 80 BPM | DIASTOLIC BLOOD PRESSURE: 66 MMHG

## 2018-02-15 PROBLEM — Z37.9 NORMAL LABOR: Status: RESOLVED | Noted: 2018-02-13 | Resolved: 2018-02-15

## 2018-02-15 PROCEDURE — 25000003 PHARM REV CODE 250: Performed by: ADVANCED PRACTICE MIDWIFE

## 2018-02-15 PROCEDURE — 25000003 PHARM REV CODE 250: Performed by: STUDENT IN AN ORGANIZED HEALTH CARE EDUCATION/TRAINING PROGRAM

## 2018-02-15 RX ADMIN — NAPROXEN 500 MG: 500 TABLET ORAL at 06:02

## 2018-02-15 RX ADMIN — LEVOTHYROXINE SODIUM 200 MCG: 25 TABLET ORAL at 06:02

## 2018-02-15 RX ADMIN — LIOTHYRONINE SODIUM 5 MCG: 5 TABLET ORAL at 08:02

## 2018-02-15 RX ADMIN — OXYCODONE HYDROCHLORIDE AND ACETAMINOPHEN 1 TABLET: 10; 325 TABLET ORAL at 07:02

## 2018-02-15 RX ADMIN — PRENATAL VIT W/ FE FUMARATE-FA TAB 27-0.8 MG 1 TABLET: 27-0.8 TAB at 08:02

## 2018-02-15 NOTE — LACTATION NOTE
"Lactation rounds. Observed excellent feeding on both breasts. Lactation discharge instructions reviewed, including contact numbers and available resources. Mother reports feedings are going well and denies pain. Infant weight and output adequate. Reviewed what to expect as milk is coming in, how to tell baby is getting enough, manual expression of breastmilk, cue based feeding on demand, skin to skin, etc. Encouraged to call with any questions or concerns. Mother voices understanding.     02/15/18 1000   Maternal Infant Assessment   Breast Shape round   Breast Density soft   Areola elastic   Nipple(s) everted   Infant Assessment   Chin/Jaw receding   Tongue/Frenulum Symptoms frenulum tight   Frenulum tight   Sucking Reflex present   Rooting Reflex present   Swallow Reflex present   LATCH Score   Latch 2-->grasps breast, tongue down, lips flanged, rhythmic sucking   Audible Swallowing 2-->spontaneous and intermittent (24 hrs old)   Type Of Nipple 2-->everted (after stimulation)   Comfort (Breast/Nipple) 1-->filling, red/small blisters/bruises, mild/mod discomfort   Hold (Positioning) 1-->minimal assist, teach one side: mother does other, staff holds   Score (less than 7 for 2/more consecutive times, consult Lactation Consultant) 8   Maternal Infant Feeding   Maternal Emotional State assist needed;relaxed   Infant Positioning cross-cradle;clutch/"football"   Signs of Milk Transfer audible swallow;infant jaw motion present   Time Spent (min) 30-60 min   Nipple Shape After Feeding, Left (rounded)   Nipple Shape After Feeding, Right (rounded)   Latch Assistance yes   Feeding Infant   Effective Latch During Feeding yes   Audible Swallow yes   Suck/Swallow Coordination present   Skin-to-Skin Contact During Feeding yes   Lactation Interventions   Attachment Promotion breastfeeding assistance provided;counseling provided;skin-to-skin contact encouraged   Breastfeeding Assistance assisted with positioning;both breasts offered " each feeding;feeding cue recognition promoted;feeding on demand promoted;feeding session observed;infant latch-on verified;infant suck/swallow verified;support offered   Maternal Breastfeeding Support encouragement offered;lactation counseling provided   Latch Promotion positioning assisted

## 2018-02-15 NOTE — PLAN OF CARE
Problem: Patient Care Overview  Goal: Plan of Care Review  Outcome: Outcome(s) achieved Date Met: 02/15/18  Plan of care reviewed . Doing well going home. Tolerating diet well, ambulating without c/o dizziness no weakness.BP 83/50 asymptomatic. C.S.Pfingstag aware  voiding without difficulty.moderate rubra.,Pain controlled with oral pain meds.breastfeeding infant.

## 2018-02-15 NOTE — SUBJECTIVE & OBJECTIVE
Hospital course: 2018: Admitted to [facility] at 0100 for active labor with SROM at 0026.  Transferred to L&D at 0520 for audible late decels, 8/80%/-1.   at 1639 of male infant. Postpartum hemorrhage secondary to uterine atony resolved with IV Pitocin, Cytotec, Methergine, & Hemabate. Bilateral sulcul & labial lacerations, along with vaginal laceration repaired. QBL:   Received 1 unit PRBC  D/c home on 2/15/18, stable    Interval History: PPD#2, stable    She is doing well this morning. She is tolerating a regular diet without nausea or vomiting. She is voiding spontaneously. She is ambulating. She has passed flatus, and has not a BM. Vaginal bleeding is mild. She denies fever or chills. Abdominal pain is mild and controlled with oral medications. She is breastfeeding. She desires circumcision for her male baby: no.    Objective:     Vital Signs (Most Recent):  Temp: 98.4 °F (36.9 °C) (02/15/18 08)  Pulse: 76 (02/15/18 0803)  Resp: 18 (02/15/18 08)  BP: (!) 83/50 (02/15/18 08)  SpO2: 98 % (02/15/18 0803) Vital Signs (24h Range):  Temp:  [98.1 °F (36.7 °C)-98.7 °F (37.1 °C)] 98.4 °F (36.9 °C)  Pulse:  [] 76  Resp:  [16-18] 18  SpO2:  [97 %-100 %] 98 %  BP: ()/(50-57) 83/50     Weight: 57.2 kg (126 lb)  Body mass index is 23.05 kg/m².      Intake/Output Summary (Last 24 hours) at 02/15/18 0915  Last data filed at 02/15/18 0500   Gross per 24 hour   Intake              350 ml   Output             3900 ml   Net            -3550 ml       Significant Labs:  Lab Results   Component Value Date    GROUPTRH O POS 2018    HEPBSAG Negative 2017    STREPBCULT No Group B Streptococcus isolated 2018       Recent Labs  Lab 18  1334   HGB 8.2*   HCT 24.0*       CBC:   Recent Labs  Lab 18  1334   WBC 17.99*   RBC 2.59*   HGB 8.2*   HCT 24.0*   *   MCV 93   MCH 31.7*   MCHC 34.2     I have personallly reviewed all pertinent lab results from the last 24 hours.    Physical Exam:    Constitutional: She is oriented to person, place, and time. She appears well-developed and well-nourished. No distress.       Cardiovascular: Normal rate.     Pulmonary/Chest: Effort normal.        Abdominal: Soft.     Genitourinary: Vagina normal.           Musculoskeletal: Normal range of motion.       Neurological: She is alert and oriented to person, place, and time.    Skin: Skin is warm and dry. She is not diaphoretic.    Psychiatric: She has a normal mood and affect.   breasts soft, nipples intact  Lochia small rubra

## 2018-02-15 NOTE — DISCHARGE SUMMARY
[facility]  Obstetrics  Discharge Summary      Patient Name: Idalia Lee  MRN: [id number]  Admission Date: 2018  Hospital Length of Stay: 2 days  Discharge Date and Time:  02/15/2018 9:21 AM  Attending Physician: Elda Frederick MD   Discharging Provider: Indira Armstrong CNM  Primary Care Provider: Primary Doctor No    HPI:    28 y/o  at 41weeks with c/o contractions starting at 1700.  Contractions became stronger around 2100 and are now every 2-3 minutes.  Denies bleeding or leaking amniotic fluid and reports normal fetal movement today. Did have prenatal testing today which was normal.  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  GBS was negative.  O+, rubella immune    * No surgery found *     Hospital Course:   2018: Admitted to [facility] at 0100 for active labor with SROM at 0026.  Transferred to L&D at 0520 for audible late decels, 8/80%/-1.   at 1639 of male infant. Postpartum hemorrhage secondary to uterine atony resolved with IV Pitocin, Cytotec, Methergine, & Hemabate. Bilateral sulcul & labial lacerations, along with vaginal laceration repaired. QBL:   Received 1 unit PRBC  D/c home on 2/15/18, stable        Final Active Diagnoses:    Diagnosis Date Noted POA    PRINCIPAL PROBLEM:   (spontaneous vaginal delivery) [O80] 2018 Not Applicable    Third-stage postpartum hemorrhage [O72.0] 2018 No    Laceration of bilateral vaginal wall or sulcus during delivery [O71.4] 2018 No      Problems Resolved During this Admission:    Diagnosis Date Noted Date Resolved POA    Normal labor [O80, Z37.9] 2018 Not Applicable    Normal labor [O80, Z37.9] 2018 02/15/2018 Not Applicable        Labs:   CBC   Recent Labs  Lab 18  1756 18  0538 18  1334   WBC 23.12* 16.07* 17.99*   HGB 10.2* 7.3* 8.2*   HCT 29.9* 21.9* 24.0*     133* 129*       Feeding Method: breast    Immunizations     Date Immunization Status Dose Route/Site Given by    02/13/18 1954 MMR Incomplete 0.5 mL Subcutaneous/Left deltoid     02/13/18 1954 Tdap Incomplete 0.5 mL Intramuscular/Left deltoid           Delivery:    Episiotomy: None   Lacerations:     Repair suture:     Repair # of packets: 6   Blood loss (ml): 1321     Birth information:  YOB: 2020   Time of birth: 4:39 PM   Sex: male   Delivery type: Vaginal, Spontaneous Delivery   Gestational Age: 41w0d    Delivery Clinician:      Other providers:       Additional  information:  Forceps:    Vacuum:    Breech:    Observed anomalies      Living?:           APGARS  One minute Five minutes Ten minutes   Skin color:         Heart rate:         Grimace:         Muscle tone:         Breathing:         Totals: 9  9        Placenta: Delivered:       appearance    Pending Diagnostic Studies:     None        Plans AGUILAR for contraception    Discharged Condition: good    Disposition: Home or Self Care    Follow Up:  Follow-up Information     Verona Evangelista CNM. Schedule an appointment as soon as possible for a visit in 4 weeks.    Specialty:  Obstetrics and Gynecology  Why:  in 4-6 weeks for postpartum exam  Contact information:  [address]  [facility] [address]  [phone]                 Patient Instructions:     Notify your health care provider if you experience any of the following:  temperature >100.4     Notify your health care provider if you experience any of the following:  persistent nausea and vomiting or diarrhea     Notify your health care provider if you experience any of the following:  severe uncontrolled pain     Notify your health care provider if you experience any of the following:  redness, tenderness, or signs of infection (pain, swelling, redness, odor or green/yellow discharge around incision site)     Notify your health care provider if you experience any of the  following:  difficulty breathing or increased cough     Notify your health care provider if you experience any of the following:  severe persistent headache     Notify your health care provider if you experience any of the following:  persistent dizziness, light-headedness, or visual disturbances     Notify your health care provider if you experience any of the following:  increased confusion or weakness     Notify your health care provider if you experience any of the following:   Order Comments: Call with fever, increased pain or bleeding, signs of postpartum depression, problems with breastfeeding or prn       Medications:  Current Discharge Medication List      CONTINUE these medications which have NOT CHANGED    Details   Lactobacillus rhamnosus GG (CULTURELLE) 10 billion cell capsule Take 1 capsule by mouth once daily.      levothyroxine (SYNTHROID) 200 MCG tablet Take 1 tablet (200 mcg total) by mouth once daily. Brandname  Qty: 30 tablet, Refills: 11      liothyronine (CYTOMEL) 5 MCG Tab Take 1 tablet (5 mcg total) by mouth 2 (two) times daily.  Qty: 60 tablet, Refills: 3      PRENATAL VIT/IRON FUM/FOLIC AC (PRENATAL 1+1 ORAL) Take by mouth.             Indira Armstrong, GARETT  Obstetrics  [facility]

## 2018-02-15 NOTE — PROGRESS NOTES
Chart reviewed. Vitals signs stable. BP normotensive per patient's history of prenatal blood pressures. MD available if patient develops symptomatic anemia again.

## 2018-02-15 NOTE — PROGRESS NOTES
discharge to home with baby in arms per w/c  With . No distress. No c/o weakness no dizziness no c/o pain f/u in 4 -6 weeks.

## 2018-02-15 NOTE — PHYSICIAN QUERY
"PT Name: Idalia Lee  MR #: [id number]    Physician Query Form - Hematology Clarification      CDS/: GEORGINA [email or web address]          Contact information:[email or web address]    This form is a permanent document in the medical record.      Query Date: February 15, 2018    By submitting this query, we are merely seeking further clarification of documentation. Please utilize your independent clinical judgment when addressing the question(s) below.    The Medical record contains the following:   Indicators  Supporting Clinical Findings Location in Medical Record   X "Anemia" documented symptomatic anemia OB Progress note @655am   X H & H = Hgb=7.3-12.9  Hct=21.9-37.8 LAB -    BP =                     HR=      "GI bleeding" documented     X Acute bleeding (Non GI site) CBL 2321 due to atony (treated with hemabate, methergine and cytotec) and bilateral sulcal tears OB Progress note @655am   X Transfusion(s) Received 1 unit PRBC OB Progress note 2/15@917am    Treatment:     X Other:   (spontaneous vaginal delivery) OB Progress note 2/15@917am     Provider, please specify diagnosis or diagnoses associated with above clinical findings.    [x] Acute blood loss anemia  [  ] Other Hematological Diagnosis (please specify): _________________________________  [  ] Clinically Undetermined       Please document in your progress notes daily for the duration of treatment, until resolved, and include in your discharge summary.                                                                                                      "

## 2018-02-15 NOTE — LACTATION NOTE
Lactation rounds. Assisted patient with feeding attempt. Reports baby has not  well since birth. Baby with difficulty achieving and maintaining latch. Oral assessment and suck exercises performed. Limited mobility of upper lip and tongue noted, and tongue disorganized not remaining past gumline. Suck exercises to not appear to be helpful at this time, however baby then went to breast and achieved and maintained effective latch for several minutes at a time. Patient relatched with excellent technique when baby lost latch. Reviewed basics, offered encouragedment, and encouraged to call for assistance as needed. Discussed plan and whether or not to introduce pump at this time, due to previously not feeding well and current feeding difficulty. Patient opted to continue to work with baby and will hand express milk if feedings ineffective. Will request pump when and if she feels she needs it. Will re-eval in the morning and extablish further plan of care. Voices understanding.     02/14/18 1640   Maternal Infant Assessment   Breast Shape wide;round   Breast Density soft   Areola elastic   Nipple(s) everted   Infant Assessment   Tongue/Frenulum Symptoms frenulum tight   Frenulum tight   Sucking Reflex present   Rooting Reflex present   Swallow Reflex present   LATCH Score   Latch 1-->repeated attempts, holds nipple in mouth, stimulate to suck   Audible Swallowing 1-->a few with stimulation   Type Of Nipple 2-->everted (after stimulation)   Comfort (Breast/Nipple) 1-->filling, red/small blisters/bruises, mild/mod discomfort   Hold (Positioning) 1-->minimal assist, teach one side: mother does other, staff holds   Score (less than 7 for 2/more consecutive times, consult Lactation Consultant) 6   Pain Reassessment   Pain Rating Post Med Admin 4   Maternal Infant Feeding   Maternal Emotional State assist needed;relaxed   Infant Positioning cross-cradle   Signs of Milk Transfer audible swallow;infant jaw motion present    Time Spent (min) 30-60 min   Latch Assistance yes   Breastfeeding Education milk expression, hand   Feeding Infant   Effective Latch During Feeding yes  (does not maintain, but improved throughout consult)   Audible Swallow yes   Suck/Swallow Coordination present   Lactation Interventions   Attachment Promotion breastfeeding assistance provided;counseling provided;skin-to-skin contact encouraged   Breastfeeding Assistance assisted with positioning;both breasts offered each feeding;feeding cue recognition promoted;feeding on demand promoted;infant latch-on verified;infant suck/swallow verified;milk expression/pumping;support offered   Maternal Breastfeeding Support encouragement offered;lactation counseling provided   Latch Promotion positioning assisted;infant moved to breast

## 2018-02-15 NOTE — PROGRESS NOTES
[facility]  Obstetrics  Postpartum Progress Note    Patient Name: Idalia Lee  MRN: [id number]  Admission Date: 2018  Hospital Length of Stay: 2 days  Attending Physician: Elda Frederick MD  Primary Care Provider: Primary Doctor No    Subjective:     Principal Problem: (spontaneous vaginal delivery)    Hospital course: 2018: Admitted to [facility] at 0100 for active labor with SROM at 0026.  Transferred to L&D at 0520 for audible late decels, 8/80%/-1.   at 1639 of male infant. Postpartum hemorrhage secondary to uterine atony resolved with IV Pitocin, Cytotec, Methergine, & Hemabate. Bilateral sulcul & labial lacerations, along with vaginal laceration repaired. QBL:   Received 1 unit PRBC  D/c home on 2/15/18, stable    Interval History: PPD#2, stable    She is doing well this morning. She is tolerating a regular diet without nausea or vomiting. She is voiding spontaneously. She is ambulating. She has passed flatus, and has not a BM. Vaginal bleeding is mild. She denies fever or chills. Abdominal pain is mild and controlled with oral medications. She is breastfeeding. She desires circumcision for her male baby: no.    Objective:     Vital Signs (Most Recent):  Temp: 98.4 °F (36.9 °C) (02/15/18 08)  Pulse: 76 (02/15/18 0803)  Resp: 18 (02/15/18 08)  BP: (!) 83/50 (02/15/18 08)  SpO2: 98 % (02/15/18 08) Vital Signs (24h Range):  Temp:  [98.1 °F (36.7 °C)-98.7 °F (37.1 °C)] 98.4 °F (36.9 °C)  Pulse:  [] 76  Resp:  [16-18] 18  SpO2:  [97 %-100 %] 98 %  BP: ()/(50-57) 83/50     Weight: 57.2 kg (126 lb)  Body mass index is 23.05 kg/m².      Intake/Output Summary (Last 24 hours) at 02/15/18 0915  Last data filed at 02/15/18 0500   Gross per 24 hour   Intake              350 ml   Output             3900 ml   Net            -3550 ml       Significant Labs:  Lab Results   Component Value Date    GROUPTRH O POS 2018    HEPBSAG Negative 2017     STREPBCULT No Group B Streptococcus isolated 2018       Recent Labs  Lab 18  1334   HGB 8.2*   HCT 24.0*       CBC:   Recent Labs  Lab 18  1334   WBC 17.99*   RBC 2.59*   HGB 8.2*   HCT 24.0*   *   MCV 93   MCH 31.7*   MCHC 34.2     I have personallly reviewed all pertinent lab results from the last 24 hours.    Physical Exam:   Constitutional: She is oriented to person, place, and time. She appears well-developed and well-nourished. No distress.       Cardiovascular: Normal rate.     Pulmonary/Chest: Effort normal.        Abdominal: Soft.     Genitourinary: Vagina normal.           Musculoskeletal: Normal range of motion.       Neurological: She is alert and oriented to person, place, and time.    Skin: Skin is warm and dry. She is not diaphoretic.    Psychiatric: She has a normal mood and affect.   breasts soft, nipples intact  Lochia small rubra      Assessment/Plan:     27 y.o. female  for:    *  (spontaneous vaginal delivery)    PPD#2, stable  Will d/c home today        Laceration of bilateral vaginal wall or sulcus during delivery    Stable after one unit PRBC yesterday  Ambulating without assistance, feeling well            Disposition: As patient meets milestones, will plan to discharge today.    Indira Armstrong CNM  Obstetrics  [facility]

## 2018-02-17 ENCOUNTER — TELEPHONE (OUTPATIENT)
Dept: LACTATION | Facility: CLINIC | Age: 28
End: 2018-02-17

## 2018-02-17 LAB
BLD PROD TYP BPU: NORMAL
BLOOD UNIT EXPIRATION DATE: NORMAL
BLOOD UNIT TYPE CODE: 5100
BLOOD UNIT TYPE CODE: 9500
BLOOD UNIT TYPE CODE: 9500
BLOOD UNIT TYPE: NORMAL
CODING SYSTEM: NORMAL
DISPENSE STATUS: NORMAL
NUM UNITS TRANS PACKED RBC: NORMAL
NUM UNITS TRANS PACKED RBC: NORMAL
TRANS ERYTHROCYTES VOL PATIENT: NORMAL ML

## 2018-02-18 NOTE — TELEPHONE ENCOUNTER
"Patient called support line and stated several concerns about breastfeeding. States her nipples are very sore with an initial latch score of 10 that decreases to a 5. Reports that she is using a nipple cream and describes her nipple as "scabbing". States that she thinks the baby is getting a deep latch and that her nipple does not appear "pinched" and is "round and long" when he unlatches. Discussed use of hydrogels or prescription APNO and to call her midwife. Her milk came in yesterday and states baby is nursing 8 or more feedings/24 hours on cue and breasts are softer after nursing on both breasts and baby falls asleep and is content. Baby has had one stool almost 24 hours ago and patient describes it as "sticky and meconium". Describes urine diapers as pale yellow and large and has 2 since midnight. Discussed expected output for a baby at 4 days of age and instructed to call the baby's pediatrician for further guidance with output and follow up appointment which is not until Tuesday 2/20/18. Patient also stated she has had a headache since she was discharged 2 days ago and has been taking Ibuprophen "around the clock:". Denies any other symptoms- blurred vision, fever, chills, or dizziness. Instructed to call her midwife for unrelieved headache. OPC made for this week. Instructed patient to call Lactation support line after she follows up with pediatrician to report progress or other assistance.   "

## 2018-02-19 ENCOUNTER — LACTATION CONSULT (OUTPATIENT)
Dept: LACTATION | Facility: CLINIC | Age: 28
End: 2018-02-19
Payer: COMMERCIAL

## 2018-02-19 PROCEDURE — 99199 UNLISTED SPECIAL SVC PX/RPRT: CPT | Mod: S$GLB,,, | Performed by: ADVANCED PRACTICE MIDWIFE

## 2018-02-19 NOTE — PROGRESS NOTES
Lactation Out-Patient Consultation    Reason for consultation :Sore Nipples. Pre and post feed weight. Breast feeding assessment.                 Babys : 2020    Hospital of birth: [facility]    Maternal history:    G   1 P 1    RA1    Weeks gestation at birth 41 0/7    Delivery type:    Vaginal       Complications with pregnancy     No    Labor/birth complications Yes    : large blood loss    birth weight: 7#7    Maternal history of:  Anemia Yes    -mild    High blood pressure       No   Diabetes    No    PCOS       No    Thyroid disorder  Yes ( Synthroid replacement Yes)  : hypothyroidism secondary to thyroid cancer- Thyroidectomy    Infertility/Pregnancy losses  No    Hx of Obesity      No   Hx of breast surgery   No    Known health problems of baby  No       Breastfeeding hx during hospital stay:  First feeding at the breast Yes     Initial feeding delayed   No:      Feeding hx frequency/duration: feeding well in hospital    Milk came in : Saturday    Breast engorgement : Normal, managed    Sore nipples: Yes at home    Latch ability : good     hypoglycemia  No     jaundice   No    Supplemented in hospital  No    Pacifier use     No  Discharge date/ weight: 6#15.1  Breastfeeding hx since arrival home:    Pediatrician visit/weight checks: none as yet appointment scheduled for 2pm today.        Feeding frequency/duration : infant has been feeding on cue    Use 1 or more breasts at each feeding ; typically nurses both breast    Do you see/hear long, rhythmic sucks:  Yes      Babys behavior at breast : nurses then to sleep. Length of feedings vary.    Do you awaken baby for most feedings   No    Longest stretch of sleep: 2.5 hours    Does the baby act content and sleep at the end of feedings : Yes   Now that milk is in.    Is the baby able to latch onto both breasts without difficulty  Yes : but has been painful. Pt was unable to get a deep latch. Feels that it has improved since  speaking to  on Saturday. Was 10 that decreases to 5. At this time we are improving 7 that goes to 4-5    Breast engorgement comfort measures being used Yes      Nipple pain compared to pain during hospitalization :     Worse    Nipple comfort measures being used  Yes      Diaper counts last 24hrs:   urine diapers 5-6 + , stool diapers 5   , stool appearance Yellow loose .    Is baby being supplemented : baby has been given Two supplement bottles total, both with breast milk    Using a pacifier  No    Are you using a breast pump Yes     '    Breastfeeding goals; pt would like to exclusively breast feed infant , pain free.     Feeding assessment :    Pre-feeding naked weight : 7#1.5 3218 gm    Pre-feeding weight : above    Babys appearance : normal healthy    Babys oral anatomy;  No: recessed chin. Tight upper lip frenum, blister on upper lip. Short square tongue with some limited mobility    Breast assessment : Normal, full    Nipple/areolae assessment:everted, elastic    Maternal ability to position and latch baby onto breasts : fair. LC observed mother latch infant to right breast. Mother using cross cradle hold, infant slightly on the back, latch shallow mother c/o nipple pain of 7 that decreased to 5  Lc assisted mother with infant position adjustment to achieve a deeper latch. Infant placed more tummy to tummy, chin to the breast, wide open mouth and asymmetric latch achieved with intal latch pain for 5 that decreased to 0. Nipple appears long and round when out the baby's mouth     Asymmetric latch achieved : Yes  , with assistance    Nutritive sucks observed Yes  : with breast compression! This mom needs to compress her breast the whole feeding. Swallows are infrequent without compression, with compress swallows /sucks are rhythmic. If mother does not keep the baby close and compress the sucking is more non- nutritive than  Nutritive    BabyS behavior at breast : drinks with compression. Needs  stimulation to continue the feeding    Breastfeed L side  Minutes: offered left baby content did not nurse left side. Mother pumped  Breastfeed R side  Minutes; 20    Total feed time  Minutes: 20 min    Post- feeding weight*: 3294 (7#4.2) subtracted 18 gm for diaper wt ,total wt: 3276gm .. Wt gain of 58 grams    BabyS post breastfeeding behavior : content    Supplementation provided : none      Mother to continue to feed on cue. Continue to use asymmetric latch to achieve a deep latch. Use infant stimulation and breast compression to continue the feeding. Infant nurses well with breast compression. A lot of non nutritive nursing without it. Noted some limited mobility of tongue , but does well with milk transfer with the use of breast compression. Mother will continue to feed as normal, using compression. Pump as needed and pump about 2 times a day for extra stimulation. Keep accurate I&O.  Supplement with expressed breast milk , use paced bottle feeding, if parents feel that baby is not transferring milk well.  Tongue tie support reference information given. Call LC as needed.

## 2018-02-21 ENCOUNTER — PATIENT MESSAGE (OUTPATIENT)
Dept: ENDOCRINOLOGY | Facility: CLINIC | Age: 28
End: 2018-02-21

## 2018-02-21 NOTE — ADDENDUM NOTE
Addendum  created 02/21/18 1438 by Jason Whittaker MD    Anesthesia Intra Blocks edited, Sign clinical note

## 2018-02-26 ENCOUNTER — TELEPHONE (OUTPATIENT)
Dept: LACTATION | Facility: CLINIC | Age: 28
End: 2018-02-26

## 2018-02-26 NOTE — TELEPHONE ENCOUNTER
MARY spoke to mother. Mother did an OPC with MARY last week. Mother reports things are a little better but she is still feeling pain.She describes the nipple pain as if her nipple is being pinched. She complains that baby is cluster feeding,and gassy. Baby is having 5-6 wet and 3-4 dirty diapers a day. She wants to know if the continued pain is normal. MARY states this is not normal. MARY pulled up OPC notes. Mother had been told that MARY thought baby may have tethered oral tissue of upper lip and back of tongue. She was given resources to call but had not tried to call them yet. MARY again suggested that she have baby evaluated by a Speech language pathologist or occupational therapist. Mother will call to have baby evaluated. Once baby is evaluated mother may call back so LC may follow up to help mother with needs.

## 2018-02-28 ENCOUNTER — TELEPHONE (OUTPATIENT)
Dept: LACTATION | Facility: CLINIC | Age: 28
End: 2018-02-28

## 2018-03-03 ENCOUNTER — PATIENT MESSAGE (OUTPATIENT)
Dept: OBSTETRICS AND GYNECOLOGY | Facility: CLINIC | Age: 28
End: 2018-03-03

## 2018-03-13 ENCOUNTER — PATIENT MESSAGE (OUTPATIENT)
Dept: ENDOCRINOLOGY | Facility: CLINIC | Age: 28
End: 2018-03-13

## 2018-03-15 ENCOUNTER — TELEPHONE (OUTPATIENT)
Dept: ENDOCRINOLOGY | Facility: CLINIC | Age: 28
End: 2018-03-15

## 2018-03-15 NOTE — TELEPHONE ENCOUNTER
----- Message from Chely Wolfe LPN sent at 3/15/2018  9:07 AM CDT -----  Dr. Rosa, pt is sched for 4 mo f/u (pregnant) on 5/17/18.   You will be out of the office.  Next avail is 6/14/18.  Is this ok?

## 2018-03-16 ENCOUNTER — LAB VISIT (OUTPATIENT)
Dept: LAB | Facility: HOSPITAL | Age: 28
End: 2018-03-16
Attending: INTERNAL MEDICINE
Payer: COMMERCIAL

## 2018-03-16 DIAGNOSIS — C73 THYROID CANCER: ICD-10-CM

## 2018-03-16 DIAGNOSIS — O99.283 HYPOTHYROID IN PREGNANCY, ANTEPARTUM, THIRD TRIMESTER: ICD-10-CM

## 2018-03-16 DIAGNOSIS — E03.9 HYPOTHYROID IN PREGNANCY, ANTEPARTUM, THIRD TRIMESTER: ICD-10-CM

## 2018-03-16 LAB
T3FREE SERPL-MCNC: 2.5 PG/ML
T4 FREE SERPL-MCNC: 1.52 NG/DL
TSH SERPL DL<=0.005 MIU/L-ACNC: <0.01 UIU/ML

## 2018-03-16 PROCEDURE — 36415 COLL VENOUS BLD VENIPUNCTURE: CPT | Mod: PO

## 2018-03-16 PROCEDURE — 84439 ASSAY OF FREE THYROXINE: CPT

## 2018-03-16 PROCEDURE — 84481 FREE ASSAY (FT-3): CPT

## 2018-03-16 PROCEDURE — 84443 ASSAY THYROID STIM HORMONE: CPT

## 2018-03-19 ENCOUNTER — TELEPHONE (OUTPATIENT)
Dept: ENDOCRINOLOGY | Facility: CLINIC | Age: 28
End: 2018-03-19

## 2018-03-19 DIAGNOSIS — E03.9 HYPOTHYROIDISM, UNSPECIFIED TYPE: Primary | ICD-10-CM

## 2018-03-19 RX ORDER — LEVOTHYROXINE SODIUM 175 UG/1
175 TABLET ORAL DAILY
Qty: 30 TABLET | Refills: 11 | Status: SHIPPED | OUTPATIENT
Start: 2018-03-19 | End: 2018-05-02 | Stop reason: SDUPTHER

## 2018-03-19 NOTE — TELEPHONE ENCOUNTER
Have her decrease synthroid from 200 mcg to 175 mcg. Continue cytomel. Check TSH, FT4, T3 in 4 weeks.

## 2018-03-20 ENCOUNTER — PATIENT MESSAGE (OUTPATIENT)
Dept: ENDOCRINOLOGY | Facility: CLINIC | Age: 28
End: 2018-03-20

## 2018-04-09 ENCOUNTER — POSTPARTUM VISIT (OUTPATIENT)
Dept: OBSTETRICS AND GYNECOLOGY | Facility: CLINIC | Age: 28
End: 2018-04-09
Payer: COMMERCIAL

## 2018-04-09 VITALS
BODY MASS INDEX: 19.45 KG/M2 | HEIGHT: 62 IN | WEIGHT: 105.69 LBS | SYSTOLIC BLOOD PRESSURE: 98 MMHG | DIASTOLIC BLOOD PRESSURE: 62 MMHG

## 2018-04-09 PROCEDURE — 99999 PR PBB SHADOW E&M-EST. PATIENT-LVL II: CPT | Mod: PBBFAC,,, | Performed by: ADVANCED PRACTICE MIDWIFE

## 2018-04-09 PROCEDURE — 0503F POSTPARTUM CARE VISIT: CPT | Mod: S$GLB,,, | Performed by: ADVANCED PRACTICE MIDWIFE

## 2018-04-09 NOTE — PROGRESS NOTES
"Subjective:       Idalia Lee is a 28 y.o. female who presents for a postpartum visit. She is 8 weeks postpartum following a spontaneous vaginal delivery. I have fully reviewed the prenatal and intrapartum course. The delivery was at term gestational weeks. Outcome: spontaneous vaginal delivery. Anesthesia: epidural. Postpartum course has been uneventful. Baby's course has been WNL. Baby is feeding by breast. Bleeding no bleeding. Bowel function is normal. Bladder function is normal. Patient is not sexually active. Contraception method is rhythm method. Postpartum depression screening: negative.    The following portions of the patient's history were reviewed and updated as appropriate: allergies, current medications, past family history, past medical history, past social history, past surgical history and problem list.    Review of Systems  A comprehensive review of systems was negative.     Objective:      BP 98/62   Ht 5' 2" (1.575 m)   Wt 48 kg (105 lb 11.4 oz)   LMP  (LMP Unknown)   Breastfeeding? Yes   BMI 19.33 kg/m²    General:  alert, appears stated age and cooperative    Breasts:  inspection negative, no nipple discharge or bleeding, no masses or nodularity palpable and lactating    Lungs: clear to auscultation bilaterally   Heart:  regular rate and rhythm, S1, S2 normal, no murmur, click, rub or gallop   Abdomen: normal findings: soft, non-tender    Vulva:  normal   Vagina: normal vagina, no discharge, exudate, lesion, or erythema   Cervix:  no cervical motion tenderness   Corpus: normal size, contour, position, consistency, mobility, non-tender   Adnexa:  no mass, fullness, tenderness   Rectal Exam: Not performed.          Assessment:       8 weeks  postpartum exam. WNL Pap smear not done at today's visit.     Plan:      1. Contraception: rhythm method  2. 8 weeks PP WNL  Breastfeeding without difficulty   3. Follow up in: 1 year for annual or as needed.   "

## 2018-04-11 ENCOUNTER — PATIENT MESSAGE (OUTPATIENT)
Dept: OBSTETRICS AND GYNECOLOGY | Facility: HOSPITAL | Age: 28
End: 2018-04-11

## 2018-04-20 ENCOUNTER — PATIENT MESSAGE (OUTPATIENT)
Dept: ENDOCRINOLOGY | Facility: CLINIC | Age: 28
End: 2018-04-20

## 2018-05-01 ENCOUNTER — LAB VISIT (OUTPATIENT)
Dept: LAB | Facility: HOSPITAL | Age: 28
End: 2018-05-01
Attending: INTERNAL MEDICINE
Payer: COMMERCIAL

## 2018-05-01 DIAGNOSIS — E03.9 HYPOTHYROIDISM, UNSPECIFIED TYPE: ICD-10-CM

## 2018-05-01 LAB
T3 SERPL-MCNC: 57 NG/DL
T4 FREE SERPL-MCNC: 1.27 NG/DL
TSH SERPL DL<=0.005 MIU/L-ACNC: <0.01 UIU/ML

## 2018-05-01 PROCEDURE — 84439 ASSAY OF FREE THYROXINE: CPT

## 2018-05-01 PROCEDURE — 84443 ASSAY THYROID STIM HORMONE: CPT

## 2018-05-01 PROCEDURE — 36415 COLL VENOUS BLD VENIPUNCTURE: CPT | Mod: PO

## 2018-05-01 PROCEDURE — 84480 ASSAY TRIIODOTHYRONINE (T3): CPT

## 2018-05-02 ENCOUNTER — TELEPHONE (OUTPATIENT)
Dept: ENDOCRINOLOGY | Facility: CLINIC | Age: 28
End: 2018-05-02

## 2018-05-02 ENCOUNTER — PATIENT MESSAGE (OUTPATIENT)
Dept: ENDOCRINOLOGY | Facility: CLINIC | Age: 28
End: 2018-05-02

## 2018-05-02 DIAGNOSIS — E03.9 HYPOTHYROIDISM, UNSPECIFIED TYPE: Primary | ICD-10-CM

## 2018-05-02 RX ORDER — LEVOTHYROXINE SODIUM 150 UG/1
150 TABLET ORAL DAILY
Qty: 30 TABLET | Refills: 11 | Status: SHIPPED | OUTPATIENT
Start: 2018-05-02 | End: 2018-05-28 | Stop reason: SDUPTHER

## 2018-05-02 NOTE — TELEPHONE ENCOUNTER
Can take both in AM. TSh Suppressed. Decrease synthroid to 150 mcg    Check TSH, Ft4, T3 in 6 weeks.

## 2018-05-02 NOTE — TELEPHONE ENCOUNTER
Please advise recent labs     Pt states -I've been forgetting to take my Cytomel at night which it maybe why my T3 is a little low. Can you find out if he'd be ok with me taking 2 in the morning? or is there a reason I need to take one in the am and one in the pm?

## 2018-05-03 ENCOUNTER — PATIENT MESSAGE (OUTPATIENT)
Dept: ENDOCRINOLOGY | Facility: CLINIC | Age: 28
End: 2018-05-03

## 2018-05-07 ENCOUNTER — PATIENT MESSAGE (OUTPATIENT)
Dept: ENDOCRINOLOGY | Facility: CLINIC | Age: 28
End: 2018-05-07

## 2018-05-08 RX ORDER — LIOTHYRONINE SODIUM 5 UG/1
TABLET ORAL
Qty: 60 TABLET | Refills: 2 | Status: SHIPPED | OUTPATIENT
Start: 2018-05-08 | End: 2018-08-08 | Stop reason: SDUPTHER

## 2018-05-19 ENCOUNTER — PATIENT MESSAGE (OUTPATIENT)
Dept: ENDOCRINOLOGY | Facility: CLINIC | Age: 28
End: 2018-05-19

## 2018-05-19 DIAGNOSIS — E03.9 HYPOTHYROIDISM, UNSPECIFIED TYPE: Primary | ICD-10-CM

## 2018-05-22 ENCOUNTER — TELEPHONE (OUTPATIENT)
Dept: ENDOCRINOLOGY | Facility: CLINIC | Age: 28
End: 2018-05-22

## 2018-05-22 NOTE — TELEPHONE ENCOUNTER
Let me repeat TSH, Ft4, Total T3. It is a little early but can see where levels are. Is she breast feeding

## 2018-05-23 ENCOUNTER — LAB VISIT (OUTPATIENT)
Dept: LAB | Facility: HOSPITAL | Age: 28
End: 2018-05-23
Attending: INTERNAL MEDICINE
Payer: COMMERCIAL

## 2018-05-23 DIAGNOSIS — E03.9 HYPOTHYROIDISM, UNSPECIFIED TYPE: ICD-10-CM

## 2018-05-23 LAB
T3 SERPL-MCNC: 50 NG/DL
T4 FREE SERPL-MCNC: 1.38 NG/DL
TSH SERPL DL<=0.005 MIU/L-ACNC: <0.01 UIU/ML

## 2018-05-23 PROCEDURE — 84480 ASSAY TRIIODOTHYRONINE (T3): CPT

## 2018-05-23 PROCEDURE — 84439 ASSAY OF FREE THYROXINE: CPT

## 2018-05-23 PROCEDURE — 36415 COLL VENOUS BLD VENIPUNCTURE: CPT | Mod: PO

## 2018-05-23 PROCEDURE — 84443 ASSAY THYROID STIM HORMONE: CPT

## 2018-05-24 ENCOUNTER — PATIENT MESSAGE (OUTPATIENT)
Dept: ENDOCRINOLOGY | Facility: CLINIC | Age: 28
End: 2018-05-24

## 2018-05-24 NOTE — TELEPHONE ENCOUNTER
SPoke with patient. Discussed that TSH undetectably suppressed. Not ideal to have TSH that suppressed. With thyroid cancer will TSH at the lower end of normal. Recommended decrease in synthroid and could increase T3 slightly.

## 2018-05-24 NOTE — TELEPHONE ENCOUNTER
TSH still suppressed suggestive of too much thyroid hormone.   We can increase the T3 and decrease synthroid to 137. Maybe on too much T4. Can you verify her T3 dose  Can you verify that she is not taking biotin, hair/skin supplements, iodine, or OTC thyroid supplements.

## 2018-05-25 ENCOUNTER — PATIENT MESSAGE (OUTPATIENT)
Dept: ENDOCRINOLOGY | Facility: CLINIC | Age: 28
End: 2018-05-25

## 2018-05-28 RX ORDER — LEVOTHYROXINE SODIUM 175 UG/1
175 TABLET ORAL DAILY
Qty: 30 TABLET | Refills: 11 | Status: SHIPPED | OUTPATIENT
Start: 2018-05-28 | End: 2018-07-27 | Stop reason: SDUPTHER

## 2018-05-29 ENCOUNTER — PATIENT MESSAGE (OUTPATIENT)
Dept: ENDOCRINOLOGY | Facility: CLINIC | Age: 28
End: 2018-05-29

## 2018-05-30 ENCOUNTER — PATIENT MESSAGE (OUTPATIENT)
Dept: ENDOCRINOLOGY | Facility: CLINIC | Age: 28
End: 2018-05-30

## 2018-05-30 ENCOUNTER — TELEPHONE (OUTPATIENT)
Dept: ENDOCRINOLOGY | Facility: CLINIC | Age: 28
End: 2018-05-30

## 2018-05-30 NOTE — TELEPHONE ENCOUNTER
I sent the Rx in a few days ago. DId the pharmacy not get it? It looks like it went through on my end

## 2018-06-01 ENCOUNTER — PATIENT MESSAGE (OUTPATIENT)
Dept: OBSTETRICS AND GYNECOLOGY | Facility: CLINIC | Age: 28
End: 2018-06-01

## 2018-06-06 NOTE — TELEPHONE ENCOUNTER
Pt reports has been breast feeding her 4mo old, and also pumping with basic electric pump at home. Decreased supply.  Has had follow up consult with lactation and they recommend hospital grade electric pump. Order placed. Pt will come by office to pick it up

## 2018-06-15 ENCOUNTER — HOSPITAL ENCOUNTER (OUTPATIENT)
Dept: RADIOLOGY | Facility: OTHER | Age: 28
Discharge: HOME | End: 2018-06-15
Attending: INTERNAL MEDICINE
Payer: COMMERCIAL

## 2018-06-15 DIAGNOSIS — C73 THYROID CANCER: ICD-10-CM

## 2018-06-15 PROCEDURE — 76536 US EXAM OF HEAD AND NECK: CPT | Mod: 26,,, | Performed by: RADIOLOGY

## 2018-06-15 PROCEDURE — 76536 US EXAM OF HEAD AND NECK: CPT | Mod: TC

## 2018-06-28 ENCOUNTER — OFFICE VISIT (OUTPATIENT)
Dept: ENDOCRINOLOGY | Facility: CLINIC | Age: 28
End: 2018-06-28
Payer: COMMERCIAL

## 2018-06-28 VITALS
WEIGHT: 106.13 LBS | DIASTOLIC BLOOD PRESSURE: 60 MMHG | HEART RATE: 75 BPM | BODY MASS INDEX: 19.53 KG/M2 | HEIGHT: 62 IN | SYSTOLIC BLOOD PRESSURE: 112 MMHG

## 2018-06-28 DIAGNOSIS — C73 THYROID CANCER: Primary | ICD-10-CM

## 2018-06-28 DIAGNOSIS — E03.9 HYPOTHYROIDISM, UNSPECIFIED TYPE: ICD-10-CM

## 2018-06-28 PROCEDURE — 99214 OFFICE O/P EST MOD 30 MIN: CPT | Mod: S$GLB,,, | Performed by: INTERNAL MEDICINE

## 2018-06-28 PROCEDURE — 3008F BODY MASS INDEX DOCD: CPT | Mod: CPTII,S$GLB,, | Performed by: INTERNAL MEDICINE

## 2018-06-28 PROCEDURE — 99999 PR PBB SHADOW E&M-EST. PATIENT-LVL III: CPT | Mod: PBBFAC,,, | Performed by: INTERNAL MEDICINE

## 2018-06-28 NOTE — PROGRESS NOTES
(OB- Dr. Orellana)  CHIEF COMPLAINT: Papillary Thyroid Cancer   28 year old being seen as a f/u. S/P total thyroidectomy 6/13. She received 100 mCi on 9/13 (She received tracer scan prior to determine dose). On synthroid 175 mcg and cytomel 5 mcg BID. No Palpitations.             FINAL PATHOLOGIC DIAGNOSIS   1. 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   2. THYROID (LEFT LOBE, CLINICAL): NO TUMOR SEEN.   THYROID CANCER CASE SUMMARY   Thyroid gland resection   Procedure: right lobectomy   Specimen integrity: separate right and left lobes   Specimen size: 6.0 cm   Tumor focality: single focus   Tumor laterality: right   Tumor size: 2.0 cm   Histologic type: papillary carcinoma, tall cell variant   Margins: not involved; closest margin < 0.1cm   Tumor capsule: totally encapsulated   Tumor capsular invasion: minimally invasive   Lymph-vascular invasion: not identified   Extrathyroid extension: not identified   Pathologic stage: I (pT1b NX)     PAST MEDICAL HISTORY/PAST SURGICAL HISTORY: Reviewed in [email or web address]     SOCIAL HISTORY: No T/A. .     FAMILY HISTORY: No thyroid cancer. Distant relative had hyperthyroidism.     MEDICATIONS/ALLERGIES: The patient's MedCard has been updated and reviewed.       ROS:   Constitutional: No fatigue.    Eyes: No recent visual changes   ENT: No dysphagia   Cardiovascular: Denies current anginal symptoms   Respiratory: Denies current respiratory difficulty   Gastrointestinal: No N/V   GenitoUrinary - No dysuria   Skin: No new skin rash   Neurologic: No focal neurologic complaints   Remainder ROS negative       PE:   GENERAL: Well developed, well nourished.   NECK: Supple, trachea midline, surgical scar intact. No LAD  CHEST: Resp even and unlabored, CTA bilateral.   CARDIAC: RRR, S1, S2 heard, no murmurs, rubs, S3, or S4      Results for CARLITA WORRELL WILFRED UDAY (MRN [id number]) as of 6/28/2018 14:43   Ref. Range 6/15/2018 11:58   TSH Latest Ref  Range: 0.400 - 4.000 uIU/mL <0.010 (L)   Free T4 Latest Ref Range: 0.71 - 1.51 ng/dL 1.35     Results for CARLITA WORRELL (MRN [id number]) as of 6/28/2018 14:43   Ref. Range 6/15/2018 11:58   TSH Latest Ref Range: 0.400 - 4.000 uIU/mL <0.010 (L)   T3, Total Latest Ref Range: 60 - 180 ng/dL 91   T3, Free Latest Ref Range: 2.3 - 4.2 pg/mL 3.1   Free T4 Latest Ref Range: 0.71 - 1.51 ng/dL 1.35   Thyroglobulin Interpretation Unknown SEE BELOW   Thyroglobulin Antibody Screen Latest Ref Range: <4.0 IU/mL 81 (H)   Thyroglobulin, Tumor Marker Latest Units: ng/mL <0.1       EXAMINATION:  US SOFT TISSUE HEAD NECK THYROID    CLINICAL HISTORY:  Malignant neoplasm of thyroid gland    TECHNIQUE:  Grayscale transverse and longitudinal imaging of the thyroid was performed with additional color flow evaluation.    COMPARISON:  04/10/2017 priors dating back to 08/24/2015 and PET-CT 10/13/2014    FINDINGS:  Absent visualization of thyroid tissue within the thyroid bed compatible with prior thyroidectomy.    No new or focal abnormal collection within the thyroid bed to suggest new lesion.    Subcentimeter lymph node right neck.    Stable echogenicity within the left thyroid bed stable and may correspond with calcifications seen on PET-CT.   Impression       Remote operative change from total thyroidectomy.  No evidence new collection to suggest residual thyroid tissue or recurrent nodule.    Stable echogenicity in the left thyroid bed which likely postoperative changes and artifactual from calcifications seen on CT.         ASSESSMENT/PLAN:   1. 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 TG Ab +. With minimal invasion of capsule but within margins. S/P 100 mCi. Post Tx WBS was normal. Her Tg Ab are elevated. 1 year WBS shows no iodine avid disease. AB were increased, so PET scan done 10/14 which was negative. TSH at the lower end of normal but no hyperthyroid symptoms. Discussed that we not want to keep TSH  undetectable suppressed. Since she had a reduction in lactation when we tried decreasing the dose and currently asymptomatic will keep current dose and reassess next time. Discussed risk of a suppressed TSH. Will monitor TG as it did slightly decrease      2. Hypothyroidism- See # 1. Monitor for hyperthyroid symptoms post pregnancy    FOLLOWUP:  F/U 4 months- TSH, Ft4, T3, Tg

## 2018-07-02 ENCOUNTER — PATIENT MESSAGE (OUTPATIENT)
Dept: ENDOCRINOLOGY | Facility: CLINIC | Age: 28
End: 2018-07-02

## 2018-07-25 ENCOUNTER — TELEPHONE (OUTPATIENT)
Dept: OBSTETRICS AND GYNECOLOGY | Facility: HOSPITAL | Age: 28
End: 2018-07-25

## 2018-07-25 NOTE — TELEPHONE ENCOUNTER
Returned pt's call.  She reports has been having pain with intercourse since birth of son 5 months ago. No pain if not engaging in intercourse. Has tried water based lubricant with no relief. She is still exclusively breast feeding (pumping milk for) her son.  Denies abnormal vaginal odor or discharge.  Desires to be seen in next few weeks for further evaluation.    Will have RN schedule.      ----- Message from Gloria Art RN sent at 7/25/2018 10:55 AM CDT -----  Contact: Pt      ----- Message -----  From: Brianda Gorman  Sent: 7/25/2018   9:58 AM  To: [facility] Staff    Name of Who is Calling: CARLITA WORRELL [[id number]]      What is the request in detail: Patient states she would like to speak with the saff in regards to vaginal pain she is experiencing during intercourse.... ..Please contact to further discuss and advise       Can the clinic reply by [facility]: No      What Number to Call Back if not in [facility]: [phone]

## 2018-07-26 ENCOUNTER — PATIENT MESSAGE (OUTPATIENT)
Dept: ENDOCRINOLOGY | Facility: CLINIC | Age: 28
End: 2018-07-26

## 2018-07-26 DIAGNOSIS — E03.9 HYPOTHYROIDISM, UNSPECIFIED TYPE: Primary | ICD-10-CM

## 2018-07-27 ENCOUNTER — PATIENT MESSAGE (OUTPATIENT)
Dept: ENDOCRINOLOGY | Facility: CLINIC | Age: 28
End: 2018-07-27

## 2018-07-27 RX ORDER — LEVOTHYROXINE SODIUM 175 UG/1
175 TABLET ORAL DAILY
Qty: 30 TABLET | Refills: 11 | Status: SHIPPED | OUTPATIENT
Start: 2018-07-27 | End: 2018-09-25

## 2018-07-27 NOTE — TELEPHONE ENCOUNTER
Have her do 175 mcg 6 days a week and 1/2 tablet on day 7. Can stay on cytomel for now but monitor if the palpitations are occurring after taking cytomel.   Check TSH, Ft4, T3 in 6 weeks in 6 weeks.

## 2018-08-08 RX ORDER — LIOTHYRONINE SODIUM 5 UG/1
TABLET ORAL
Qty: 60 TABLET | Refills: 1 | Status: SHIPPED | OUTPATIENT
Start: 2018-08-08 | End: 2018-10-09 | Stop reason: SDUPTHER

## 2018-08-16 ENCOUNTER — OFFICE VISIT (OUTPATIENT)
Dept: OBSTETRICS AND GYNECOLOGY | Facility: CLINIC | Age: 28
End: 2018-08-16
Payer: COMMERCIAL

## 2018-08-16 VITALS
SYSTOLIC BLOOD PRESSURE: 106 MMHG | BODY MASS INDEX: 19.47 KG/M2 | WEIGHT: 105.81 LBS | HEIGHT: 62 IN | DIASTOLIC BLOOD PRESSURE: 62 MMHG

## 2018-08-16 DIAGNOSIS — N94.2 VAGINISMUS: ICD-10-CM

## 2018-08-16 DIAGNOSIS — N94.10 DYSPAREUNIA IN FEMALE: ICD-10-CM

## 2018-08-16 DIAGNOSIS — R10.2 PELVIC PAIN: Primary | ICD-10-CM

## 2018-08-16 PROCEDURE — 3008F BODY MASS INDEX DOCD: CPT | Mod: CPTII,S$GLB,, | Performed by: ADVANCED PRACTICE MIDWIFE

## 2018-08-16 PROCEDURE — 99999 PR PBB SHADOW E&M-EST. PATIENT-LVL III: CPT | Mod: PBBFAC,,, | Performed by: ADVANCED PRACTICE MIDWIFE

## 2018-08-16 PROCEDURE — 99213 OFFICE O/P EST LOW 20 MIN: CPT | Mod: S$GLB,,, | Performed by: ADVANCED PRACTICE MIDWIFE

## 2018-08-16 RX ORDER — ESTRADIOL 0.1 MG/G
1 CREAM VAGINAL
Qty: 42.5 G | Refills: 1 | Status: SHIPPED | OUTPATIENT
Start: 2018-08-16 | End: 2019-06-28

## 2018-08-17 NOTE — PROGRESS NOTES
Idalia Lee is a 28 y.o.  who presents today for GYN problem visit.     C/C: dyspareunia     HPI: She is currently sexually active and uses NFP/AGUILAR for contraception. + dyspareunia since delivery ( 2018 with bilateral sulcus lacerations, vaginal packing, and PPH). Declines STD testing; in long-term monogamous relationship with same partner, . Reports initial discomfort with insertion but then is able to permit penetration however feels a tightening in the inside and pain with movement during intercourse. Never had pain with intercourse prior to delivery. Wants to be sexually intimate with her , happy in the relationship and wants to be able to have sex without pain.     MENSTRUAL HISTORY  No LMP recorded.. No menses since delivery; breastfeeding.     PAP HISTORY: last pap , result negative    SOCIAL HISTORY: Denies emotional/mental/physical/sexual violence or abuse. Feels safe at home.    Review of patient's allergies indicates:   Allergen Reactions    E-mycin [erythromycin] Nausea And Vomiting    Sulfa (sulfonamide antibiotics) Hives    Ceclor [cefaclor] Rash     Past Medical History:   Diagnosis Date    Anemia     during pregnancy    Anxiousness     Cancer     papillary thyroid    Headache(784.0)     Infertility, female     thyroid related after surgery    Laceration of bilateral vaginal wall or sulcus during delivery 2018    Mental disorder     depression, eating disorder at 17  or 18, therapy    Otitis media     PONV (postoperative nausea and vomiting)     Post-surgical hypothyroidism 2013    Sinusitis      Past Surgical History:   Procedure Laterality Date    MOUTH SURGERY      extraction of [place] teeth    THYROID SURGERY      TOTAL THYROIDECTOMY      6/10/13     Past Surgical History:   Procedure Laterality Date    MOUTH SURGERY      extraction of [place] teeth    THYROID SURGERY      TOTAL THYROIDECTOMY      6/10/13     OB History     Para Term  AB Living   1 1 1     1   SAB TAB Ectopic Multiple Live Births         0 1      # Outcome Date GA Lbr Nj/2nd Weight Sex Delivery Anes PTL Lv   1 Term 18 41w0d  3.374 kg (7 lb 7 oz) M Vag-Spont EPI N KURT        OB History      Para Term  AB Living    1 1 1     1    SAB TAB Ectopic Multiple Live Births          0 1        Social History     Socioeconomic History    Marital status:      Spouse name: Not on file    Number of children: Not on file    Years of education: Not on file    Highest education level: Not on file   Social Needs    Financial resource strain: Not on file    Food insecurity - worry: Not on file    Food insecurity - inability: Not on file    Transportation needs - medical: Not on file    Transportation needs - non-medical: Not on file   Occupational History    Not on file   Tobacco Use    Smoking status: Never Smoker    Smokeless tobacco: Never Used   Substance and Sexual Activity    Alcohol use: No    Drug use: No    Sexual activity: Yes     Partners: Male     Birth control/protection: None   Other Topics Concern    Not on file   Social History Narrative     Britton    First baby for both    Live in [place]    Britton is an , 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      Family History   Problem Relation Age of Onset    Alzheimer's disease Paternal Grandmother     Breast cancer Neg Hx     Colon cancer Neg Hx     Ovarian cancer Neg Hx      Social History     Substance and Sexual Activity   Sexual Activity Yes    Partners: Male    Birth control/protection: None       Primary Doctor No     ROS  Gen: Negative--fever, weight change, fatigue, appetite change  Skin:  Negative--Hirsutism, acne, rash  GI:  Negative--Nausea, vomiting, diarrhea, constipation, abdominal pain  :  Negative--Dysuria, vaginal discharge, genital sores, dyspareunia  Breast: Negative--Mass, lump, nipple discharge, tenderness    OBJECTIVE:  /62   Ht 5'  "2" (1.575 m)   Wt 48 kg (105 lb 13.1 oz)   Breastfeeding? Yes   BMI 19.35 kg/m²   Constitutional/Gen: Constitutional/Gen: NAD, appears stated age, well groomed  Lung: normal resp effort  Heart: normal HR, RRR   External genitalia: no lesions or discharge, normal hair distribution, atrophic appearance  Urethral meatus: normal size and location, no lesions or prolapse  Vagina: normal appearance, no lesions, no discharge, no evidence cystocele or rectocele.  Cervix: normal appearance, no discharge, no lesions, negative CMT  Uterus: nontender, mobile, normal size, contour, and position. No pathologic descent.  Adnexa: no masses or tenderness  Bimanual exam: able to insert two fingers however felt immediate tightening/contraction of vaginal muscles/pelvic floor upon insertion approximately 1-2 inches within vagina; weak pelvic floor tone with kegel exercise during bimanual exam  Anus/Perineum: normal appearance, with no lesions or discharge. Internal exam deferred.  Neurologic: A&O x 4, non-focal, cranial nerves 2-12 grossly intact  Psych: affect appropriate and without signs of mood, thought or memory difficulty appreciated    A:    28 y.o. female  for GYN problem visit  Body mass index is 19.35 kg/m².  Patient Active Problem List   Diagnosis    Thyroid cancer    S/P thyroidectomy    Post-surgical hypothyroidism    S/P radioactive iodine thyroid ablation    Echogenic intracardiac focus of fetus on prenatal ultrasound    Mild anemia during pregnancy    Laceration of bilateral vaginal wall or sulcus during delivery    Vaginismus    Dyspareunia in female       P:  Dyspareunia/pelvic pain  -- Suspect vaginismus. Discussed with Idalia. TVUS ordered. Topical vaginal estrace 2x/week for atrophic (breastfeeding) component. Referral to pelvic floor PT. Encouraged kegels. Idalia will let me know if no improvement in sx within ~ 3 months and would consider referral to OBGYN prn.     --Discussed ASCCP pap " guidelines. Last pap 6/17 result negative. Next due 6/2020  --Continue annual exams, return then or sooner prn    Updated Medication List:  Current Outpatient Medications   Medication Sig Dispense Refill    Lactobacillus rhamnosus GG (CULTURELLE) 10 billion cell capsule Take 1 capsule by mouth once daily.      levothyroxine (SYNTHROID, LEVOTHROID) 175 MCG tablet Take 1 tablet (175 mcg total) by mouth once daily. 1 tablet 6 days a week and 1/2 tablet on day 7. Brandname 30 tablet 11    liothyronine (CYTOMEL) 5 MCG Tab TAKE ONE TABLET BY MOUTH TWICE DAILY  60 tablet 1    PRENATAL VIT/IRON FUM/FOLIC AC (PRENATAL 1+1 ORAL) Take by mouth.      estradiol (ESTRACE) 0.01 % (0.1 mg/gram) vaginal cream Place 1 g vaginally twice a week. 42.5 g 1     No current facility-administered medications for this visit.         Kim Song CNM/NP  8/16/2018 8:32 PM

## 2018-09-14 ENCOUNTER — LAB VISIT (OUTPATIENT)
Dept: LAB | Facility: HOSPITAL | Age: 28
End: 2018-09-14
Attending: INTERNAL MEDICINE
Payer: COMMERCIAL

## 2018-09-14 DIAGNOSIS — E03.9 HYPOTHYROIDISM, UNSPECIFIED TYPE: ICD-10-CM

## 2018-09-14 LAB
T3 SERPL-MCNC: 58 NG/DL
T4 FREE SERPL-MCNC: 1.26 NG/DL
TSH SERPL DL<=0.005 MIU/L-ACNC: <0.01 UIU/ML

## 2018-09-14 PROCEDURE — 84480 ASSAY TRIIODOTHYRONINE (T3): CPT

## 2018-09-14 PROCEDURE — 36415 COLL VENOUS BLD VENIPUNCTURE: CPT | Mod: PO

## 2018-09-14 PROCEDURE — 84443 ASSAY THYROID STIM HORMONE: CPT

## 2018-09-14 PROCEDURE — 84439 ASSAY OF FREE THYROXINE: CPT

## 2018-09-19 ENCOUNTER — PATIENT MESSAGE (OUTPATIENT)
Dept: ENDOCRINOLOGY | Facility: CLINIC | Age: 28
End: 2018-09-19

## 2018-09-24 ENCOUNTER — PATIENT MESSAGE (OUTPATIENT)
Dept: ENDOCRINOLOGY | Facility: CLINIC | Age: 28
End: 2018-09-24

## 2018-09-25 RX ORDER — LEVOTHYROXINE SODIUM 175 UG/1
TABLET ORAL
Qty: 30 TABLET | Refills: 11
Start: 2018-09-25 | End: 2018-11-16

## 2018-09-26 ENCOUNTER — PATIENT MESSAGE (OUTPATIENT)
Dept: ENDOCRINOLOGY | Facility: CLINIC | Age: 28
End: 2018-09-26

## 2018-10-09 RX ORDER — LIOTHYRONINE SODIUM 5 UG/1
TABLET ORAL
Qty: 60 TABLET | Refills: 10 | Status: SHIPPED | OUTPATIENT
Start: 2018-10-09 | End: 2019-10-04 | Stop reason: SDUPTHER

## 2018-11-05 ENCOUNTER — LAB VISIT (OUTPATIENT)
Dept: LAB | Facility: HOSPITAL | Age: 28
End: 2018-11-05
Attending: INTERNAL MEDICINE
Payer: COMMERCIAL

## 2018-11-05 DIAGNOSIS — E03.9 HYPOTHYROIDISM, UNSPECIFIED TYPE: ICD-10-CM

## 2018-11-05 DIAGNOSIS — C73 THYROID CANCER: ICD-10-CM

## 2018-11-05 LAB
T3FREE SERPL-MCNC: 2.6 PG/ML
T4 FREE SERPL-MCNC: 1.14 NG/DL
TSH SERPL DL<=0.005 MIU/L-ACNC: <0.01 UIU/ML

## 2018-11-05 PROCEDURE — 84439 ASSAY OF FREE THYROXINE: CPT

## 2018-11-05 PROCEDURE — 84481 FREE ASSAY (FT-3): CPT

## 2018-11-05 PROCEDURE — 36415 COLL VENOUS BLD VENIPUNCTURE: CPT | Mod: PO

## 2018-11-05 PROCEDURE — 84443 ASSAY THYROID STIM HORMONE: CPT

## 2018-11-05 PROCEDURE — 84432 ASSAY OF THYROGLOBULIN: CPT

## 2018-11-07 LAB
THRYOGLOBULIN INTERPRETATION: ABNORMAL
THYROGLOB AB SERPL-ACNC: 72 IU/ML
THYROGLOB SERPL-MCNC: <0.1 NG/ML

## 2018-11-12 ENCOUNTER — PATIENT MESSAGE (OUTPATIENT)
Dept: ENDOCRINOLOGY | Facility: CLINIC | Age: 28
End: 2018-11-12

## 2018-11-12 ENCOUNTER — TELEPHONE (OUTPATIENT)
Dept: ENDOCRINOLOGY | Facility: CLINIC | Age: 28
End: 2018-11-12

## 2018-11-12 NOTE — TELEPHONE ENCOUNTER
----- Message from Renée Garcia sent at 11/12/2018  7:02 AM CST -----   Type:  Same Day Appointment Request    Caller is requesting a same day appointment.  Caller declined first available appointment listed below.      Name of Caller:  pt  When is the first available appointment?   Follow up  Symptoms:    Follow up  Best Call Back Number:[phone]  Additional Information:     Pt  Is  On  The  Ns  And  Would  Like to be seen today

## 2018-11-16 ENCOUNTER — PATIENT MESSAGE (OUTPATIENT)
Dept: ENDOCRINOLOGY | Facility: CLINIC | Age: 28
End: 2018-11-16

## 2018-11-16 ENCOUNTER — TELEPHONE (OUTPATIENT)
Dept: ENDOCRINOLOGY | Facility: CLINIC | Age: 28
End: 2018-11-16

## 2018-11-16 DIAGNOSIS — E03.9 HYPOTHYROIDISM, UNSPECIFIED TYPE: Primary | ICD-10-CM

## 2018-11-16 RX ORDER — LEVOTHYROXINE SODIUM 137 UG/1
137 TABLET ORAL
Qty: 30 TABLET | Refills: 11 | Status: SHIPPED | OUTPATIENT
Start: 2018-11-16 | End: 2019-08-27

## 2018-11-16 NOTE — TELEPHONE ENCOUNTER
Spoke with patient. Will decrease synthroid 175 mcg 5 days a week and 1/2 tablet 2 days (equivalent to 150 mcg) a week to:    Synthroid 137 mcg once daily. Check TSH, Ft4, t3 in 8 weeks

## 2019-01-18 ENCOUNTER — LAB VISIT (OUTPATIENT)
Dept: LAB | Facility: HOSPITAL | Age: 29
End: 2019-01-18
Attending: INTERNAL MEDICINE
Payer: COMMERCIAL

## 2019-01-18 DIAGNOSIS — E03.9 HYPOTHYROIDISM, UNSPECIFIED TYPE: ICD-10-CM

## 2019-01-18 PROCEDURE — 84443 ASSAY THYROID STIM HORMONE: CPT

## 2019-01-18 PROCEDURE — 36415 COLL VENOUS BLD VENIPUNCTURE: CPT | Mod: PO

## 2019-01-18 PROCEDURE — 84439 ASSAY OF FREE THYROXINE: CPT

## 2019-01-18 PROCEDURE — 84480 ASSAY TRIIODOTHYRONINE (T3): CPT

## 2019-01-19 LAB
T3 SERPL-MCNC: 49 NG/DL
T4 FREE SERPL-MCNC: 1 NG/DL
TSH SERPL DL<=0.005 MIU/L-ACNC: 0.07 UIU/ML

## 2019-01-20 ENCOUNTER — PATIENT MESSAGE (OUTPATIENT)
Dept: ENDOCRINOLOGY | Facility: CLINIC | Age: 29
End: 2019-01-20

## 2019-02-11 ENCOUNTER — PATIENT MESSAGE (OUTPATIENT)
Dept: OBSTETRICS AND GYNECOLOGY | Facility: CLINIC | Age: 29
End: 2019-02-11

## 2019-05-04 ENCOUNTER — PATIENT MESSAGE (OUTPATIENT)
Dept: ENDOCRINOLOGY | Facility: CLINIC | Age: 29
End: 2019-05-04

## 2019-05-04 DIAGNOSIS — C73 THYROID CANCER: Primary | ICD-10-CM

## 2019-05-06 ENCOUNTER — PATIENT MESSAGE (OUTPATIENT)
Dept: ENDOCRINOLOGY | Facility: CLINIC | Age: 29
End: 2019-05-06

## 2019-05-07 ENCOUNTER — PATIENT MESSAGE (OUTPATIENT)
Dept: ENDOCRINOLOGY | Facility: CLINIC | Age: 29
End: 2019-05-07

## 2019-05-30 ENCOUNTER — PATIENT MESSAGE (OUTPATIENT)
Dept: ENDOCRINOLOGY | Facility: CLINIC | Age: 29
End: 2019-05-30

## 2019-06-04 ENCOUNTER — PATIENT MESSAGE (OUTPATIENT)
Dept: ENDOCRINOLOGY | Facility: CLINIC | Age: 29
End: 2019-06-04

## 2019-06-21 ENCOUNTER — HOSPITAL ENCOUNTER (OUTPATIENT)
Dept: RADIOLOGY | Facility: OTHER | Age: 29
Discharge: HOME | End: 2019-06-21
Attending: INTERNAL MEDICINE
Payer: COMMERCIAL

## 2019-06-21 DIAGNOSIS — C73 THYROID CANCER: ICD-10-CM

## 2019-06-21 PROCEDURE — 76536 US SOFT TISSUE HEAD NECK THYROID: ICD-10-PCS | Mod: 26,,, | Performed by: RADIOLOGY

## 2019-06-21 PROCEDURE — 76536 US EXAM OF HEAD AND NECK: CPT | Mod: 26,,, | Performed by: RADIOLOGY

## 2019-06-21 PROCEDURE — 76536 US EXAM OF HEAD AND NECK: CPT | Mod: TC

## 2019-06-28 ENCOUNTER — OFFICE VISIT (OUTPATIENT)
Dept: ENDOCRINOLOGY | Facility: CLINIC | Age: 29
End: 2019-06-28
Payer: COMMERCIAL

## 2019-06-28 VITALS
HEIGHT: 62 IN | DIASTOLIC BLOOD PRESSURE: 60 MMHG | HEART RATE: 71 BPM | WEIGHT: 112 LBS | SYSTOLIC BLOOD PRESSURE: 92 MMHG | BODY MASS INDEX: 20.61 KG/M2

## 2019-06-28 DIAGNOSIS — C73 THYROID CANCER: Primary | ICD-10-CM

## 2019-06-28 DIAGNOSIS — E03.9 HYPOTHYROIDISM, UNSPECIFIED TYPE: ICD-10-CM

## 2019-06-28 PROCEDURE — 99999 PR PBB SHADOW E&M-EST. PATIENT-LVL III: ICD-10-PCS | Mod: PBBFAC,,, | Performed by: INTERNAL MEDICINE

## 2019-06-28 PROCEDURE — 3008F PR BODY MASS INDEX (BMI) DOCUMENTED: ICD-10-PCS | Mod: CPTII,S$GLB,, | Performed by: INTERNAL MEDICINE

## 2019-06-28 PROCEDURE — 3008F BODY MASS INDEX DOCD: CPT | Mod: CPTII,S$GLB,, | Performed by: INTERNAL MEDICINE

## 2019-06-28 PROCEDURE — 99999 PR PBB SHADOW E&M-EST. PATIENT-LVL III: CPT | Mod: PBBFAC,,, | Performed by: INTERNAL MEDICINE

## 2019-06-28 PROCEDURE — 99213 PR OFFICE/OUTPT VISIT, EST, LEVL III, 20-29 MIN: ICD-10-PCS | Mod: S$GLB,,, | Performed by: INTERNAL MEDICINE

## 2019-06-28 PROCEDURE — 99213 OFFICE O/P EST LOW 20 MIN: CPT | Mod: S$GLB,,, | Performed by: INTERNAL MEDICINE

## 2019-06-28 RX ORDER — MAGNESIUM 250 MG
1000 TABLET ORAL DAILY
COMMUNITY
End: 2023-03-24

## 2019-06-28 NOTE — PROGRESS NOTES
(OB- Dr. Orellana)  CHIEF COMPLAINT: Papillary Thyroid Cancer   29 year old being seen as a f/u. S/P total thyroidectomy 6/13. She received 100 mCi on 9/13 (She received tracer scan prior to determine dose). On synthroid 137 mcg and cytomel 5 mcg BID. States has been feeling hot. No increased anxiety.       FINAL PATHOLOGIC DIAGNOSIS   1. 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   2. THYROID (LEFT LOBE, CLINICAL): NO TUMOR SEEN.   THYROID CANCER CASE SUMMARY   Thyroid gland resection   Procedure: right lobectomy   Specimen integrity: separate right and left lobes   Specimen size: 6.0 cm   Tumor focality: single focus   Tumor laterality: right   Tumor size: 2.0 cm   Histologic type: papillary carcinoma, tall cell variant   Margins: not involved; closest margin < 0.1cm   Tumor capsule: totally encapsulated   Tumor capsular invasion: minimally invasive   Lymph-vascular invasion: not identified   Extrathyroid extension: not identified   Pathologic stage: I (pT1b NX)     PAST MEDICAL HISTORY/PAST SURGICAL HISTORY: Reviewed in [facility]     SOCIAL HISTORY: No T/A. .     FAMILY HISTORY: No thyroid cancer. Distant relative had hyperthyroidism.     MEDICATIONS/ALLERGIES: The patient's MedCard has been updated and reviewed.       ROS:   Constitutional: No fatigue.    Eyes: No recent visual changes   ENT: No dysphagia   Cardiovascular: Denies current anginal symptoms   Respiratory: Denies current respiratory difficulty   Gastrointestinal: No N/V   GenitoUrinary - No dysuria   Skin: No new skin rash   Neurologic: No focal neurologic complaints   Remainder ROS negative       PE:   GENERAL: Well developed, well nourished.   NECK: Supple, trachea midline, surgical scar intact. No LAD  CHEST: Resp even and unlabored, CTA bilateral.   CARDIAC: RRR, S1, S2 heard, no murmurs, rubs, S3, or S4      Results for CARLITA WORRELL WILFRED UDAY (MRN [id number]) as of 6/28/2019 14:44   Ref. Range  6/21/2019 12:15   TSH Latest Ref Range: 0.400 - 4.000 uIU/mL <0.010 (L)   T3, Total Latest Ref Range: 60 - 180 ng/dL 120   Free T4 Latest Ref Range: 0.71 - 1.51 ng/dL 1.13         ASSESSMENT/PLAN:   1. 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 TG Ab +. With minimal invasion of capsule but within margins. S/P 100 mCi. Post Tx WBS was normal. Her Tg Ab are elevated. 1 year WBS shows no iodine avid disease. AB were increased, so PET scan done 10/14 which was negative. No change in dose but the TSH suppressed further and FT3 increased. She is taking a supplement by DR. Sharp, but unsure if impacting TFT. Will have her hold and repeat in 6 weeks. I did discuss if planning future pregnancies, would need to stop T3 and use LT4 alone.     2. Hypothyroidism- See # 1. Monitor for hyperthyroid symptoms    FOLLOWUP:  6 weeks- TSH, Ft4, T3  F/U 6 months- TSH, Ft4, T3, Tg

## 2019-07-01 ENCOUNTER — PATIENT MESSAGE (OUTPATIENT)
Dept: ENDOCRINOLOGY | Facility: CLINIC | Age: 29
End: 2019-07-01

## 2019-07-01 ENCOUNTER — TELEPHONE (OUTPATIENT)
Dept: ENDOCRINOLOGY | Facility: CLINIC | Age: 29
End: 2019-07-01

## 2019-07-02 ENCOUNTER — PATIENT MESSAGE (OUTPATIENT)
Dept: ENDOCRINOLOGY | Facility: CLINIC | Age: 29
End: 2019-07-02

## 2019-07-02 NOTE — TELEPHONE ENCOUNTER
You have an US order in but your note just says labs prior to next appt.  Do you want US in 6 mos?

## 2019-08-09 ENCOUNTER — LAB VISIT (OUTPATIENT)
Dept: LAB | Facility: HOSPITAL | Age: 29
End: 2019-08-09
Attending: INTERNAL MEDICINE
Payer: COMMERCIAL

## 2019-08-09 DIAGNOSIS — E03.9 HYPOTHYROIDISM, UNSPECIFIED TYPE: ICD-10-CM

## 2019-08-09 DIAGNOSIS — C73 THYROID CANCER: ICD-10-CM

## 2019-08-09 LAB
T3 SERPL-MCNC: 63 NG/DL (ref 60–180)
T4 FREE SERPL-MCNC: 1.02 NG/DL (ref 0.71–1.51)
TSH SERPL DL<=0.005 MIU/L-ACNC: <0.01 UIU/ML (ref 0.4–4)

## 2019-08-09 PROCEDURE — 84439 ASSAY OF FREE THYROXINE: CPT

## 2019-08-09 PROCEDURE — 36415 COLL VENOUS BLD VENIPUNCTURE: CPT | Mod: PO

## 2019-08-09 PROCEDURE — 84480 ASSAY TRIIODOTHYRONINE (T3): CPT

## 2019-08-09 PROCEDURE — 84443 ASSAY THYROID STIM HORMONE: CPT

## 2019-08-19 ENCOUNTER — PATIENT MESSAGE (OUTPATIENT)
Dept: ENDOCRINOLOGY | Facility: CLINIC | Age: 29
End: 2019-08-19

## 2019-08-19 ENCOUNTER — TELEPHONE (OUTPATIENT)
Dept: ENDOCRINOLOGY | Facility: CLINIC | Age: 29
End: 2019-08-19

## 2019-08-19 DIAGNOSIS — E03.9 HYPOTHYROIDISM, UNSPECIFIED TYPE: Primary | ICD-10-CM

## 2019-08-27 ENCOUNTER — PATIENT MESSAGE (OUTPATIENT)
Dept: ENDOCRINOLOGY | Facility: CLINIC | Age: 29
End: 2019-08-27

## 2019-08-27 RX ORDER — LEVOTHYROXINE SODIUM 137 UG/1
TABLET ORAL
Qty: 30 TABLET | Refills: 11 | Status: SHIPPED | OUTPATIENT
Start: 2019-08-27 | End: 2019-10-18

## 2019-08-27 NOTE — TELEPHONE ENCOUNTER
Since having difficulty losing weight, would like to do a slight change in synthroid.   Can stay on cytomel. Decrease synthroid from 137 mcg to 137 mcg 6 days a week and 1/2 tablet on day 7    Check TSH, Ft4, T3 in 6 weeks

## 2019-08-28 ENCOUNTER — PATIENT MESSAGE (OUTPATIENT)
Dept: ENDOCRINOLOGY | Facility: CLINIC | Age: 29
End: 2019-08-28

## 2019-09-08 ENCOUNTER — PATIENT MESSAGE (OUTPATIENT)
Dept: ENDOCRINOLOGY | Facility: CLINIC | Age: 29
End: 2019-09-08

## 2019-09-11 ENCOUNTER — OFFICE VISIT (OUTPATIENT)
Dept: NEUROLOGY | Facility: CLINIC | Age: 29
End: 2019-09-11
Payer: COMMERCIAL

## 2019-09-11 VITALS
WEIGHT: 108 LBS | SYSTOLIC BLOOD PRESSURE: 98 MMHG | HEIGHT: 62 IN | BODY MASS INDEX: 19.88 KG/M2 | HEART RATE: 62 BPM | DIASTOLIC BLOOD PRESSURE: 63 MMHG

## 2019-09-11 DIAGNOSIS — G43.009 MIGRAINE WITHOUT AURA AND WITHOUT STATUS MIGRAINOSUS, NOT INTRACTABLE: Primary | ICD-10-CM

## 2019-09-11 PROCEDURE — 99203 PR OFFICE/OUTPT VISIT, NEW, LEVL III, 30-44 MIN: ICD-10-PCS | Mod: S$GLB,,, | Performed by: NURSE PRACTITIONER

## 2019-09-11 PROCEDURE — 99999 PR PBB SHADOW E&M-EST. PATIENT-LVL III: CPT | Mod: PBBFAC,,, | Performed by: NURSE PRACTITIONER

## 2019-09-11 PROCEDURE — 99203 OFFICE O/P NEW LOW 30 MIN: CPT | Mod: S$GLB,,, | Performed by: NURSE PRACTITIONER

## 2019-09-11 PROCEDURE — 3008F PR BODY MASS INDEX (BMI) DOCUMENTED: ICD-10-PCS | Mod: CPTII,S$GLB,, | Performed by: NURSE PRACTITIONER

## 2019-09-11 PROCEDURE — 99999 PR PBB SHADOW E&M-EST. PATIENT-LVL III: ICD-10-PCS | Mod: PBBFAC,,, | Performed by: NURSE PRACTITIONER

## 2019-09-11 PROCEDURE — 3008F BODY MASS INDEX DOCD: CPT | Mod: CPTII,S$GLB,, | Performed by: NURSE PRACTITIONER

## 2019-09-11 RX ORDER — SUMATRIPTAN SUCCINATE 100 MG/1
100 TABLET ORAL
Qty: 9 TABLET | Refills: 3 | Status: SHIPPED | OUTPATIENT
Start: 2019-09-11 | End: 2020-07-01 | Stop reason: SDUPTHER

## 2019-09-11 NOTE — PROGRESS NOTES
"REFERRING PROVIDER: Self referred    REASON FOR CONSULT: Headaches     29/F with chronic headaches since late teens. Pain describes as dull ache always been R occipital area radiating forward to behind her OD with dizziness, nausea, neck tightness and photo/phonophobia. Sometimes left side but this will not be as intense and 2 advil can improve this one.  No consistent triggers other than stress/let down . She denies acute neurological or unilateral autonomic deficit, aura or prodromal symptoms. Activity can worsen pain, rest improve. Occurs 1x/month lasting 2-3d. Excedrin migraine helps lessen intensity. Cold/heat/rest/massage/basil aromatherapy can be helpful. Has to have someone care for her baby at times when she has a migraine. Hard to get anything done around house due to fatigue. Taking 1000mg Mag qam as preventive overall.     FH: mom headaches which resolved after menopause  SH: . 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 Sleeping well. Otherwise a balance review of 10-systems is negative.       PHYSICAL EXAM:   General: W/D, W/N, well groomed  BP 98/63 (BP Location: Left arm, Patient Position: Sitting, BP Method: Medium (Automatic))   Pulse 62   Ht 5' 2" (1.575 m)   Wt 49 kg (108 lb 0.4 oz)   BMI 19.76 kg/m²   Neurological: Awake, alert, oriented x 3. Speech fluent, no dysarthria. Good attention span. Good fund of knowledge.   CN II-XII- pupils equal, no ptosis, nystagmus, EOM palsy. No facial asymmetry. Good hearing bilaterally. Good shoulder shrug  Gait: Normal     IMPRESSION:   Chronic migraines w/o aura, non-intractable      PLAN   Defer as they are infrequent  Continue 1G Mag qd    Abortive therapy:   Johanna chew/alcohol smell prn  Imitrex 100mg 1/2-1 tab +- 2 Ibuprofen, max 200mg/24hr    Given long standing history of headaches with no change in character and no associated neurological symptoms, no reportable neurological symptoms in between episodes, and " normal neuro exam today there is no indication for an MRI.      RTC 6 month, sooner if needed

## 2019-10-04 RX ORDER — LIOTHYRONINE SODIUM 5 UG/1
TABLET ORAL
Qty: 60 TABLET | Refills: 9 | Status: SHIPPED | OUTPATIENT
Start: 2019-10-04 | End: 2019-12-09 | Stop reason: SDUPTHER

## 2019-10-11 ENCOUNTER — LAB VISIT (OUTPATIENT)
Dept: LAB | Facility: HOSPITAL | Age: 29
End: 2019-10-11
Attending: INTERNAL MEDICINE
Payer: COMMERCIAL

## 2019-10-11 DIAGNOSIS — E03.9 HYPOTHYROIDISM, UNSPECIFIED TYPE: ICD-10-CM

## 2019-10-11 LAB
T3 SERPL-MCNC: 108 NG/DL (ref 60–180)
T4 FREE SERPL-MCNC: 1.14 NG/DL (ref 0.71–1.51)
TSH SERPL DL<=0.005 MIU/L-ACNC: <0.01 UIU/ML (ref 0.4–4)

## 2019-10-11 PROCEDURE — 84443 ASSAY THYROID STIM HORMONE: CPT

## 2019-10-11 PROCEDURE — 84439 ASSAY OF FREE THYROXINE: CPT

## 2019-10-11 PROCEDURE — 36415 COLL VENOUS BLD VENIPUNCTURE: CPT | Mod: PO

## 2019-10-11 PROCEDURE — 84480 ASSAY TRIIODOTHYRONINE (T3): CPT

## 2019-10-16 ENCOUNTER — TELEPHONE (OUTPATIENT)
Dept: ENDOCRINOLOGY | Facility: CLINIC | Age: 29
End: 2019-10-16

## 2019-10-16 NOTE — TELEPHONE ENCOUNTER
TSH still suppressed. Can you verify not taking any supplements. Want to make sure before decreasing synthroid to 125 mcg

## 2019-10-17 ENCOUNTER — PATIENT MESSAGE (OUTPATIENT)
Dept: ENDOCRINOLOGY | Facility: CLINIC | Age: 29
End: 2019-10-17

## 2019-10-17 DIAGNOSIS — E03.9 HYPOTHYROIDISM, UNSPECIFIED TYPE: Primary | ICD-10-CM

## 2019-10-18 RX ORDER — LEVOTHYROXINE SODIUM 125 UG/1
125 TABLET ORAL DAILY
Qty: 30 TABLET | Refills: 11 | Status: SHIPPED | OUTPATIENT
Start: 2019-10-18 | End: 2019-12-09

## 2019-10-18 NOTE — TELEPHONE ENCOUNTER
Have her decrease synthroid from 137 mcg to 125 mcg daily. Stay on cytomel.     Check TSH, Ft4, T3 in 6 weeks

## 2019-11-10 ENCOUNTER — PATIENT MESSAGE (OUTPATIENT)
Dept: NEUROLOGY | Facility: CLINIC | Age: 29
End: 2019-11-10

## 2019-11-12 ENCOUNTER — TELEPHONE (OUTPATIENT)
Dept: SLEEP MEDICINE | Facility: CLINIC | Age: 29
End: 2019-11-12

## 2019-11-12 DIAGNOSIS — G51.4 FACIAL TWITCHING: Primary | ICD-10-CM

## 2019-11-12 DIAGNOSIS — R29.818 FOCAL NEUROLOGICAL DEFICIT: ICD-10-CM

## 2019-11-12 DIAGNOSIS — R51.9 HEADACHE, UNSPECIFIED HEADACHE TYPE: ICD-10-CM

## 2019-11-22 ENCOUNTER — TELEPHONE (OUTPATIENT)
Dept: SLEEP MEDICINE | Facility: CLINIC | Age: 29
End: 2019-11-22

## 2019-11-27 ENCOUNTER — PATIENT MESSAGE (OUTPATIENT)
Dept: NEUROLOGY | Facility: CLINIC | Age: 29
End: 2019-11-27

## 2019-12-03 ENCOUNTER — TELEPHONE (OUTPATIENT)
Dept: SLEEP MEDICINE | Facility: CLINIC | Age: 29
End: 2019-12-03

## 2019-12-06 ENCOUNTER — LAB VISIT (OUTPATIENT)
Dept: LAB | Facility: HOSPITAL | Age: 29
End: 2019-12-06
Attending: INTERNAL MEDICINE
Payer: COMMERCIAL

## 2019-12-06 DIAGNOSIS — E03.9 HYPOTHYROIDISM, UNSPECIFIED TYPE: ICD-10-CM

## 2019-12-06 LAB
T3 SERPL-MCNC: 86 NG/DL (ref 60–180)
T4 FREE SERPL-MCNC: 0.87 NG/DL (ref 0.71–1.51)
TSH SERPL DL<=0.005 MIU/L-ACNC: 0.04 UIU/ML (ref 0.4–4)

## 2019-12-06 PROCEDURE — 36415 COLL VENOUS BLD VENIPUNCTURE: CPT | Mod: PO

## 2019-12-06 PROCEDURE — 84439 ASSAY OF FREE THYROXINE: CPT

## 2019-12-06 PROCEDURE — 84480 ASSAY TRIIODOTHYRONINE (T3): CPT

## 2019-12-06 PROCEDURE — 84443 ASSAY THYROID STIM HORMONE: CPT

## 2019-12-09 ENCOUNTER — TELEPHONE (OUTPATIENT)
Dept: ENDOCRINOLOGY | Facility: CLINIC | Age: 29
End: 2019-12-09

## 2019-12-09 ENCOUNTER — PATIENT MESSAGE (OUTPATIENT)
Dept: ENDOCRINOLOGY | Facility: CLINIC | Age: 29
End: 2019-12-09

## 2019-12-09 DIAGNOSIS — E03.9 HYPOTHYROIDISM, UNSPECIFIED TYPE: Primary | ICD-10-CM

## 2019-12-09 DIAGNOSIS — C73 THYROID CANCER: ICD-10-CM

## 2019-12-09 RX ORDER — LIOTHYRONINE SODIUM 5 UG/1
TABLET ORAL
Qty: 60 TABLET | Refills: 9 | Status: SHIPPED | OUTPATIENT
Start: 2019-12-09 | End: 2020-01-10 | Stop reason: SDUPTHER

## 2019-12-09 RX ORDER — LEVOTHYROXINE SODIUM 112 UG/1
112 TABLET ORAL DAILY
Qty: 30 TABLET | Refills: 11 | Status: SHIPPED | OUTPATIENT
Start: 2019-12-09 | End: 2020-01-10 | Stop reason: SDUPTHER

## 2019-12-09 NOTE — TELEPHONE ENCOUNTER
Informed pt of Dr. Rosa message in [facility]  Pt will go  new Rx of synthAMGas  Lab appt rescheduled to 1/6/20 in [place] per pt request

## 2019-12-09 NOTE — TELEPHONE ENCOUNTER
If having those symptoms, I think it is best to reduce the dose. Let us know if does not improve with reduced dose. Also keep track to see if happens after taking cytomel    Decrease synthroid from 125 mcg to 112 mcg once daily    Add Tg to f/u labs     I sent 30 days to Tayo rubi. May want to do 30 day until we repeat prior to f/u in case the dose needs to be changed

## 2019-12-09 NOTE — TELEPHONE ENCOUNTER
----- Message from Gregoria Cool sent at 12/9/2019  2:29 PM CST -----  Contact: PT  Type:  Patient Returning Call    Who Called:  Pt  Who Left Message for Patient:  katie  Does the patient know what this is regarding?:  Symptoms  and medication  Best Call Back Number: [phone]  Additional Information:  Please Advise ---Thank you      .

## 2020-01-04 ENCOUNTER — LAB VISIT (OUTPATIENT)
Dept: LAB | Facility: HOSPITAL | Age: 30
End: 2020-01-04
Attending: INTERNAL MEDICINE
Payer: COMMERCIAL

## 2020-01-04 DIAGNOSIS — C73 THYROID CANCER: ICD-10-CM

## 2020-01-04 DIAGNOSIS — E03.9 HYPOTHYROIDISM, UNSPECIFIED TYPE: ICD-10-CM

## 2020-01-04 LAB
T3 SERPL-MCNC: 89 NG/DL (ref 60–180)
T4 FREE SERPL-MCNC: 1.18 NG/DL (ref 0.71–1.51)
TSH SERPL DL<=0.005 MIU/L-ACNC: 0.01 UIU/ML (ref 0.4–4)

## 2020-01-04 PROCEDURE — 84443 ASSAY THYROID STIM HORMONE: CPT

## 2020-01-04 PROCEDURE — 84480 ASSAY TRIIODOTHYRONINE (T3): CPT

## 2020-01-04 PROCEDURE — 36415 COLL VENOUS BLD VENIPUNCTURE: CPT | Mod: PO

## 2020-01-04 PROCEDURE — 86800 THYROGLOBULIN ANTIBODY: CPT

## 2020-01-04 PROCEDURE — 84439 ASSAY OF FREE THYROXINE: CPT

## 2020-01-07 LAB
THRYOGLOBULIN INTERPRETATION: ABNORMAL
THYROGLOB AB SERPL-ACNC: 67 IU/ML
THYROGLOB SERPL-MCNC: <0.1 NG/ML

## 2020-01-10 ENCOUNTER — OFFICE VISIT (OUTPATIENT)
Dept: ENDOCRINOLOGY | Facility: CLINIC | Age: 30
End: 2020-01-10
Payer: COMMERCIAL

## 2020-01-10 VITALS
DIASTOLIC BLOOD PRESSURE: 62 MMHG | SYSTOLIC BLOOD PRESSURE: 104 MMHG | BODY MASS INDEX: 19.88 KG/M2 | HEIGHT: 62 IN | WEIGHT: 108 LBS | HEART RATE: 75 BPM | OXYGEN SATURATION: 99 %

## 2020-01-10 DIAGNOSIS — E03.9 HYPOTHYROIDISM, UNSPECIFIED TYPE: ICD-10-CM

## 2020-01-10 DIAGNOSIS — C73 THYROID CANCER: Primary | ICD-10-CM

## 2020-01-10 PROCEDURE — 99999 PR PBB SHADOW E&M-EST. PATIENT-LVL III: CPT | Mod: PBBFAC,,, | Performed by: INTERNAL MEDICINE

## 2020-01-10 PROCEDURE — 3008F PR BODY MASS INDEX (BMI) DOCUMENTED: ICD-10-PCS | Mod: CPTII,S$GLB,, | Performed by: INTERNAL MEDICINE

## 2020-01-10 PROCEDURE — 3008F BODY MASS INDEX DOCD: CPT | Mod: CPTII,S$GLB,, | Performed by: INTERNAL MEDICINE

## 2020-01-10 PROCEDURE — 99213 OFFICE O/P EST LOW 20 MIN: CPT | Mod: S$GLB,,, | Performed by: INTERNAL MEDICINE

## 2020-01-10 PROCEDURE — 99999 PR PBB SHADOW E&M-EST. PATIENT-LVL III: ICD-10-PCS | Mod: PBBFAC,,, | Performed by: INTERNAL MEDICINE

## 2020-01-10 PROCEDURE — 99213 PR OFFICE/OUTPT VISIT, EST, LEVL III, 20-29 MIN: ICD-10-PCS | Mod: S$GLB,,, | Performed by: INTERNAL MEDICINE

## 2020-01-10 RX ORDER — LEVOTHYROXINE SODIUM 112 UG/1
112 TABLET ORAL DAILY
Qty: 90 TABLET | Refills: 3 | Status: SHIPPED | OUTPATIENT
Start: 2020-01-10 | End: 2021-01-06 | Stop reason: SDUPTHER

## 2020-01-10 RX ORDER — LIOTHYRONINE SODIUM 5 UG/1
TABLET ORAL
Qty: 180 TABLET | Refills: 3 | Status: SHIPPED | OUTPATIENT
Start: 2020-01-10 | End: 2021-01-06 | Stop reason: SDUPTHER

## 2020-01-10 NOTE — PROGRESS NOTES
(OB- Dr. Orellana)  CHIEF COMPLAINT: Papillary Thyroid Cancer   29 year old being seen as a f/u. S/P total thyroidectomy 6/13. She received 100 mCi on 9/13 (She received tracer scan prior to determine dose). On synthroid 112 mcg and cytomel 5 mcg BID. No palpitations.           FINAL PATHOLOGIC DIAGNOSIS   1. 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   2. THYROID (LEFT LOBE, CLINICAL): NO TUMOR SEEN.   THYROID CANCER CASE SUMMARY   Thyroid gland resection   Procedure: right lobectomy   Specimen integrity: separate right and left lobes   Specimen size: 6.0 cm   Tumor focality: single focus   Tumor laterality: right   Tumor size: 2.0 cm   Histologic type: papillary carcinoma, tall cell variant   Margins: not involved; closest margin < 0.1cm   Tumor capsule: totally encapsulated   Tumor capsular invasion: minimally invasive   Lymph-vascular invasion: not identified   Extrathyroid extension: not identified   Pathologic stage: I (pT1b NX)     PAST MEDICAL HISTORY/PAST SURGICAL HISTORY: Reviewed in [employer]     SOCIAL HISTORY: No T/A. .     FAMILY HISTORY: No thyroid cancer. Distant relative had hyperthyroidism.     MEDICATIONS/ALLERGIES: The patient's MedCard has been updated and reviewed.       ROS:   Constitutional: No fatigue.    Eyes: No recent visual changes   ENT: No dysphagia   Cardiovascular: Denies current anginal symptoms   Respiratory: Denies current respiratory difficulty   Gastrointestinal: No N/V   GenitoUrinary - No dysuria   Skin: No new skin rash   Neurologic: No focal neurologic complaints   Remainder ROS negative       PE:   GENERAL: Well developed, well nourished.   NECK: Supple, trachea midline, surgical scar intact. No LAD  CHEST: Resp even and unlabored, CTA bilateral.   CARDIAC: RRR, S1, S2 heard, no murmurs, rubs, S3, or S4      Results for CARLITA WORRELL (MRN [id number]) as of 1/10/2020 09:32   Ref. Range 1/4/2020 08:35   TSH Latest Ref  Range: 0.400 - 4.000 uIU/mL 0.011 (L)   T3, Total Latest Ref Range: 60 - 180 ng/dL 89   Free T4 Latest Ref Range: 0.71 - 1.51 ng/dL 1.18   Thyroglobulin Interpretation Unknown SEE BELOW   Thyroglobulin Antibody Screen Latest Ref Range: <4.0 IU/mL 67 (H)   Thyroglobulin, Tumor Marker Latest Units: ng/mL <0.1           ASSESSMENT/PLAN:   1. 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 TG Ab +. With minimal invasion of capsule but within margins. S/P 100 mCi. Post Tx WBS was normal. Her Tg Ab are elevated. 1 year WBS shows no iodine avid disease. AB were increased, so PET scan done 10/14 which was negative. Will continue to monitor Tg Ab. Check US at f/u.     2. Hypothyroidism- See # 1. Monitor for hyperthyroid symptoms    FOLLOWUP:  F/U 6 months- TSH, Ft4, T3, Tg, Thyroid US

## 2020-06-01 NOTE — TELEPHONE ENCOUNTER
How Severe Is Your Eczema?: mild I received a [facility] that [facility] has been scheduled on 11/26/2019.   Is This A New Presentation, Or A Follow-Up?: Rash

## 2020-07-01 ENCOUNTER — PATIENT MESSAGE (OUTPATIENT)
Dept: ENDOCRINOLOGY | Facility: CLINIC | Age: 30
End: 2020-07-01

## 2020-07-01 DIAGNOSIS — G43.009 MIGRAINE WITHOUT AURA AND WITHOUT STATUS MIGRAINOSUS, NOT INTRACTABLE: ICD-10-CM

## 2020-07-02 ENCOUNTER — PATIENT MESSAGE (OUTPATIENT)
Dept: ENDOCRINOLOGY | Facility: CLINIC | Age: 30
End: 2020-07-02

## 2020-07-02 DIAGNOSIS — Z01.84 ENCOUNTER FOR ANTIBODY RESPONSE EXAMINATION: ICD-10-CM

## 2020-07-02 RX ORDER — SUMATRIPTAN SUCCINATE 100 MG/1
100 TABLET ORAL
Qty: 9 TABLET | Refills: 3 | Status: SHIPPED | OUTPATIENT
Start: 2020-07-02 | End: 2020-07-07

## 2020-07-02 NOTE — TELEPHONE ENCOUNTER
sumatriptan (IMITREX) 100 MG tablet () 100 mg, Every 2 hours PRN EditCancel Reorder       Summary: Take 1 tablet (100 mg total) by mouth every 2 (two) hours as needed for Migraine., Starting 2019, Until Fri 10/11/2019, Normal   Dose, Route, Frequency: 100 mg, Oral, Every 2 hours PRN  Start: 2019  End: 10/11/2019  Ord/Sold: 2019 (O)  Report  Adh:   Long-term:   Pharmacy: [id number] [address]  Med Dose History       Patient Sig: Take 1 tablet (100 mg total) by mouth every 2 (two) hours as needed for Migraine.       Ordered on: 2019       Authorized by: LUNA BROWER       Dispense: 9 tablet       Refills: 3 ordered        Last office visit: 2019

## 2020-07-06 ENCOUNTER — PATIENT MESSAGE (OUTPATIENT)
Dept: ENDOCRINOLOGY | Facility: CLINIC | Age: 30
End: 2020-07-06

## 2020-07-07 ENCOUNTER — TELEPHONE (OUTPATIENT)
Dept: SLEEP MEDICINE | Facility: CLINIC | Age: 30
End: 2020-07-07

## 2020-07-07 DIAGNOSIS — G43.009 MIGRAINE WITHOUT AURA AND WITHOUT STATUS MIGRAINOSUS, NOT INTRACTABLE: ICD-10-CM

## 2020-07-07 NOTE — TELEPHONE ENCOUNTER
----- Message from Leroy Cruz sent at 7/7/2020  9:19 AM CDT -----      Can the clinic reply in [facility]:Y        Please refill the medication(s) listed below. Please call the patient when the prescription(s) is ready for  at this phone number.Pt called said Pharmacy haven't received the script I saw it was printed Pt would like it Escript to the pharmacy.        Medication #1 sumatriptan (IMITREX) 100 MG tablet    Medication #2       Preferred Pharmacy: [id number] [address] AT [facility] [phone] (Phone)  [phone] (Fax)

## 2020-07-08 ENCOUNTER — HOSPITAL ENCOUNTER (OUTPATIENT)
Dept: RADIOLOGY | Facility: OTHER | Age: 30
Discharge: HOME | End: 2020-07-08
Attending: INTERNAL MEDICINE
Payer: COMMERCIAL

## 2020-07-08 DIAGNOSIS — E03.9 HYPOTHYROIDISM, UNSPECIFIED TYPE: ICD-10-CM

## 2020-07-08 PROCEDURE — 76536 US SOFT TISSUE HEAD NECK THYROID: ICD-10-PCS | Mod: 26,,, | Performed by: RADIOLOGY

## 2020-07-08 PROCEDURE — 76536 US EXAM OF HEAD AND NECK: CPT | Mod: 26,,, | Performed by: RADIOLOGY

## 2020-07-08 PROCEDURE — 76536 US EXAM OF HEAD AND NECK: CPT | Mod: TC

## 2020-07-17 ENCOUNTER — OFFICE VISIT (OUTPATIENT)
Dept: ENDOCRINOLOGY | Facility: CLINIC | Age: 30
End: 2020-07-17
Payer: COMMERCIAL

## 2020-07-17 DIAGNOSIS — C73 THYROID CANCER: Primary | ICD-10-CM

## 2020-07-17 DIAGNOSIS — E03.9 HYPOTHYROIDISM, UNSPECIFIED TYPE: ICD-10-CM

## 2020-07-17 PROCEDURE — 99213 OFFICE O/P EST LOW 20 MIN: CPT | Mod: 95,,, | Performed by: INTERNAL MEDICINE

## 2020-07-17 PROCEDURE — 99213 PR OFFICE/OUTPT VISIT, EST, LEVL III, 20-29 MIN: ICD-10-PCS | Mod: 95,,, | Performed by: INTERNAL MEDICINE

## 2020-07-17 NOTE — PROGRESS NOTES
(OB- Dr. Orellana)  CHIEF COMPLAINT: Papillary Thyroid Cancer   30 year old being seen as a f/u. S/P total thyroidectomy 6/13. She received 100 mCi on 9/13 (She received tracer scan prior to determine dose). On synthroid 112 mcg and cytomel 5 mcg BID. No Palpitations. No tremors. Has some stress due to COVID 19. Was having insomnia, but better now.           FINAL PATHOLOGIC DIAGNOSIS   1. 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   2. THYROID (LEFT LOBE, CLINICAL): NO TUMOR SEEN.   THYROID CANCER CASE SUMMARY   Thyroid gland resection   Procedure: right lobectomy   Specimen integrity: separate right and left lobes   Specimen size: 6.0 cm   Tumor focality: single focus   Tumor laterality: right   Tumor size: 2.0 cm   Histologic type: papillary carcinoma, tall cell variant   Margins: not involved; closest margin < 0.1cm   Tumor capsule: totally encapsulated   Tumor capsular invasion: minimally invasive   Lymph-vascular invasion: not identified   Extrathyroid extension: not identified   Pathologic stage: I (pT1b NX)     PAST MEDICAL HISTORY/PAST SURGICAL HISTORY: Reviewed in [employer]     SOCIAL HISTORY: No T/A. .     FAMILY HISTORY: No thyroid cancer. Distant relative had hyperthyroidism.     MEDICATIONS/ALLERGIES: The patient's MedCard has been updated and reviewed.       ROS:   Constitutional: No fatigue.    Eyes: No recent visual changes   ENT: No dysphagia   Cardiovascular: Denies current anginal symptoms   Respiratory: Denies current respiratory difficulty   Gastrointestinal: No N/V   GenitoUrinary - No dysuria   Skin: No new skin rash   Neurologic: Has been having more migraines.    Remainder ROS negative       PE: Virtual Visit    Results for CARLITA WORRELL UDAY (MRN [id number]) as of 7/17/2020 10:30   Ref. Range 7/8/2020 16:03   TSH Latest Ref Range: 0.400 - 4.000 uIU/mL 0.124 (L)   T3, Total Latest Ref Range: 60 - 180 ng/dL 91   Free T4 Latest Ref Range: 0.71  - 1.51 ng/dL 1.03   Thyroglobulin Interpretation Unknown SEE BELOW   Thyroglobulin Antibody Screen Latest Ref Range: <1.8 IU/mL 78 (H)   Thyroglobulin, Tumor Marker Latest Units: ng/mL <0.1   COVID-19 (SARS CoV-2) IgG Ab Latest Ref Range: Negative  Negative     US SOFT TISSUE HEAD NECK THYROID     CLINICAL HISTORY:  .  Hypothyroidism, unspecified     TECHNIQUE:  Ultrasound of the thyroid and cervical lymph nodes was performed.     COMPARISON:  06/21/2019.     FINDINGS:  Prior thyroidectomy.  No residual tissue identified within the thyroidectomy bed.     No enlarged cervical lymph nodes identified.     Impression:     Prior thyroidectomy.     No enlarged cervical lymph nodes identified.      ASSESSMENT/PLAN:   1. 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 TG Ab +. With minimal invasion of capsule but within margins. S/P 100 mCi. Post Tx WBS was normal. Her Tg Ab are elevated. 1 year WBS shows no iodine avid disease. AB were increased, so PET scan done 10/14 which was negative. Will continue to monitor Tg Ab.they are relatively stable. No visible disease on Ultrasound. Since Tg Ab still elevated and BRAF + Will keep TSH suppressed.     2. Hypothyroidism- See # 1. Monitor for hyperthyroid symptoms    FOLLOWUP:  F/U 6 months- TSH, Ft4, T3, Tg- virtual

## 2020-08-16 ENCOUNTER — PATIENT MESSAGE (OUTPATIENT)
Dept: ENDOCRINOLOGY | Facility: CLINIC | Age: 30
End: 2020-08-16

## 2020-08-16 DIAGNOSIS — M79.10 MUSCLE PAIN: ICD-10-CM

## 2020-08-17 ENCOUNTER — PATIENT MESSAGE (OUTPATIENT)
Dept: ENDOCRINOLOGY | Facility: CLINIC | Age: 30
End: 2020-08-17

## 2020-08-17 DIAGNOSIS — E03.9 HYPOTHYROIDISM, UNSPECIFIED TYPE: Primary | ICD-10-CM

## 2020-08-18 ENCOUNTER — PATIENT MESSAGE (OUTPATIENT)
Dept: ENDOCRINOLOGY | Facility: CLINIC | Age: 30
End: 2020-08-18

## 2020-08-18 ENCOUNTER — LAB VISIT (OUTPATIENT)
Dept: LAB | Facility: HOSPITAL | Age: 30
End: 2020-08-18
Attending: INTERNAL MEDICINE
Payer: COMMERCIAL

## 2020-08-18 DIAGNOSIS — E03.9 HYPOTHYROIDISM, UNSPECIFIED TYPE: ICD-10-CM

## 2020-08-18 PROCEDURE — 85027 COMPLETE CBC AUTOMATED: CPT

## 2020-08-18 PROCEDURE — 84480 ASSAY TRIIODOTHYRONINE (T3): CPT

## 2020-08-18 PROCEDURE — 84443 ASSAY THYROID STIM HORMONE: CPT

## 2020-08-18 PROCEDURE — 80053 COMPREHEN METABOLIC PANEL: CPT

## 2020-08-18 PROCEDURE — 84439 ASSAY OF FREE THYROXINE: CPT

## 2020-08-18 PROCEDURE — 36415 COLL VENOUS BLD VENIPUNCTURE: CPT | Mod: PO

## 2020-08-19 LAB
ALBUMIN SERPL BCP-MCNC: 3.7 G/DL (ref 3.5–5.2)
ALP SERPL-CCNC: 55 U/L (ref 55–135)
ALT SERPL W/O P-5'-P-CCNC: 35 U/L (ref 10–44)
ANION GAP SERPL CALC-SCNC: 10 MMOL/L (ref 8–16)
AST SERPL-CCNC: 38 U/L (ref 10–40)
BILIRUB SERPL-MCNC: 0.5 MG/DL (ref 0.1–1)
BUN SERPL-MCNC: 15 MG/DL (ref 6–20)
CALCIUM SERPL-MCNC: 9 MG/DL (ref 8.7–10.5)
CHLORIDE SERPL-SCNC: 104 MMOL/L (ref 95–110)
CO2 SERPL-SCNC: 24 MMOL/L (ref 23–29)
CREAT SERPL-MCNC: 0.8 MG/DL (ref 0.5–1.4)
ERYTHROCYTE [DISTWIDTH] IN BLOOD BY AUTOMATED COUNT: 13.2 % (ref 11.5–14.5)
EST. GFR  (AFRICAN AMERICAN): >60 ML/MIN/1.73 M^2
EST. GFR  (NON AFRICAN AMERICAN): >60 ML/MIN/1.73 M^2
GLUCOSE SERPL-MCNC: 102 MG/DL (ref 70–110)
HCT VFR BLD AUTO: 39.9 % (ref 37–48.5)
HGB BLD-MCNC: 13.2 G/DL (ref 12–16)
MCH RBC QN AUTO: 30.9 PG (ref 27–31)
MCHC RBC AUTO-ENTMCNC: 33.1 G/DL (ref 32–36)
MCV RBC AUTO: 93 FL (ref 82–98)
PLATELET # BLD AUTO: 179 K/UL (ref 150–350)
PMV BLD AUTO: 10.1 FL (ref 9.2–12.9)
POTASSIUM SERPL-SCNC: 3.9 MMOL/L (ref 3.5–5.1)
PROT SERPL-MCNC: 7.5 G/DL (ref 6–8.4)
RBC # BLD AUTO: 4.27 M/UL (ref 4–5.4)
SODIUM SERPL-SCNC: 138 MMOL/L (ref 136–145)
T3 SERPL-MCNC: 86 NG/DL (ref 60–180)
T4 FREE SERPL-MCNC: 0.92 NG/DL (ref 0.71–1.51)
TSH SERPL DL<=0.005 MIU/L-ACNC: 0.19 UIU/ML (ref 0.4–4)
WBC # BLD AUTO: 8.02 K/UL (ref 3.9–12.7)

## 2020-09-21 ENCOUNTER — PATIENT MESSAGE (OUTPATIENT)
Dept: ENDOCRINOLOGY | Facility: CLINIC | Age: 30
End: 2020-09-21

## 2020-09-23 ENCOUNTER — PATIENT MESSAGE (OUTPATIENT)
Dept: ENDOCRINOLOGY | Facility: CLINIC | Age: 30
End: 2020-09-23

## 2020-09-23 NOTE — TELEPHONE ENCOUNTER
If it is for the COVID nasal swab, that would most likely need to come from the ordering provider or the urgent care facility. They would be the one that submitted the claim and put a diagnosis code.     I can write a letter, but that may not what be the insurance is looking for as I am not sure what diagnosis code they used.     To Whom it May Concern,      On 2020 our clinic was notified by patient Idalia Lee ( 86) for fatigue, joint pain, diaphoresis as well as migraines. Based on symptoms we recommended that she get testing for COVID 19. She subsequently went to urgent care to get a rapid test. This should be a covered test as she was symptomatic. Please let us know if any questions    Sincerely,     Bradly Rosa DO, FACE

## 2020-12-20 ENCOUNTER — OFFICE VISIT (OUTPATIENT)
Dept: URGENT CARE | Facility: CLINIC | Age: 30
End: 2020-12-20
Payer: COMMERCIAL

## 2020-12-20 VITALS
OXYGEN SATURATION: 99 % | BODY MASS INDEX: 19.88 KG/M2 | SYSTOLIC BLOOD PRESSURE: 122 MMHG | TEMPERATURE: 98 F | RESPIRATION RATE: 16 BRPM | HEIGHT: 62 IN | HEART RATE: 90 BPM | WEIGHT: 108 LBS | DIASTOLIC BLOOD PRESSURE: 80 MMHG

## 2020-12-20 DIAGNOSIS — R09.81 NASAL CONGESTION: Primary | ICD-10-CM

## 2020-12-20 DIAGNOSIS — J06.9 UPPER RESPIRATORY TRACT INFECTION, UNSPECIFIED TYPE: ICD-10-CM

## 2020-12-20 LAB
CTP QC/QA: YES
MOLECULAR STREP A: NEGATIVE
POC MOLECULAR INFLUENZA A AGN: NEGATIVE
POC MOLECULAR INFLUENZA B AGN: NEGATIVE
SARS-COV-2 RDRP RESP QL NAA+PROBE: NEGATIVE

## 2020-12-20 PROCEDURE — 87502 POCT INFLUENZA A/B MOLECULAR: ICD-10-PCS | Mod: QW,S$GLB,, | Performed by: NURSE PRACTITIONER

## 2020-12-20 PROCEDURE — 99203 OFFICE O/P NEW LOW 30 MIN: CPT | Mod: S$GLB,,, | Performed by: NURSE PRACTITIONER

## 2020-12-20 PROCEDURE — U0002 COVID-19 LAB TEST NON-CDC: HCPCS | Mod: QW,S$GLB,, | Performed by: NURSE PRACTITIONER

## 2020-12-20 PROCEDURE — 87651 STREP A DNA AMP PROBE: CPT | Mod: QW,S$GLB,, | Performed by: NURSE PRACTITIONER

## 2020-12-20 PROCEDURE — U0002: ICD-10-PCS | Mod: QW,S$GLB,, | Performed by: NURSE PRACTITIONER

## 2020-12-20 PROCEDURE — 87651 POCT STREP A MOLECULAR: ICD-10-PCS | Mod: QW,S$GLB,, | Performed by: NURSE PRACTITIONER

## 2020-12-20 PROCEDURE — 3008F BODY MASS INDEX DOCD: CPT | Mod: CPTII,S$GLB,, | Performed by: NURSE PRACTITIONER

## 2020-12-20 PROCEDURE — 87502 INFLUENZA DNA AMP PROBE: CPT | Mod: QW,S$GLB,, | Performed by: NURSE PRACTITIONER

## 2020-12-20 PROCEDURE — 3008F PR BODY MASS INDEX (BMI) DOCUMENTED: ICD-10-PCS | Mod: CPTII,S$GLB,, | Performed by: NURSE PRACTITIONER

## 2020-12-20 PROCEDURE — 99203 PR OFFICE/OUTPT VISIT, NEW, LEVL III, 30-44 MIN: ICD-10-PCS | Mod: S$GLB,,, | Performed by: NURSE PRACTITIONER

## 2020-12-20 NOTE — PROGRESS NOTES
"Subjective:       Patient ID: Idalia Lee is a 30 y.o. female.    Vitals:  height is 5' 2" (1.575 m) and weight is 49 kg (108 lb). Her temperature is 98 °F (36.7 °C). Her blood pressure is 122/80 and her pulse is 90. Her respiration is 16 and oxygen saturation is 99%.     Chief Complaint: Sinus Problem    Patient reports sinus symptoms since Friday.Patient request Flu, Strep, and Covid testing.    Sinus Problem  This is a new problem. The current episode started in the past 7 days. The problem has been gradually worsening since onset. There has been no fever. She is experiencing no pain. Associated symptoms include congestion, headaches and a sore throat. Pertinent negatives include no chills, coughing, diaphoresis, ear pain, shortness of breath or sinus pressure. Treatments tried: Day Quil and Ny Quil;Tylenol cold and flu. The treatment provided no relief.       Constitution: Positive for appetite change and fatigue. Negative for chills, sweating and fever.   HENT: Positive for congestion, postnasal drip and sore throat. Negative for ear pain, sinus pain, sinus pressure and voice change.    Neck: Negative for painful lymph nodes.   Eyes: Negative for eye redness.   Respiratory: Negative for chest tightness, cough, sputum production, bloody sputum, COPD, shortness of breath, stridor, wheezing and asthma.    Gastrointestinal: Negative for nausea and vomiting.   Musculoskeletal: Positive for muscle ache.   Skin: Negative for rash.   Allergic/Immunologic: Negative for seasonal allergies and asthma.   Neurological: Positive for headaches.   Hematologic/Lymphatic: Negative for swollen lymph nodes.       Objective:      Physical Exam   Constitutional: She is oriented to person, place, and time. She appears well-developed. She is cooperative.  Non-toxic appearance. She does not appear ill. No distress.   HENT:   Head: Normocephalic and atraumatic.   Ears:   Right Ear: Hearing, external ear and ear canal " normal. Tympanic membrane is bulging. Tympanic membrane is not injected and not erythematous.   Left Ear: Hearing, tympanic membrane, external ear and ear canal normal. Tympanic membrane is not injected, not erythematous and not bulging.   Nose: Nose normal. No mucosal edema, rhinorrhea or nasal deformity. No epistaxis. Right sinus exhibits no maxillary sinus tenderness and no frontal sinus tenderness. Left sinus exhibits no maxillary sinus tenderness and no frontal sinus tenderness.   Mouth/Throat: Uvula is midline and mucous membranes are normal. Mucous membranes are moist. No trismus in the jaw. Normal dentition. No uvula swelling. Posterior oropharyngeal erythema present. No oropharyngeal exudate or posterior oropharyngeal edema. Oropharynx is clear.       Eyes: Pupils are equal, round, and reactive to light. Conjunctivae and lids are normal. No scleral icterus. extraocular movement intact  Neck: Trachea normal, full passive range of motion without pain and phonation normal. Neck supple. No neck rigidity. No edema and no erythema present.   Cardiovascular: Normal rate, regular rhythm, S1 normal, S2 normal, normal heart sounds and normal pulses. Exam reveals no decreased pulses.   Pulmonary/Chest: Effort normal and breath sounds normal. No accessory muscle usage. No respiratory distress. She has no decreased breath sounds. She has no wheezes. She has no rhonchi. She has no rales.   Abdominal: Normal appearance.   Musculoskeletal: Normal range of motion.         General: No deformity.   Neurological: She is alert and oriented to person, place, and time. She exhibits normal muscle tone. Coordination normal.   Skin: Skin is warm, dry, intact, not diaphoretic and not pale. Psychiatric: Her speech is normal and behavior is normal. Judgment and thought content normal.   Nursing note and vitals reviewed.        Patient denies any exposure to anyone positive for COVID-19 virus.  States her son had a cold last week.     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 Nasal congestion    2. Upper respiratory tract infection, unspecified type        Plan:         Nasal congestion  -     POCT COVID-19 Rapid Screening  -     Cancel: POCT Influenza A/B  -     POCT Strep A, Molecular  -     POCT Influenza A/B MOLECULAR    Upper respiratory tract infection, unspecified type      Patient Instructions   Your test was NEGATIVE for COVID-19 (coronavirus), Influenza Virus and Strep Throat.      You may leave home and/or return to work when the following conditions are met:   24 hours fever free without fever-reducing medications AND   Improved symptoms      If your symptoms worsen or if you have any other concerns, please contact [facility] at [phone].     Sincerely,    Angeles Guevara NP  Viral Upper Respiratory Illness (Adult)  You have a viral upper respiratory illness (URI), which is another term for the common cold. This illness is contagious during the first few days. It is spread through the air by coughing and sneezing. It may also be spread by direct contact (touching the sick person and then touching your own eyes, nose, or mouth). Frequent handwashing will decrease risk of spread. Most viral illnesses go away within 7 to 10 days with rest and simple home remedies. Sometimes the illness may last for several weeks. Antibiotics will not kill a virus, and they are  generally not prescribed for this condition.    Home care  · If symptoms are severe, rest at home for the first 2 to 3 days. When you resume activity, don't let yourself get too tired.  · Avoid being exposed to cigarette smoke (yours or others).  · You may use acetaminophen or ibuprofen to control pain and fever, unless another medicine was prescribed. (Note: If you have chronic liver or kidney disease, have ever had a stomach ulcer or gastrointestinal bleeding, or are taking blood-thinning medicines, talk with your healthcare provider before using these medicines.) Aspirin should never be given to anyone under 18 years of age who is ill with a viral infection or fever. It may cause severe liver or brain damage.  · Your appetite may be poor, so a light diet is fine. Avoid dehydration by drinking 6 to 8 glasses of fluids per day (water, soft drinks, juices, tea, or soup). Extra fluids will help loosen secretions in the nose and lungs.  · Over-the-counter cold medicines will not shorten the length of time youre sick, but they may be helpful for the following symptoms: cough, sore throat, and nasal and sinus congestion. (Note: Do not use decongestants if you have high blood pressure.)  Follow-up care  Follow up with your healthcare provider, or as advised.  When to seek medical advice  Call your healthcare provider right away if any of these occur:  · Cough with lots of colored sputum (mucus)  · Severe headache; face, neck, or ear pain  · Difficulty swallowing due to throat pain  · Fever of 100.4°F (38°C)  Call 911, or get immediate medical care  Call emergency services right away if any of these occur:  · Chest pain, shortness of breath, wheezing, or difficulty breathing  · Coughing up blood  · Inability to swallow due to throat pain  Date Last Reviewed: 9/13/2015  © 5833-5936 [employer]. [address] [place] [address], [place] [address]. All rights reserved. This information is not intended as a substitute  for professional medical care. Always follow your healthcare professional's instructions.

## 2020-12-20 NOTE — PATIENT INSTRUCTIONS
Your test was NEGATIVE for COVID-19 (coronavirus), Influenza Virus and Strep Throat.      You may leave home and/or return to work when the following conditions are met:   24 hours fever free without fever-reducing medications AND   Improved symptoms      If your symptoms worsen or if you have any other concerns, please contact [facility] at [phone].     Sincerely,    Angeles Guevara, NP  Viral Upper Respiratory Illness (Adult)  You have a viral upper respiratory illness (URI), which is another term for the common cold. This illness is contagious during the first few days. It is spread through the air by coughing and sneezing. It may also be spread by direct contact (touching the sick person and then touching your own eyes, nose, or mouth). Frequent handwashing will decrease risk of spread. Most viral illnesses go away within 7 to 10 days with rest and simple home remedies. Sometimes the illness may last for several weeks. Antibiotics will not kill a virus, and they are generally not prescribed for this condition.    Home care  · If symptoms are severe, rest at home for the first 2 to 3 days. When you resume activity, don't let yourself get too tired.  · Avoid being exposed to cigarette smoke (yours or others).  · You may use acetaminophen or ibuprofen to control pain and fever, unless another medicine was prescribed. (Note: If you have chronic liver or kidney disease, have ever had a stomach ulcer or gastrointestinal bleeding, or are taking blood-thinning medicines, talk with your healthcare provider before using these medicines.) Aspirin should never be given to anyone under 18 years of age who is ill with a viral infection or fever. It may cause severe liver or brain damage.  · Your appetite may be poor, so a light diet is fine. Avoid dehydration by drinking 6 to 8 glasses of fluids per day (water, soft drinks, juices, tea, or soup). Extra fluids will help loosen secretions in the nose and  lungs.  · Over-the-counter cold medicines will not shorten the length of time youre sick, but they may be helpful for the following symptoms: cough, sore throat, and nasal and sinus congestion. (Note: Do not use decongestants if you have high blood pressure.)  Follow-up care  Follow up with your healthcare provider, or as advised.  When to seek medical advice  Call your healthcare provider right away if any of these occur:  · Cough with lots of colored sputum (mucus)  · Severe headache; face, neck, or ear pain  · Difficulty swallowing due to throat pain  · Fever of 100.4°F (38°C)  Call 911, or get immediate medical care  Call emergency services right away if any of these occur:  · Chest pain, shortness of breath, wheezing, or difficulty breathing  · Coughing up blood  · Inability to swallow due to throat pain  Date Last Reviewed: 9/13/2015  © 4292-0714 [employer]. [address] [place] [address], [place] [address]. All rights reserved. This information is not intended as a substitute for professional medical care. Always follow your healthcare professional's instructions.

## 2021-01-06 ENCOUNTER — PATIENT MESSAGE (OUTPATIENT)
Dept: ENDOCRINOLOGY | Facility: CLINIC | Age: 31
End: 2021-01-06

## 2021-01-06 RX ORDER — LEVOTHYROXINE SODIUM 112 UG/1
112 TABLET ORAL DAILY
Qty: 30 TABLET | Refills: 3 | Status: SHIPPED | OUTPATIENT
Start: 2021-01-06 | End: 2021-01-29 | Stop reason: SDUPTHER

## 2021-01-06 RX ORDER — LIOTHYRONINE SODIUM 5 UG/1
TABLET ORAL
Qty: 60 TABLET | Refills: 3 | Status: SHIPPED | OUTPATIENT
Start: 2021-01-06 | End: 2021-01-29 | Stop reason: SDUPTHER

## 2021-01-08 ENCOUNTER — PATIENT MESSAGE (OUTPATIENT)
Dept: ENDOCRINOLOGY | Facility: CLINIC | Age: 31
End: 2021-01-08

## 2021-01-13 ENCOUNTER — PATIENT MESSAGE (OUTPATIENT)
Dept: ENDOCRINOLOGY | Facility: CLINIC | Age: 31
End: 2021-01-13

## 2021-01-14 ENCOUNTER — PATIENT MESSAGE (OUTPATIENT)
Dept: ENDOCRINOLOGY | Facility: CLINIC | Age: 31
End: 2021-01-14

## 2021-01-14 DIAGNOSIS — Z01.84 ENCOUNTER FOR ANTIBODY RESPONSE EXAMINATION: Primary | ICD-10-CM

## 2021-01-16 ENCOUNTER — LAB VISIT (OUTPATIENT)
Dept: LAB | Facility: HOSPITAL | Age: 31
End: 2021-01-16
Attending: INTERNAL MEDICINE
Payer: COMMERCIAL

## 2021-01-16 DIAGNOSIS — C73 THYROID CANCER: ICD-10-CM

## 2021-01-16 DIAGNOSIS — Z01.84 ENCOUNTER FOR ANTIBODY RESPONSE EXAMINATION: ICD-10-CM

## 2021-01-16 DIAGNOSIS — E03.9 HYPOTHYROIDISM, UNSPECIFIED TYPE: ICD-10-CM

## 2021-01-16 LAB
SARS-COV-2 IGG SERPLBLD QL IA.RAPID: NEGATIVE
T3 SERPL-MCNC: 124 NG/DL (ref 60–180)
T4 FREE SERPL-MCNC: 1.12 NG/DL (ref 0.71–1.51)
TSH SERPL DL<=0.005 MIU/L-ACNC: 0.08 UIU/ML (ref 0.4–4)

## 2021-01-16 PROCEDURE — 84480 ASSAY TRIIODOTHYRONINE (T3): CPT

## 2021-01-16 PROCEDURE — 84443 ASSAY THYROID STIM HORMONE: CPT

## 2021-01-16 PROCEDURE — 86769 SARS-COV-2 COVID-19 ANTIBODY: CPT

## 2021-01-16 PROCEDURE — 84439 ASSAY OF FREE THYROXINE: CPT

## 2021-01-16 PROCEDURE — 84432 ASSAY OF THYROGLOBULIN: CPT

## 2021-01-16 PROCEDURE — 36415 COLL VENOUS BLD VENIPUNCTURE: CPT | Mod: PO

## 2021-01-20 LAB
THRYOGLOBULIN INTERPRETATION: ABNORMAL
THYROGLOB AB SERPL-ACNC: 78 IU/ML
THYROGLOB SERPL-MCNC: <0.1 NG/ML

## 2021-01-29 ENCOUNTER — OFFICE VISIT (OUTPATIENT)
Dept: ENDOCRINOLOGY | Facility: CLINIC | Age: 31
End: 2021-01-29
Payer: COMMERCIAL

## 2021-01-29 ENCOUNTER — TELEPHONE (OUTPATIENT)
Dept: SLEEP MEDICINE | Facility: CLINIC | Age: 31
End: 2021-01-29

## 2021-01-29 DIAGNOSIS — C73 THYROID CANCER: ICD-10-CM

## 2021-01-29 DIAGNOSIS — E03.9 HYPOTHYROIDISM, UNSPECIFIED TYPE: Primary | ICD-10-CM

## 2021-01-29 PROCEDURE — 99212 PR OFFICE/OUTPT VISIT, EST, LEVL II, 10-19 MIN: ICD-10-PCS | Mod: 95,,, | Performed by: INTERNAL MEDICINE

## 2021-01-29 PROCEDURE — 99212 OFFICE O/P EST SF 10 MIN: CPT | Mod: 95,,, | Performed by: INTERNAL MEDICINE

## 2021-01-29 RX ORDER — LEVOTHYROXINE SODIUM 112 UG/1
112 TABLET ORAL DAILY
Qty: 90 TABLET | Refills: 3 | Status: SHIPPED | OUTPATIENT
Start: 2021-01-29 | End: 2021-08-09 | Stop reason: SDUPTHER

## 2021-01-29 RX ORDER — LIOTHYRONINE SODIUM 5 UG/1
TABLET ORAL
Qty: 180 TABLET | Refills: 3 | Status: SHIPPED | OUTPATIENT
Start: 2021-01-29 | End: 2021-07-12

## 2021-02-01 ENCOUNTER — PATIENT MESSAGE (OUTPATIENT)
Dept: SLEEP MEDICINE | Facility: CLINIC | Age: 31
End: 2021-02-01

## 2021-02-04 ENCOUNTER — PATIENT MESSAGE (OUTPATIENT)
Dept: ENDOCRINOLOGY | Facility: CLINIC | Age: 31
End: 2021-02-04

## 2021-03-22 ENCOUNTER — TELEPHONE (OUTPATIENT)
Dept: SLEEP MEDICINE | Facility: CLINIC | Age: 31
End: 2021-03-22

## 2021-03-24 ENCOUNTER — OFFICE VISIT (OUTPATIENT)
Dept: SLEEP MEDICINE | Facility: CLINIC | Age: 31
End: 2021-03-24
Payer: COMMERCIAL

## 2021-03-24 DIAGNOSIS — G43.009 MIGRAINE WITHOUT AURA AND WITHOUT STATUS MIGRAINOSUS, NOT INTRACTABLE: ICD-10-CM

## 2021-03-24 PROCEDURE — 99213 PR OFFICE/OUTPT VISIT, EST, LEVL III, 20-29 MIN: ICD-10-PCS | Mod: 95,CR,, | Performed by: NURSE PRACTITIONER

## 2021-03-24 PROCEDURE — 99213 OFFICE O/P EST LOW 20 MIN: CPT | Mod: 95,CR,, | Performed by: NURSE PRACTITIONER

## 2021-03-24 RX ORDER — SUMATRIPTAN SUCCINATE 100 MG/1
100 TABLET ORAL
Qty: 9 TABLET | Refills: 11 | Status: SHIPPED | OUTPATIENT
Start: 2021-03-24 | End: 2022-05-17 | Stop reason: SDUPTHER

## 2021-04-15 ENCOUNTER — PATIENT MESSAGE (OUTPATIENT)
Dept: RESEARCH | Facility: HOSPITAL | Age: 31
End: 2021-04-15

## 2021-05-27 ENCOUNTER — PATIENT MESSAGE (OUTPATIENT)
Dept: ENDOCRINOLOGY | Facility: CLINIC | Age: 31
End: 2021-05-27

## 2021-05-28 ENCOUNTER — PATIENT MESSAGE (OUTPATIENT)
Dept: ENDOCRINOLOGY | Facility: CLINIC | Age: 31
End: 2021-05-28

## 2021-06-15 ENCOUNTER — OFFICE VISIT (OUTPATIENT)
Dept: URGENT CARE | Facility: CLINIC | Age: 31
End: 2021-06-15
Payer: COMMERCIAL

## 2021-06-15 VITALS
RESPIRATION RATE: 14 BRPM | HEART RATE: 75 BPM | TEMPERATURE: 97 F | SYSTOLIC BLOOD PRESSURE: 111 MMHG | DIASTOLIC BLOOD PRESSURE: 72 MMHG | OXYGEN SATURATION: 97 % | HEIGHT: 62 IN | WEIGHT: 110 LBS | BODY MASS INDEX: 20.24 KG/M2

## 2021-06-15 DIAGNOSIS — J02.9 SORE THROAT: Primary | ICD-10-CM

## 2021-06-15 DIAGNOSIS — R52 BODY ACHES: ICD-10-CM

## 2021-06-15 LAB
CTP QC/QA: YES
SARS-COV-2 RDRP RESP QL NAA+PROBE: NEGATIVE

## 2021-06-15 PROCEDURE — 99214 OFFICE O/P EST MOD 30 MIN: CPT | Mod: S$GLB,,, | Performed by: INTERNAL MEDICINE

## 2021-06-15 PROCEDURE — U0002: ICD-10-PCS | Mod: QW,S$GLB,, | Performed by: INTERNAL MEDICINE

## 2021-06-15 PROCEDURE — 99214 PR OFFICE/OUTPT VISIT, EST, LEVL IV, 30-39 MIN: ICD-10-PCS | Mod: S$GLB,,, | Performed by: INTERNAL MEDICINE

## 2021-06-15 PROCEDURE — 3008F BODY MASS INDEX DOCD: CPT | Mod: CPTII,S$GLB,, | Performed by: INTERNAL MEDICINE

## 2021-06-15 PROCEDURE — 3008F PR BODY MASS INDEX (BMI) DOCUMENTED: ICD-10-PCS | Mod: CPTII,S$GLB,, | Performed by: INTERNAL MEDICINE

## 2021-06-15 PROCEDURE — U0002 COVID-19 LAB TEST NON-CDC: HCPCS | Mod: QW,S$GLB,, | Performed by: INTERNAL MEDICINE

## 2021-06-22 ENCOUNTER — CLINICAL SUPPORT (OUTPATIENT)
Dept: URGENT CARE | Facility: CLINIC | Age: 31
End: 2021-06-22
Payer: COMMERCIAL

## 2021-06-22 DIAGNOSIS — Z20.822 EXPOSURE TO COVID-19 VIRUS: Primary | ICD-10-CM

## 2021-06-22 DIAGNOSIS — U07.1 COVID-19 VIRUS DETECTED: ICD-10-CM

## 2021-06-22 LAB
CTP QC/QA: YES
SARS-COV-2 RDRP RESP QL NAA+PROBE: POSITIVE

## 2021-06-22 PROCEDURE — U0002 COVID-19 LAB TEST NON-CDC: HCPCS | Mod: QW,S$GLB,, | Performed by: PHYSICIAN ASSISTANT

## 2021-06-22 PROCEDURE — U0002: ICD-10-PCS | Mod: QW,S$GLB,, | Performed by: PHYSICIAN ASSISTANT

## 2021-06-23 ENCOUNTER — NURSE TRIAGE (OUTPATIENT)
Dept: ADMINISTRATIVE | Facility: CLINIC | Age: 31
End: 2021-06-23

## 2021-06-28 ENCOUNTER — PATIENT MESSAGE (OUTPATIENT)
Dept: ENDOCRINOLOGY | Facility: CLINIC | Age: 31
End: 2021-06-28

## 2021-06-28 DIAGNOSIS — E03.9 HYPOTHYROIDISM, UNSPECIFIED TYPE: Primary | ICD-10-CM

## 2021-06-29 ENCOUNTER — PATIENT MESSAGE (OUTPATIENT)
Dept: ENDOCRINOLOGY | Facility: CLINIC | Age: 31
End: 2021-06-29

## 2021-07-03 ENCOUNTER — LAB VISIT (OUTPATIENT)
Dept: LAB | Facility: HOSPITAL | Age: 31
End: 2021-07-03
Attending: INTERNAL MEDICINE
Payer: COMMERCIAL

## 2021-07-03 DIAGNOSIS — E03.9 HYPOTHYROIDISM, UNSPECIFIED TYPE: ICD-10-CM

## 2021-07-03 LAB
T3 SERPL-MCNC: 115 NG/DL (ref 60–180)
T4 FREE SERPL-MCNC: 0.99 NG/DL (ref 0.71–1.51)
TSH SERPL DL<=0.005 MIU/L-ACNC: 0.05 UIU/ML (ref 0.4–4)

## 2021-07-03 PROCEDURE — 84439 ASSAY OF FREE THYROXINE: CPT | Performed by: INTERNAL MEDICINE

## 2021-07-03 PROCEDURE — 36415 COLL VENOUS BLD VENIPUNCTURE: CPT | Mod: PO | Performed by: INTERNAL MEDICINE

## 2021-07-03 PROCEDURE — 84480 ASSAY TRIIODOTHYRONINE (T3): CPT | Performed by: INTERNAL MEDICINE

## 2021-07-03 PROCEDURE — 84443 ASSAY THYROID STIM HORMONE: CPT | Performed by: INTERNAL MEDICINE

## 2021-07-08 ENCOUNTER — PATIENT MESSAGE (OUTPATIENT)
Dept: ENDOCRINOLOGY | Facility: CLINIC | Age: 31
End: 2021-07-08

## 2021-07-12 ENCOUNTER — PATIENT MESSAGE (OUTPATIENT)
Dept: ENDOCRINOLOGY | Facility: CLINIC | Age: 31
End: 2021-07-12

## 2021-07-12 DIAGNOSIS — E03.9 HYPOTHYROIDISM, UNSPECIFIED TYPE: Primary | ICD-10-CM

## 2021-07-12 RX ORDER — LIOTHYRONINE SODIUM 5 UG/1
TABLET ORAL
Qty: 90 TABLET | Refills: 3 | Status: SHIPPED | OUTPATIENT
Start: 2021-07-12 | End: 2022-10-07

## 2021-08-09 ENCOUNTER — PATIENT MESSAGE (OUTPATIENT)
Dept: ENDOCRINOLOGY | Facility: CLINIC | Age: 31
End: 2021-08-09

## 2021-08-09 RX ORDER — LEVOTHYROXINE SODIUM 112 UG/1
112 TABLET ORAL DAILY
Qty: 30 TABLET | Refills: 3 | Status: SHIPPED | OUTPATIENT
Start: 2021-08-09 | End: 2022-03-30

## 2021-08-12 ENCOUNTER — PATIENT MESSAGE (OUTPATIENT)
Dept: ENDOCRINOLOGY | Facility: CLINIC | Age: 31
End: 2021-08-12

## 2021-08-12 ENCOUNTER — HOSPITAL ENCOUNTER (OUTPATIENT)
Dept: RADIOLOGY | Facility: OTHER | Age: 31
Discharge: HOME | End: 2021-08-12
Attending: INTERNAL MEDICINE
Payer: COMMERCIAL

## 2021-08-12 DIAGNOSIS — Z86.16 HISTORY OF COVID-19: Primary | ICD-10-CM

## 2021-08-12 DIAGNOSIS — E03.9 HYPOTHYROIDISM, UNSPECIFIED TYPE: ICD-10-CM

## 2021-08-12 DIAGNOSIS — C73 THYROID CANCER: ICD-10-CM

## 2021-08-12 PROCEDURE — 76536 US EXAM OF HEAD AND NECK: CPT | Mod: 26,,, | Performed by: INTERNAL MEDICINE

## 2021-08-12 PROCEDURE — 76536 US EXAM OF HEAD AND NECK: CPT | Mod: TC

## 2021-08-12 PROCEDURE — 76536 US SOFT TISSUE HEAD NECK THYROID: ICD-10-PCS | Mod: 26,,, | Performed by: INTERNAL MEDICINE

## 2021-08-13 ENCOUNTER — PATIENT MESSAGE (OUTPATIENT)
Dept: ENDOCRINOLOGY | Facility: CLINIC | Age: 31
End: 2021-08-13

## 2021-08-16 ENCOUNTER — PATIENT MESSAGE (OUTPATIENT)
Dept: ENDOCRINOLOGY | Facility: CLINIC | Age: 31
End: 2021-08-16

## 2021-08-16 DIAGNOSIS — C73 THYROID CANCER: Primary | ICD-10-CM

## 2021-09-14 ENCOUNTER — PATIENT MESSAGE (OUTPATIENT)
Dept: ENDOCRINOLOGY | Facility: CLINIC | Age: 31
End: 2021-09-14

## 2021-12-19 ENCOUNTER — PATIENT MESSAGE (OUTPATIENT)
Dept: ENDOCRINOLOGY | Facility: CLINIC | Age: 31
End: 2021-12-19
Payer: COMMERCIAL

## 2021-12-21 ENCOUNTER — PATIENT MESSAGE (OUTPATIENT)
Dept: ENDOCRINOLOGY | Facility: CLINIC | Age: 31
End: 2021-12-21
Payer: COMMERCIAL

## 2022-02-11 ENCOUNTER — PATIENT MESSAGE (OUTPATIENT)
Dept: ENDOCRINOLOGY | Facility: CLINIC | Age: 32
End: 2022-02-11
Payer: COMMERCIAL

## 2022-02-22 ENCOUNTER — PATIENT MESSAGE (OUTPATIENT)
Dept: RESEARCH | Facility: HOSPITAL | Age: 32
End: 2022-02-22
Payer: COMMERCIAL

## 2022-03-15 ENCOUNTER — PATIENT MESSAGE (OUTPATIENT)
Dept: ENDOCRINOLOGY | Facility: CLINIC | Age: 32
End: 2022-03-15
Payer: COMMERCIAL

## 2022-03-15 NOTE — TELEPHONE ENCOUNTER
We currently have a lab tube shortage, so limited on tests we can order  Not currently recommended to follow ab levels as unclear if any benefit as there are different types of AB.

## 2022-03-19 ENCOUNTER — LAB VISIT (OUTPATIENT)
Dept: LAB | Facility: HOSPITAL | Age: 32
End: 2022-03-19
Attending: INTERNAL MEDICINE
Payer: COMMERCIAL

## 2022-03-19 DIAGNOSIS — C73 THYROID CANCER: ICD-10-CM

## 2022-03-19 LAB — TSH SERPL DL<=0.005 MIU/L-ACNC: 3.83 UIU/ML (ref 0.4–4)

## 2022-03-19 PROCEDURE — 86800 THYROGLOBULIN ANTIBODY: CPT | Performed by: INTERNAL MEDICINE

## 2022-03-19 PROCEDURE — 84443 ASSAY THYROID STIM HORMONE: CPT | Performed by: INTERNAL MEDICINE

## 2022-03-22 LAB
THRYOGLOBULIN INTERPRETATION: ABNORMAL
THYROGLOB AB SERPL-ACNC: 81 IU/ML
THYROGLOB SERPL-MCNC: <0.1 NG/ML

## 2022-03-30 ENCOUNTER — OFFICE VISIT (OUTPATIENT)
Dept: ENDOCRINOLOGY | Facility: CLINIC | Age: 32
End: 2022-03-30
Payer: COMMERCIAL

## 2022-03-30 DIAGNOSIS — C73 THYROID CANCER: Primary | ICD-10-CM

## 2022-03-30 DIAGNOSIS — E03.9 HYPOTHYROIDISM, UNSPECIFIED TYPE: ICD-10-CM

## 2022-03-30 PROCEDURE — 1159F MED LIST DOCD IN RCRD: CPT | Mod: CPTII,95,, | Performed by: INTERNAL MEDICINE

## 2022-03-30 PROCEDURE — 99214 OFFICE O/P EST MOD 30 MIN: CPT | Mod: 95,,, | Performed by: INTERNAL MEDICINE

## 2022-03-30 PROCEDURE — 99214 PR OFFICE/OUTPT VISIT, EST, LEVL IV, 30-39 MIN: ICD-10-PCS | Mod: 95,,, | Performed by: INTERNAL MEDICINE

## 2022-03-30 PROCEDURE — 1159F PR MEDICATION LIST DOCUMENTED IN MEDICAL RECORD: ICD-10-PCS | Mod: CPTII,95,, | Performed by: INTERNAL MEDICINE

## 2022-03-30 PROCEDURE — 1160F PR REVIEW ALL MEDS BY PRESCRIBER/CLIN PHARMACIST DOCUMENTED: ICD-10-PCS | Mod: CPTII,95,, | Performed by: INTERNAL MEDICINE

## 2022-03-30 PROCEDURE — 1160F RVW MEDS BY RX/DR IN RCRD: CPT | Mod: CPTII,95,, | Performed by: INTERNAL MEDICINE

## 2022-03-30 RX ORDER — LEVOTHYROXINE SODIUM 125 UG/1
125 TABLET ORAL
Qty: 30 TABLET | Refills: 11 | Status: SHIPPED | OUTPATIENT
Start: 2022-03-30 | End: 2023-03-24

## 2022-03-30 NOTE — PROGRESS NOTES
The patient location is: [place]    Visit type: audiovisual    Face to Face time with patient: 16  18 minutes of total time spent on the encounter, which includes face to face time and non-face to face time preparing to see the patient (eg, review of tests), Obtaining and/or reviewing separately obtained history, Documenting clinical information in the electronic or other health record, Independently interpreting results (not separately reported) and communicating results to the patient/family/caregiver, or Care coordination (not separately reported).         Each patient to whom he or she provides medical services by telemedicine is:  (1) informed of the relationship between the physician and patient and the respective role of any other health care provider with respect to management of the patient; and (2) notified that he or she may decline to receive medical services by telemedicine and may withdraw from such care at any time.    Notes:       (OB- Dr. Orellana)  CHIEF COMPLAINT: Papillary Thyroid Cancer   31 y.o.  being seen as a f/u. S/P total thyroidectomy 6/13. She received 100 mCi on 9/13 (She received tracer scan prior to determine dose). On synthroid 112 mcg and cytomel 5 mcg 1 tablet daily. States has some increase in migraines. States tends to wake more often. States that gets hot more often. No palpitations. No tremors. No diarrhea or concentration. No further plans on children          FINAL PATHOLOGIC DIAGNOSIS   1. 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   2. THYROID (LEFT LOBE, CLINICAL): NO TUMOR SEEN.   THYROID CANCER CASE SUMMARY   Thyroid gland resection   Procedure: right lobectomy   Specimen integrity: separate right and left lobes   Specimen size: 6.0 cm   Tumor focality: single focus   Tumor laterality: right   Tumor size: 2.0 cm   Histologic type: papillary carcinoma, tall cell variant   Margins: not involved; closest margin < 0.1cm   Tumor capsule: totally  encapsulated   Tumor capsular invasion: minimally invasive   Lymph-vascular invasion: not identified   Extrathyroid extension: not identified   Pathologic stage: I (pT1b NX)     PAST MEDICAL HISTORY/PAST SURGICAL HISTORY: Reviewed in EPIC     SOCIAL HISTORY: No T/A. .     FAMILY HISTORY: No thyroid cancer. Distant relative had hyperthyroidism.     MEDICATIONS/ALLERGIES: The patient's MedCard has been updated and reviewed.       ROS:   Constitutional: weight increased with increased strength training.   Cardiovascular: Denies current anginal symptoms   Respiratory: Denies current respiratory difficulty   Remainder ROS negative       PE: Virtual Visit     Latest Reference Range & Units 03/19/22 09:58 03/19/22 10:01   TSH 0.400 - 4.000 uIU/mL  3.828   Thyroglobulin Interpretation  SEE BELOW [1]    Thyroglobulin Antibody Screen <1.8 IU/mL 81 (H)    Thyroglobulin, Tumor Marker ng/mL <0.1 [2]          ASSESSMENT/PLAN:   1. 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 TG Ab +. With minimal invasion of capsule but within margins. S/P 100 mCi. Post Tx WBS was normal. Her Tg Ab are elevated. 1 year WBS shows no iodine avid disease. AB were increased, so PET scan done 10/14 which was negative. Will continue to monitor Tg Ab. TSH increased which can increase Tg. Will increase synthroid to 125 mcg daily. Stay on cytomel. Taking appropriately.     2. Hypothyroidism- See # 1. Monitor for hyperthyroid symptoms. Check Ultrasound at f/u.     FOLLOWUP:  6 weeks- TSH, Ft4, T3,   F/U 6 months- TSH, Ft4, T3, Tg, thyroid US virtual ok

## 2022-05-17 DIAGNOSIS — G43.009 MIGRAINE WITHOUT AURA AND WITHOUT STATUS MIGRAINOSUS, NOT INTRACTABLE: ICD-10-CM

## 2022-05-17 RX ORDER — SUMATRIPTAN SUCCINATE 100 MG/1
100 TABLET ORAL
Qty: 9 TABLET | Refills: 3 | Status: SHIPPED | OUTPATIENT
Start: 2022-05-17 | End: 2024-02-15 | Stop reason: SINTOL

## 2022-09-16 ENCOUNTER — HOSPITAL ENCOUNTER (OUTPATIENT)
Dept: RADIOLOGY | Facility: OTHER | Age: 32
Discharge: HOME | End: 2022-09-16
Attending: INTERNAL MEDICINE
Payer: COMMERCIAL

## 2022-09-16 DIAGNOSIS — E03.9 HYPOTHYROIDISM, UNSPECIFIED TYPE: ICD-10-CM

## 2022-09-16 DIAGNOSIS — C73 THYROID CANCER: ICD-10-CM

## 2022-09-16 PROCEDURE — 76536 US EXAM OF HEAD AND NECK: CPT | Mod: 26,,, | Performed by: RADIOLOGY

## 2022-09-16 PROCEDURE — 76536 US SOFT TISSUE HEAD NECK THYROID: ICD-10-PCS | Mod: 26,,, | Performed by: RADIOLOGY

## 2022-09-16 PROCEDURE — 76536 US EXAM OF HEAD AND NECK: CPT | Mod: TC

## 2022-09-23 ENCOUNTER — OFFICE VISIT (OUTPATIENT)
Dept: ENDOCRINOLOGY | Facility: CLINIC | Age: 32
End: 2022-09-23
Payer: COMMERCIAL

## 2022-09-23 DIAGNOSIS — C73 THYROID CANCER: Primary | ICD-10-CM

## 2022-09-23 DIAGNOSIS — E03.9 HYPOTHYROIDISM, UNSPECIFIED TYPE: ICD-10-CM

## 2022-09-23 PROCEDURE — 1160F PR REVIEW ALL MEDS BY PRESCRIBER/CLIN PHARMACIST DOCUMENTED: ICD-10-PCS | Mod: CPTII,95,, | Performed by: INTERNAL MEDICINE

## 2022-09-23 PROCEDURE — 1159F MED LIST DOCD IN RCRD: CPT | Mod: CPTII,95,, | Performed by: INTERNAL MEDICINE

## 2022-09-23 PROCEDURE — 1159F PR MEDICATION LIST DOCUMENTED IN MEDICAL RECORD: ICD-10-PCS | Mod: CPTII,95,, | Performed by: INTERNAL MEDICINE

## 2022-09-23 PROCEDURE — 99214 PR OFFICE/OUTPT VISIT, EST, LEVL IV, 30-39 MIN: ICD-10-PCS | Mod: 95,,, | Performed by: INTERNAL MEDICINE

## 2022-09-23 PROCEDURE — 1160F RVW MEDS BY RX/DR IN RCRD: CPT | Mod: CPTII,95,, | Performed by: INTERNAL MEDICINE

## 2022-09-23 PROCEDURE — 99214 OFFICE O/P EST MOD 30 MIN: CPT | Mod: 95,,, | Performed by: INTERNAL MEDICINE

## 2022-09-23 NOTE — PROGRESS NOTES
The patient location is: [place]    Visit type: audiovisual    Face to Face time with patient: 20  23 minutes of total time spent on the encounter, which includes face to face time and non-face to face time preparing to see the patient (eg, review of tests), Obtaining and/or reviewing separately obtained history, Documenting clinical information in the electronic or other health record, Independently interpreting results (not separately reported) and communicating results to the patient/family/caregiver, or Care coordination (not separately reported).         Each patient to whom he or she provides medical services by telemedicine is:  (1) informed of the relationship between the physician and patient and the respective role of any other health care provider with respect to management of the patient; and (2) notified that he or she may decline to receive medical services by telemedicine and may withdraw from such care at any time.    Notes:       (OB- Dr. Orellana)  CHIEF COMPLAINT: Papillary Thyroid Cancer   32 y.o.  being seen as a f/u. S/P total thyroidectomy 6/13. She received 100 mCi on 9/13 (She received tracer scan prior to determine dose). On synthroid 125 mcg and cytomel 5 mcg 1 tablet daily. Still having some migraines. NO palpitations. No tremors. She has cut back on caffeine. Sleeping well. No fatigue.           FINAL PATHOLOGIC DIAGNOSIS   1. 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   2. THYROID (LEFT LOBE, CLINICAL): NO TUMOR SEEN.   THYROID CANCER CASE SUMMARY   Thyroid gland resection   Procedure: right lobectomy   Specimen integrity: separate right and left lobes   Specimen size: 6.0 cm   Tumor focality: single focus   Tumor laterality: right   Tumor size: 2.0 cm   Histologic type: papillary carcinoma, tall cell variant   Margins: not involved; closest margin < 0.1cm   Tumor capsule: totally encapsulated   Tumor capsular invasion: minimally invasive   Lymph-vascular invasion:  not identified   Extrathyroid extension: not identified   Pathologic stage: I (pT1b NX)     PAST MEDICAL HISTORY/PAST SURGICAL HISTORY: Reviewed in [facility]     SOCIAL HISTORY: No T/A. .     FAMILY HISTORY: No thyroid cancer. Distant relative had hyperthyroidism.     MEDICATIONS/ALLERGIES: The patient's MedCard has been updated and reviewed.         PE: Virtual Visit     Latest Reference Range & Units 09/16/22 16:50   TSH 0.400 - 4.000 uIU/mL 1.525   T3, Total 60 - 180 ng/dL 84   Free T4 0.71 - 1.51 ng/dL 0.95   Thyroglobulin Interpretation  SEE BELOW   Thyroglobulin Antibody Screen <1.8 IU/mL 98 (H)   Thyroglobulin, Tumor Marker ng/mL <0.1   (H): Data is abnormally high    US SOFT TISSUE HEAD NECK THYROID     CLINICAL HISTORY:  .  Malignant neoplasm of thyroid gland     TECHNIQUE:  Ultrasound of the thyroid and cervical lymph nodes was performed.     COMPARISON:  08/12/2021.     FINDINGS:  Prior thyroidectomy.  No soft tissue mass identified in the thyroidectomy bed.     No enlarged cervical lymph nodes identified.     Impression:     As above      ASSESSMENT/PLAN:   1. 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 TG Ab +. With minimal invasion of capsule but within margins. S/P 100 mCi. Post Tx WBS was normal. Her Tg Ab are elevated. 1 year WBS shows no iodine avid disease. AB were increased, so PET scan done 10/14 which was negative. Will continue to monitor Tg Ab.  Thyroglobulin antibodies slightly increased from last time but overall significantly lower than before.  Ultrasound shows no worrisome lesions.  Will continue to monitor.  Will repeat ultrasound at follow-up as well.    2. Hypothyroidism- See # 1. Monitor for hyperthyroid symptoms. Check Ultrasound at f/u.  For now continue current treatment.  If TSH increases will adjust dose.    FOLLOWUP:  F/U 6 months- TSH, Ft4, T3, Tg, thyroid US virtual ok

## 2022-09-26 ENCOUNTER — PATIENT MESSAGE (OUTPATIENT)
Dept: ENDOCRINOLOGY | Facility: CLINIC | Age: 32
End: 2022-09-26
Payer: COMMERCIAL

## 2022-09-26 DIAGNOSIS — C73 THYROID CANCER: Primary | ICD-10-CM

## 2022-10-08 ENCOUNTER — PATIENT MESSAGE (OUTPATIENT)
Dept: ENDOCRINOLOGY | Facility: CLINIC | Age: 32
End: 2022-10-08
Payer: COMMERCIAL

## 2022-11-06 ENCOUNTER — PATIENT MESSAGE (OUTPATIENT)
Dept: ENDOCRINOLOGY | Facility: CLINIC | Age: 32
End: 2022-11-06
Payer: COMMERCIAL

## 2023-01-10 ENCOUNTER — PATIENT MESSAGE (OUTPATIENT)
Dept: SLEEP MEDICINE | Facility: CLINIC | Age: 33
End: 2023-01-10
Payer: COMMERCIAL

## 2023-01-13 DIAGNOSIS — G43.009 MIGRAINE WITHOUT AURA AND WITHOUT STATUS MIGRAINOSUS, NOT INTRACTABLE: Primary | ICD-10-CM

## 2023-03-11 ENCOUNTER — HOSPITAL ENCOUNTER (OUTPATIENT)
Dept: RADIOLOGY | Facility: OTHER | Age: 33
Discharge: HOME | End: 2023-03-11
Attending: INTERNAL MEDICINE
Payer: COMMERCIAL

## 2023-03-11 DIAGNOSIS — C73 THYROID CANCER: ICD-10-CM

## 2023-03-11 DIAGNOSIS — E03.9 HYPOTHYROIDISM, UNSPECIFIED TYPE: ICD-10-CM

## 2023-03-11 PROCEDURE — 76536 US SOFT TISSUE HEAD NECK THYROID: ICD-10-PCS | Mod: 26,,, | Performed by: RADIOLOGY

## 2023-03-11 PROCEDURE — 76536 US EXAM OF HEAD AND NECK: CPT | Mod: TC

## 2023-03-11 PROCEDURE — 76536 US EXAM OF HEAD AND NECK: CPT | Mod: 26,,, | Performed by: RADIOLOGY

## 2023-03-24 ENCOUNTER — OFFICE VISIT (OUTPATIENT)
Dept: ENDOCRINOLOGY | Facility: CLINIC | Age: 33
End: 2023-03-24
Payer: COMMERCIAL

## 2023-03-24 DIAGNOSIS — C73 THYROID CANCER: Primary | ICD-10-CM

## 2023-03-24 DIAGNOSIS — E03.9 HYPOTHYROIDISM, UNSPECIFIED TYPE: ICD-10-CM

## 2023-03-24 PROCEDURE — 1159F PR MEDICATION LIST DOCUMENTED IN MEDICAL RECORD: ICD-10-PCS | Mod: CPTII,95,, | Performed by: INTERNAL MEDICINE

## 2023-03-24 PROCEDURE — 1160F PR REVIEW ALL MEDS BY PRESCRIBER/CLIN PHARMACIST DOCUMENTED: ICD-10-PCS | Mod: CPTII,95,, | Performed by: INTERNAL MEDICINE

## 2023-03-24 PROCEDURE — 1159F MED LIST DOCD IN RCRD: CPT | Mod: CPTII,95,, | Performed by: INTERNAL MEDICINE

## 2023-03-24 PROCEDURE — 99214 OFFICE O/P EST MOD 30 MIN: CPT | Mod: 95,,, | Performed by: INTERNAL MEDICINE

## 2023-03-24 PROCEDURE — 99214 PR OFFICE/OUTPT VISIT, EST, LEVL IV, 30-39 MIN: ICD-10-PCS | Mod: 95,,, | Performed by: INTERNAL MEDICINE

## 2023-03-24 PROCEDURE — 1160F RVW MEDS BY RX/DR IN RCRD: CPT | Mod: CPTII,95,, | Performed by: INTERNAL MEDICINE

## 2023-03-24 RX ORDER — LEVOTHYROXINE SODIUM 137 UG/1
137 TABLET ORAL
Qty: 30 TABLET | Refills: 11 | Status: SHIPPED | OUTPATIENT
Start: 2023-03-24 | End: 2024-03-23

## 2023-03-24 NOTE — PROGRESS NOTES
The patient location is: [place]    Visit type: audiovisual    Face to Face time with patient: 24  24 minutes of total time spent on the encounter, which includes face to face time and non-face to face time preparing to see the patient (eg, review of tests), Obtaining and/or reviewing separately obtained history, Documenting clinical information in the electronic or other health record, Independently interpreting results (not separately reported) and communicating results to the patient/family/caregiver, or Care coordination (not separately reported).         Each patient to whom he or she provides medical services by telemedicine is:  (1) informed of the relationship between the physician and patient and the respective role of any other health care provider with respect to management of the patient; and (2) notified that he or she may decline to receive medical services by telemedicine and may withdraw from such care at any time.    Notes:       (OB- Dr. Orellana)  CHIEF COMPLAINT: Papillary Thyroid Cancer   32 y.o.  being seen as a f/u. S/P total thyroidectomy 6/13. She received 100 mCi on 9/13 (She received tracer scan prior to determine dose). On synthroid 125 mcg and cytomel 5 mcg 1 tablet daily. States that since stopped wearing a bite guard migraines improved. NO significant fatigue. Under a lot of stress. Various life events. No palpitations. No tremors. NO diarrhea or constipation. She is exercising and has a .         FINAL PATHOLOGIC DIAGNOSIS   1. 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   2. THYROID (LEFT LOBE, CLINICAL): NO TUMOR SEEN.   THYROID CANCER CASE SUMMARY   Thyroid gland resection   Procedure: right lobectomy   Specimen integrity: separate right and left lobes   Specimen size: 6.0 cm   Tumor focality: single focus   Tumor laterality: right   Tumor size: 2.0 cm   Histologic type: papillary carcinoma, tall cell variant   Margins: not involved; closest margin <  0.1cm   Tumor capsule: totally encapsulated   Tumor capsular invasion: minimally invasive   Lymph-vascular invasion: not identified   Extrathyroid extension: not identified   Pathologic stage: I (pT1b NX)     PAST MEDICAL HISTORY/PAST SURGICAL HISTORY: Reviewed in EPIC     SOCIAL HISTORY: No T/A. .     FAMILY HISTORY: No thyroid cancer. Distant relative had hyperthyroidism.     MEDICATIONS/ALLERGIES: The patient's MedCard has been updated and reviewed.         PE: Virtual Visit     Latest Reference Range & Units 03/11/23 09:20   TSH 0.400 - 4.000 uIU/mL 2.890   T3, Total 60 - 180 ng/dL 86   Free T4 0.71 - 1.51 ng/dL 1.01   Thyroglobulin Interpretation  SEE BELOW   Thyroglobulin Antibody Screen <1.8 IU/mL 110 (H)   Thyroglobulin, Tumor Marker ng/mL <0.1   (H): Data is abnormally high    US SOFT TISSUE HEAD NECK THYROID     CLINICAL HISTORY:  Malignant neoplasm of thyroid gland     TECHNIQUE:  Ultrasound of the thyroid and cervical lymph nodes was performed.     COMPARISON:  09/16/2022     FINDINGS:  Prior thyroidectomy.  No significant residual thyroid tissue.  No suspicious nodule.     No cervical lymph node enlargement identified.     Impression:     Post thyroidectomy with no acute process seen.  No detrimental change from prior.      ASSESSMENT/PLAN:   1. 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 TG Ab +. With minimal invasion of capsule but within margins. S/P 100 mCi. Post Tx WBS was normal.  1 year WBS shows no iodine avid disease. AB were increased, so PET scan done 10/14 which was negative.  Thyroglobulin antibodies have been slowly increasing over time.  No evidence of disease on ultrasound.  Would like to check a thyroglobulin via mass spectrometry to reassess.  If thyroglobulin levels continue to rise and nothing identifiable on ultrasound could consider an empiric dose of radioactive iodine.  A would also like to reassess thyroglobulin with a lower TSH.    2. Hypothyroidism-  See # 1.  Increase Synthroid to 137 mcg daily.  Can stay on Cytomel.  Will get TSH at the lower end of normal.  Reviewed again plans on pregnancy.  Discussed if plans on getting pregnant, will need to get her off Cytomel.    FOLLOWUP:  Tg mass spect and TSH in 6 weeks  F/U 6 months- TSH, Ft4, T3, Tg, virtual ok

## 2023-03-25 ENCOUNTER — PATIENT MESSAGE (OUTPATIENT)
Dept: ENDOCRINOLOGY | Facility: CLINIC | Age: 33
End: 2023-03-25
Payer: COMMERCIAL

## 2023-03-25 ENCOUNTER — PATIENT MESSAGE (OUTPATIENT)
Dept: SLEEP MEDICINE | Facility: CLINIC | Age: 33
End: 2023-03-25
Payer: COMMERCIAL

## 2023-03-30 ENCOUNTER — PATIENT MESSAGE (OUTPATIENT)
Dept: ENDOCRINOLOGY | Facility: CLINIC | Age: 33
End: 2023-03-30
Payer: COMMERCIAL

## 2023-05-22 ENCOUNTER — PATIENT MESSAGE (OUTPATIENT)
Dept: ENDOCRINOLOGY | Facility: CLINIC | Age: 33
End: 2023-05-22
Payer: COMMERCIAL

## 2023-09-06 NOTE — TELEPHONE ENCOUNTER
Addressed in mychart message.   
Let her know that TSH still undetectable. Can you verify not taking any thyroid supplements/iodine supplements/biotin  
Lm advising to call back regarding test results   
No

## 2023-09-23 ENCOUNTER — HOSPITAL ENCOUNTER (OUTPATIENT)
Dept: RADIOLOGY | Facility: OTHER | Age: 33
Discharge: HOME | End: 2023-09-23
Attending: INTERNAL MEDICINE
Payer: COMMERCIAL

## 2023-09-23 DIAGNOSIS — C73 THYROID CANCER: ICD-10-CM

## 2023-09-23 PROCEDURE — 76536 US SOFT TISSUE HEAD NECK THYROID: ICD-10-PCS | Mod: 26,,, | Performed by: RADIOLOGY

## 2023-09-23 PROCEDURE — 76536 US EXAM OF HEAD AND NECK: CPT | Mod: 26,,, | Performed by: RADIOLOGY

## 2023-09-23 PROCEDURE — 76536 US EXAM OF HEAD AND NECK: CPT | Mod: TC

## 2023-09-30 ENCOUNTER — LAB VISIT (OUTPATIENT)
Dept: LAB | Facility: HOSPITAL | Age: 33
End: 2023-09-30
Attending: INTERNAL MEDICINE
Payer: COMMERCIAL

## 2023-09-30 DIAGNOSIS — C73 THYROID CANCER: ICD-10-CM

## 2023-09-30 DIAGNOSIS — E03.9 HYPOTHYROIDISM, UNSPECIFIED TYPE: ICD-10-CM

## 2023-09-30 LAB
T3 SERPL-MCNC: 94 NG/DL (ref 60–180)
T4 FREE SERPL-MCNC: 1.14 NG/DL (ref 0.71–1.51)
TSH SERPL DL<=0.005 MIU/L-ACNC: 2.02 UIU/ML (ref 0.4–4)
TSH SERPL DL<=0.005 MIU/L-ACNC: 2.02 UIU/ML (ref 0.4–4)

## 2023-09-30 PROCEDURE — 84443 ASSAY THYROID STIM HORMONE: CPT | Performed by: INTERNAL MEDICINE

## 2023-09-30 PROCEDURE — 36415 COLL VENOUS BLD VENIPUNCTURE: CPT | Mod: PO | Performed by: INTERNAL MEDICINE

## 2023-09-30 PROCEDURE — 84439 ASSAY OF FREE THYROXINE: CPT | Performed by: INTERNAL MEDICINE

## 2023-09-30 PROCEDURE — 84432 ASSAY OF THYROGLOBULIN: CPT | Performed by: INTERNAL MEDICINE

## 2023-09-30 PROCEDURE — 84480 ASSAY TRIIODOTHYRONINE (T3): CPT | Performed by: INTERNAL MEDICINE

## 2023-09-30 PROCEDURE — 84432 ASSAY OF THYROGLOBULIN: CPT | Mod: 91 | Performed by: INTERNAL MEDICINE

## 2023-10-03 LAB
THRYOGLOBULIN INTERPRETATION: ABNORMAL
THYROGLOB AB SERPL-ACNC: 105 IU/ML
THYROGLOB SERPL-MCNC: <0.1 NG/ML

## 2023-10-06 LAB
CLINICAL BIOCHEMIST REVIEW: NORMAL
THYROGLOB SERPL-MCNC: <0.2 NG/ML

## 2023-10-17 RX ORDER — LIOTHYRONINE SODIUM 5 UG/1
TABLET ORAL
Qty: 90 TABLET | Refills: 3 | Status: SHIPPED | OUTPATIENT
Start: 2023-10-17

## 2023-10-20 ENCOUNTER — OFFICE VISIT (OUTPATIENT)
Dept: ENDOCRINOLOGY | Facility: CLINIC | Age: 33
End: 2023-10-20
Payer: COMMERCIAL

## 2023-10-20 DIAGNOSIS — C73 THYROID CANCER: Primary | ICD-10-CM

## 2023-10-20 DIAGNOSIS — E03.9 HYPOTHYROIDISM, UNSPECIFIED TYPE: ICD-10-CM

## 2023-10-20 PROCEDURE — 1159F PR MEDICATION LIST DOCUMENTED IN MEDICAL RECORD: ICD-10-PCS | Mod: CPTII,95,, | Performed by: INTERNAL MEDICINE

## 2023-10-20 PROCEDURE — 1160F PR REVIEW ALL MEDS BY PRESCRIBER/CLIN PHARMACIST DOCUMENTED: ICD-10-PCS | Mod: CPTII,95,, | Performed by: INTERNAL MEDICINE

## 2023-10-20 PROCEDURE — 1160F RVW MEDS BY RX/DR IN RCRD: CPT | Mod: CPTII,95,, | Performed by: INTERNAL MEDICINE

## 2023-10-20 PROCEDURE — 1159F MED LIST DOCD IN RCRD: CPT | Mod: CPTII,95,, | Performed by: INTERNAL MEDICINE

## 2023-10-20 PROCEDURE — 99214 OFFICE O/P EST MOD 30 MIN: CPT | Mod: 95,,, | Performed by: INTERNAL MEDICINE

## 2023-10-20 PROCEDURE — 99214 PR OFFICE/OUTPT VISIT, EST, LEVL IV, 30-39 MIN: ICD-10-PCS | Mod: 95,,, | Performed by: INTERNAL MEDICINE

## 2023-10-20 NOTE — PROGRESS NOTES
The patient location is: [place]    Visit type: audiovisual    Face to Face time with patient: 20  25 minutes of total time spent on the encounter, which includes face to face time and non-face to face time preparing to see the patient (eg, review of tests), Obtaining and/or reviewing separately obtained history, Documenting clinical information in the electronic or other health record, Independently interpreting results (not separately reported) and communicating results to the patient/family/caregiver, or Care coordination (not separately reported).         Each patient to whom he or she provides medical services by telemedicine is:  (1) informed of the relationship between the physician and patient and the respective role of any other health care provider with respect to management of the patient; and (2) notified that he or she may decline to receive medical services by telemedicine and may withdraw from such care at any time.    Notes:       (OB- Dr. Orellana)  CHIEF COMPLAINT: Papillary Thyroid Cancer   33 y.o.  being seen as a f/u. S/P total thyroidectomy 6/13. She received 100 mCi on 9/13 (She received tracer scan prior to determine dose). On synthroid 137 mcg and cytomel 5 mcg 1 tablet daily. Fatigue doing well. Trying to prioritize sleep. Some of the stress that she was having before has been better. When wearing bite guard migraines improved. No palpitations. No tremors. No diarrhea or constipation. Exercising regularly.           FINAL PATHOLOGIC DIAGNOSIS   1. 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   2. THYROID (LEFT LOBE, CLINICAL): NO TUMOR SEEN.   THYROID CANCER CASE SUMMARY   Thyroid gland resection   Procedure: right lobectomy   Specimen integrity: separate right and left lobes   Specimen size: 6.0 cm   Tumor focality: single focus   Tumor laterality: right   Tumor size: 2.0 cm   Histologic type: papillary carcinoma, tall cell variant   Margins: not involved; closest margin  < 0.1cm   Tumor capsule: totally encapsulated   Tumor capsular invasion: minimally invasive   Lymph-vascular invasion: not identified   Extrathyroid extension: not identified   Pathologic stage: I (pT1b NX)     PAST MEDICAL HISTORY/PAST SURGICAL HISTORY: Reviewed in [facility]     SOCIAL HISTORY: No T/A. .     FAMILY HISTORY: No thyroid cancer. Distant relative had hyperthyroidism.     MEDICATIONS/ALLERGIES: The patient's MedCard has been updated and reviewed.         PE: Virtual Visit       Latest Reference Range & Units 09/30/23 08:23   Thyroglobulin Interpretation  SEE BELOW   Thyroglobulin, LC/MS/MS ng/mL <0.2   TSH 0.400 - 4.000 uIU/mL  0.400 - 4.000 uIU/mL 2.021  2.021   T3, Total 60 - 180 ng/dL 94   Free T4 0.71 - 1.51 ng/dL 1.14   Thyroglobulin Interpretation  SEE BELOW   Thyroglobulin Antibody Screen <1.8 IU/mL 105 (H)   Thyroglobulin, Tumor Marker ng/mL <0.1   (H): Data is abnormally high    US SOFT TISSUE HEAD NECK THYROID     CLINICAL HISTORY:  Malignant neoplasm of thyroid gland     TECHNIQUE:  Ultrasound of the thyroid and cervical lymph nodes was performed.     COMPARISON:  03/11/2023     FINDINGS:  Prior thyroidectomy.  No suspicious nodule in the thyroid fossa.     No cervical lymph node enlargement on provided images.     Impression:     Please see above.      ASSESSMENT/PLAN:   1. 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 TG Ab +. With minimal invasion of capsule but within margins. S/P 100 mCi. Post Tx WBS was normal.  1 year WBS shows no iodine avid disease. AB were increased, so PET scan done 10/14 which was negative.  Thyroglobulin antibodies have been slowly increasing over time. Currently relatively stable.  No evidence of disease on ultrasound.  Check TG via mass spect which was undetectable. Will repeat in 6 months      2. Hypothyroidism- See # 1. Symptomatically doing well. Continue current Tx.     FOLLOWUP:  F/U 6 months- TSH, Ft4, T3, Tg, virtual ok

## 2023-12-21 ENCOUNTER — PATIENT MESSAGE (OUTPATIENT)
Dept: SLEEP MEDICINE | Facility: CLINIC | Age: 33
End: 2023-12-21
Payer: COMMERCIAL

## 2023-12-21 ENCOUNTER — TELEPHONE (OUTPATIENT)
Dept: SLEEP MEDICINE | Facility: CLINIC | Age: 33
End: 2023-12-21
Payer: COMMERCIAL

## 2023-12-21 DIAGNOSIS — G43.009 MIGRAINE WITHOUT AURA AND WITHOUT STATUS MIGRAINOSUS, NOT INTRACTABLE: ICD-10-CM

## 2024-02-15 ENCOUNTER — PATIENT MESSAGE (OUTPATIENT)
Dept: ENDOCRINOLOGY | Facility: CLINIC | Age: 34
End: 2024-02-15
Payer: COMMERCIAL

## 2024-02-15 ENCOUNTER — OFFICE VISIT (OUTPATIENT)
Dept: SLEEP MEDICINE | Facility: CLINIC | Age: 34
End: 2024-02-15
Payer: COMMERCIAL

## 2024-02-15 DIAGNOSIS — G43.009 MIGRAINE WITHOUT AURA AND WITHOUT STATUS MIGRAINOSUS, NOT INTRACTABLE: Primary | ICD-10-CM

## 2024-02-15 PROCEDURE — 99214 OFFICE O/P EST MOD 30 MIN: CPT | Mod: 95,,, | Performed by: NURSE PRACTITIONER

## 2024-02-15 RX ORDER — ATOGEPANT 60 MG/1
60 TABLET ORAL DAILY
Qty: 30 TABLET | Refills: 5 | Status: SHIPPED | OUTPATIENT
Start: 2024-02-15

## 2024-02-15 NOTE — PROGRESS NOTES
The patient location is: [place]  The chief complaint leading to consultation is: headache mgt    Visit type: TELE AUDIOVISUAL:[id number]  Face to Face time with patient: 20minutes of total time spent on the encounter, which includes face to face time and non-face to face time preparing to see the patient (eg, review of tests), Obtaining and/or reviewing separately obtained history, Documenting clinical information in the electronic or other health record, Independently interpreting results (not separately reported) and communicating results to the patient/family/caregiver, or Care coordination (not separately reported). Each patient to whom he or she provides medical services by telemedicine is:  (1) informed of the relationship between the physician and patient and the respective role of any other health care provider with respect to management of the patient; and (2) notified that he or she may decline to receive medical services by telemedicine and may withdraw from such care at any time.    She continues to have ~ 4-7 headaches monthly around ovulation and period. Ubrelvy helps. Interested in Qulipta as preventive. Same HA nature/location as below. When turns head she gets brief few sharp pain same area as headache, occipital area. Dislikes side effects from Imitrex.       HX [id number] VB:  29/F with chronic headaches since late teens. Pain describes as dull ache always been R occipital area radiating forward to behind her OD with dizziness, nausea, neck tightness and photo/phonophobia. Sometimes left side but this will not be as intense and 2 advil can improve this one.  No consistent triggers other than stress/let down . She denies acute neurological or unilateral autonomic deficit, aura or prodromal symptoms. Activity can worsen pain, rest improve. Occurs 1x/month lasting 2-3d. Excedrin migraine helps lessen intensity. Cold/heat/rest/massage/basil aromatherapy can be helpful. Has to have someone care for her baby at  times when she has a migraine. Hard to get anything done around house due to fatigue. Taking 1000mg Mag qam as preventive overall.       SH: , business     IMPRESSION:   Chronic migraines w/o aura, non-intractable    PLAN   Begin qulipta 60mg qd  Ubrelvy 100mg po q2hr prn  Rtc 3mos

## 2024-04-02 ENCOUNTER — PATIENT MESSAGE (OUTPATIENT)
Dept: SLEEP MEDICINE | Facility: CLINIC | Age: 34
End: 2024-04-02
Payer: COMMERCIAL

## 2024-04-02 DIAGNOSIS — G43.009 MIGRAINE WITHOUT AURA AND WITHOUT STATUS MIGRAINOSUS, NOT INTRACTABLE: ICD-10-CM

## 2024-04-08 ENCOUNTER — PATIENT MESSAGE (OUTPATIENT)
Dept: ENDOCRINOLOGY | Facility: CLINIC | Age: 34
End: 2024-04-08
Payer: COMMERCIAL

## 2024-04-08 RX ORDER — LEVOTHYROXINE SODIUM 137 UG/1
137 TABLET ORAL
Qty: 30 TABLET | Refills: 11 | Status: SHIPPED | OUTPATIENT
Start: 2024-04-08

## 2024-04-08 RX ORDER — LEVOTHYROXINE SODIUM 137 UG/1
137 TABLET ORAL
Qty: 30 TABLET | Refills: 11 | Status: SHIPPED | OUTPATIENT
Start: 2024-04-08 | End: 2024-04-08 | Stop reason: SDUPTHER

## 2024-04-08 NOTE — TELEPHONE ENCOUNTER
Did the pharmacy say it was denied? I did not deny  I approved the refill this morning   Resent synthroid

## 2024-04-25 ENCOUNTER — LAB VISIT (OUTPATIENT)
Dept: LAB | Facility: HOSPITAL | Age: 34
End: 2024-04-25
Attending: INTERNAL MEDICINE
Payer: COMMERCIAL

## 2024-04-25 DIAGNOSIS — E03.9 HYPOTHYROIDISM, UNSPECIFIED TYPE: ICD-10-CM

## 2024-04-25 DIAGNOSIS — C73 THYROID CANCER: ICD-10-CM

## 2024-04-25 LAB
T3 SERPL-MCNC: 93 NG/DL (ref 60–180)
T4 FREE SERPL-MCNC: 1.3 NG/DL (ref 0.71–1.51)
TSH SERPL DL<=0.005 MIU/L-ACNC: 0.24 UIU/ML (ref 0.4–4)

## 2024-04-25 PROCEDURE — 84439 ASSAY OF FREE THYROXINE: CPT | Performed by: INTERNAL MEDICINE

## 2024-04-25 PROCEDURE — 86800 THYROGLOBULIN ANTIBODY: CPT | Performed by: INTERNAL MEDICINE

## 2024-04-25 PROCEDURE — 84443 ASSAY THYROID STIM HORMONE: CPT | Performed by: INTERNAL MEDICINE

## 2024-04-25 PROCEDURE — 84480 ASSAY TRIIODOTHYRONINE (T3): CPT | Performed by: INTERNAL MEDICINE

## 2024-04-26 ENCOUNTER — OFFICE VISIT (OUTPATIENT)
Dept: ENDOCRINOLOGY | Facility: CLINIC | Age: 34
End: 2024-04-26
Payer: COMMERCIAL

## 2024-04-26 ENCOUNTER — PATIENT MESSAGE (OUTPATIENT)
Dept: ENDOCRINOLOGY | Facility: CLINIC | Age: 34
End: 2024-04-26

## 2024-04-26 DIAGNOSIS — C73 THYROID CANCER: Primary | ICD-10-CM

## 2024-04-26 DIAGNOSIS — R53.83 FATIGUE, UNSPECIFIED TYPE: ICD-10-CM

## 2024-04-26 DIAGNOSIS — E03.9 HYPOTHYROIDISM, UNSPECIFIED TYPE: ICD-10-CM

## 2024-04-26 LAB
THRYOGLOBULIN INTERPRETATION: ABNORMAL
THYROGLOB AB SERPL-ACNC: 97 IU/ML
THYROGLOB SERPL-MCNC: <0.1 NG/ML

## 2024-04-26 PROCEDURE — 99214 OFFICE O/P EST MOD 30 MIN: CPT | Mod: 95,,, | Performed by: INTERNAL MEDICINE

## 2024-04-26 PROCEDURE — 1160F RVW MEDS BY RX/DR IN RCRD: CPT | Mod: CPTII,95,, | Performed by: INTERNAL MEDICINE

## 2024-04-26 PROCEDURE — 1159F MED LIST DOCD IN RCRD: CPT | Mod: CPTII,95,, | Performed by: INTERNAL MEDICINE

## 2024-04-26 NOTE — PROGRESS NOTES
The patient location is: [place]    Visit type: audiovisual    Face to Face time with patient: 18  18 minutes of total time spent on the encounter, which includes face to face time and non-face to face time preparing to see the patient (eg, review of tests), Obtaining and/or reviewing separately obtained history, Documenting clinical information in the electronic or other health record, Independently interpreting results (not separately reported) and communicating results to the patient/family/caregiver, or Care coordination (not separately reported).         Each patient to whom he or she provides medical services by telemedicine is:  (1) informed of the relationship between the physician and patient and the respective role of any other health care provider with respect to management of the patient; and (2) notified that he or she may decline to receive medical services by telemedicine and may withdraw from such care at any time.    Notes:       (OB- Dr. Orellana)  CHIEF COMPLAINT: Papillary Thyroid Cancer   34 y.o.  being seen as a f/u. S/P total thyroidectomy 6/13. She received 100 mCi on 9/13 (She received tracer scan prior to determine dose). On synthroid 137 mcg and cytomel 5 mcg 1 tablet daily. Has some fatigue. Mainly in evenings. Sleeping 8-9 hours of sleep. No Palpitations. NO diarrhea. Migraines around cycle. No increase from normal. Using a  3/week. Tracking fluid intake.           FINAL PATHOLOGIC DIAGNOSIS   1. 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   2. THYROID (LEFT LOBE, CLINICAL): NO TUMOR SEEN.   THYROID CANCER CASE SUMMARY   Thyroid gland resection   Procedure: right lobectomy   Specimen integrity: separate right and left lobes   Specimen size: 6.0 cm   Tumor focality: single focus   Tumor laterality: right   Tumor size: 2.0 cm   Histologic type: papillary carcinoma, tall cell variant   Margins: not involved; closest margin < 0.1cm   Tumor capsule: totally  encapsulated   Tumor capsular invasion: minimally invasive   Lymph-vascular invasion: not identified   Extrathyroid extension: not identified   Pathologic stage: I (pT1b NX)     PAST MEDICAL HISTORY/PAST SURGICAL HISTORY: Reviewed in EPIC     SOCIAL HISTORY: No T/A. .     FAMILY HISTORY: No thyroid cancer. Distant relative had hyperthyroidism.     MEDICATIONS/ALLERGIES: The patient's MedCard has been updated and reviewed.         PE: Virtual Visit       Latest Reference Range & Units 04/25/24 10:50   TSH 0.400 - 4.000 uIU/mL 0.239 (L)   T3, Total 60 - 180 ng/dL 93   Free T4 0.71 - 1.51 ng/dL 1.30   Thyroglobulin Interpretation  SEE BELOW   Thyroglobulin Antibody Screen <1.8 IU/mL 97 (H)   Thyroglobulin, Tumor Marker ng/mL <0.1   (L): Data is abnormally low  (H): Data is abnormally high      ASSESSMENT/PLAN:   1. 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 TG Ab +. With minimal invasion of capsule but within margins. S/P 100 mCi. Post Tx WBS was normal.  1 year WBS shows no iodine avid disease. AB were increased, so PET scan done 10/14 which was negative.  Thyroglobulin antibodies have been slowly increasing over time. Currently relatively stable.  No evidence of disease on ultrasound.  Check TG via mass spect which was undetectable. Will repeat in 6 months      2. Hypothyroidism- See # 1. Symptomatically doing well.  Has mild TSH suppression.  Continue to monitor now.  3.  Fatigue-see workup as seen below.  Reviewed sleep.  Discussed keeping a diary see if any correlation between fatigue  and other things in her life such as- stress, sleep, diet, etc..    FOLLOWUP: [facility] on [address].   Needs CMP, CBC, B 12, Iron  F/U 6 months- TSH, Ft4, T3, Tg, THyroid Ultrasound, virtual ok

## 2024-04-29 ENCOUNTER — PATIENT MESSAGE (OUTPATIENT)
Dept: ENDOCRINOLOGY | Facility: CLINIC | Age: 34
End: 2024-04-29
Payer: COMMERCIAL

## 2024-04-30 NOTE — TELEPHONE ENCOUNTER
Not too familiar with homocysteine.   We dont have a metabolic risk panel as an orderable test, so not exactly sure what that is

## 2024-05-15 ENCOUNTER — OFFICE VISIT (OUTPATIENT)
Dept: SLEEP MEDICINE | Facility: CLINIC | Age: 34
End: 2024-05-15
Payer: COMMERCIAL

## 2024-05-15 DIAGNOSIS — G43.009 MIGRAINE WITHOUT AURA AND WITHOUT STATUS MIGRAINOSUS, NOT INTRACTABLE: ICD-10-CM

## 2024-05-15 PROCEDURE — 99214 OFFICE O/P EST MOD 30 MIN: CPT | Mod: 95,,, | Performed by: NURSE PRACTITIONER

## 2024-05-15 NOTE — PROGRESS NOTES
The patient location is: [place]  The chief complaint leading to consultation is: headache mgt    Visit type: TELE AUDIOVISUAL:89307  Face to Face time with patient: 15minutes of total time spent on the encounter, which includes face to face time and non-face to face time preparing to see the patient (eg, review of tests), Obtaining and/or reviewing separately obtained history, Documenting clinical information in the electronic or other health record, Independently interpreting results (not separately reported) and communicating results to the patient/family/caregiver, or Care coordination (not separately reported). Each patient to whom he or she provides medical services by telemedicine is:  (1) informed of the relationship between the physician and patient and the respective role of any other health care provider with respect to management of the patient; and (2) notified that he or she may decline to receive medical services by telemedicine and may withdraw from such care at any time.    She filled qulipta but not started yet. Tracking headaches better and having less frequent and less intense episodes. 6 in March (only 2 lasted couple days) and not sure how many April. Had noticed them occurring around ovulation and period. Ubrelvy helps prn. Same HA nature/location as below. Dislikes side effects from Imitrex.       HX [id number]/[id number] VB:  29/F with chronic headaches since late teens. Pain describes as dull ache always been R occipital area radiating forward to behind her OD with dizziness, nausea, neck tightness and photo/phonophobia. Sometimes left side but this will not be as intense and 2 advil can improve this one.  No consistent triggers other than stress/let down . She denies acute neurological or unilateral autonomic deficit, aura or prodromal symptoms. Activity can worsen pain, rest improve. Occurs 1x/month lasting 2-3d. Excedrin migraine helps lessen intensity. Cold/heat/rest/massage/basil aromatherapy can be  helpful. Has to have someone care for her baby at times when she has a migraine. Hard to get anything done around house due to fatigue. Taking 1000mg Mag qam as preventive overall.   2/2024: She continues to have ~ 4-7 headaches monthly around ovulation and period. Ubrelvy helps. Interested in Qulipta as preventive. Same HA nature/location as below. When turns head she gets brief few sharp pain same area as headache, occipital area. Dislikes side effects from Imitrex.     SH: , business     IMPRESSION:   Chronic migraines w/o aura, non-intractable  Intolerable to triptans    PLAN   Consider starting qulipta 60mg qd- notify me  Ubrelvy 100mg po q2hr prn continue, request 90d supply  Rtc accordingly

## 2024-06-03 ENCOUNTER — PATIENT MESSAGE (OUTPATIENT)
Dept: ENDOCRINOLOGY | Facility: CLINIC | Age: 34
End: 2024-06-03
Payer: COMMERCIAL

## 2024-07-11 ENCOUNTER — PATIENT MESSAGE (OUTPATIENT)
Dept: ENDOCRINOLOGY | Facility: CLINIC | Age: 34
End: 2024-07-11
Payer: COMMERCIAL

## 2024-07-11 ENCOUNTER — PATIENT MESSAGE (OUTPATIENT)
Dept: SLEEP MEDICINE | Facility: CLINIC | Age: 34
End: 2024-07-11
Payer: COMMERCIAL

## 2024-07-11 DIAGNOSIS — M54.2 CERVICALGIA: ICD-10-CM

## 2024-07-11 DIAGNOSIS — G47.30 SLEEP APNEA, UNSPECIFIED TYPE: Primary | ICD-10-CM

## 2024-07-11 DIAGNOSIS — E03.9 HYPOTHYROIDISM, UNSPECIFIED TYPE: Primary | ICD-10-CM

## 2024-07-11 RX ORDER — TIZANIDINE 2 MG/1
2-4 TABLET ORAL NIGHTLY PRN
Qty: 30 TABLET | Refills: 1 | Status: SHIPPED | OUTPATIENT
Start: 2024-07-11 | End: 2024-08-10

## 2024-07-12 ENCOUNTER — LAB VISIT (OUTPATIENT)
Dept: LAB | Facility: HOSPITAL | Age: 34
End: 2024-07-12
Attending: INTERNAL MEDICINE
Payer: COMMERCIAL

## 2024-07-12 DIAGNOSIS — E03.9 HYPOTHYROIDISM, UNSPECIFIED TYPE: ICD-10-CM

## 2024-07-12 DIAGNOSIS — C73 THYROID CANCER: ICD-10-CM

## 2024-07-12 DIAGNOSIS — R53.83 FATIGUE, UNSPECIFIED TYPE: ICD-10-CM

## 2024-07-12 PROCEDURE — 36415 COLL VENOUS BLD VENIPUNCTURE: CPT | Mod: PO | Performed by: INTERNAL MEDICINE

## 2024-07-12 PROCEDURE — 84439 ASSAY OF FREE THYROXINE: CPT | Performed by: INTERNAL MEDICINE

## 2024-07-12 PROCEDURE — 85027 COMPLETE CBC AUTOMATED: CPT | Performed by: INTERNAL MEDICINE

## 2024-07-12 PROCEDURE — 84480 ASSAY TRIIODOTHYRONINE (T3): CPT | Performed by: INTERNAL MEDICINE

## 2024-07-12 PROCEDURE — 82607 VITAMIN B-12: CPT | Performed by: INTERNAL MEDICINE

## 2024-07-12 PROCEDURE — 84466 ASSAY OF TRANSFERRIN: CPT | Performed by: INTERNAL MEDICINE

## 2024-07-12 PROCEDURE — 80053 COMPREHEN METABOLIC PANEL: CPT | Performed by: INTERNAL MEDICINE

## 2024-07-12 PROCEDURE — 84443 ASSAY THYROID STIM HORMONE: CPT | Performed by: INTERNAL MEDICINE

## 2024-07-13 LAB
ALBUMIN SERPL BCP-MCNC: 4.1 G/DL (ref 3.5–5.2)
ALP SERPL-CCNC: 39 U/L (ref 55–135)
ALT SERPL W/O P-5'-P-CCNC: 18 U/L (ref 10–44)
ANION GAP SERPL CALC-SCNC: 8 MMOL/L (ref 8–16)
AST SERPL-CCNC: 20 U/L (ref 10–40)
BILIRUB SERPL-MCNC: 0.5 MG/DL (ref 0.1–1)
BUN SERPL-MCNC: 18 MG/DL (ref 6–20)
CALCIUM SERPL-MCNC: 8.9 MG/DL (ref 8.7–10.5)
CHLORIDE SERPL-SCNC: 103 MMOL/L (ref 95–110)
CO2 SERPL-SCNC: 24 MMOL/L (ref 23–29)
CREAT SERPL-MCNC: 0.8 MG/DL (ref 0.5–1.4)
ERYTHROCYTE [DISTWIDTH] IN BLOOD BY AUTOMATED COUNT: 12.5 % (ref 11.5–14.5)
EST. GFR  (NO RACE VARIABLE): >60 ML/MIN/1.73 M^2
GLUCOSE SERPL-MCNC: 72 MG/DL (ref 70–110)
HCT VFR BLD AUTO: 41.9 % (ref 37–48.5)
HGB BLD-MCNC: 13.7 G/DL (ref 12–16)
IRON SERPL-MCNC: 121 UG/DL (ref 30–160)
MCH RBC QN AUTO: 31.3 PG (ref 27–31)
MCHC RBC AUTO-ENTMCNC: 32.7 G/DL (ref 32–36)
MCV RBC AUTO: 96 FL (ref 82–98)
PLATELET # BLD AUTO: 261 K/UL (ref 150–450)
PMV BLD AUTO: 10.2 FL (ref 9.2–12.9)
POTASSIUM SERPL-SCNC: 4.1 MMOL/L (ref 3.5–5.1)
PROT SERPL-MCNC: 7.4 G/DL (ref 6–8.4)
RBC # BLD AUTO: 4.38 M/UL (ref 4–5.4)
SATURATED IRON: 40 % (ref 20–50)
SODIUM SERPL-SCNC: 135 MMOL/L (ref 136–145)
T3 SERPL-MCNC: 90 NG/DL (ref 60–180)
T4 FREE SERPL-MCNC: 1.1 NG/DL (ref 0.71–1.51)
TOTAL IRON BINDING CAPACITY: 299 UG/DL (ref 250–450)
TRANSFERRIN SERPL-MCNC: 202 MG/DL (ref 200–375)
TSH SERPL DL<=0.005 MIU/L-ACNC: 0.63 UIU/ML (ref 0.4–4)
VIT B12 SERPL-MCNC: 1138 PG/ML (ref 210–950)
WBC # BLD AUTO: 8.52 K/UL (ref 3.9–12.7)

## 2024-07-14 ENCOUNTER — PATIENT MESSAGE (OUTPATIENT)
Dept: ENDOCRINOLOGY | Facility: CLINIC | Age: 34
End: 2024-07-14
Payer: COMMERCIAL

## 2024-07-14 DIAGNOSIS — C73 THYROID CANCER: ICD-10-CM

## 2024-07-14 DIAGNOSIS — E03.9 HYPOTHYROIDISM, UNSPECIFIED TYPE: Primary | ICD-10-CM

## 2024-07-17 ENCOUNTER — TELEPHONE (OUTPATIENT)
Dept: SLEEP MEDICINE | Facility: OTHER | Age: 34
End: 2024-07-17
Payer: COMMERCIAL

## 2024-07-24 ENCOUNTER — HOSPITAL ENCOUNTER (OUTPATIENT)
Dept: SLEEP MEDICINE | Facility: OTHER | Age: 34
Discharge: HOME | End: 2024-07-24
Attending: NURSE PRACTITIONER
Payer: COMMERCIAL

## 2024-07-24 DIAGNOSIS — G47.30 SLEEP APNEA, UNSPECIFIED TYPE: ICD-10-CM

## 2024-07-24 PROCEDURE — 95800 SLP STDY UNATTENDED: CPT

## 2024-07-25 PROBLEM — G47.30 SLEEP APNEA: Status: ACTIVE | Noted: 2024-07-25

## 2024-07-26 PROCEDURE — 95800 SLP STDY UNATTENDED: CPT | Mod: 26,,, | Performed by: INTERNAL MEDICINE

## 2024-07-30 ENCOUNTER — PATIENT MESSAGE (OUTPATIENT)
Dept: SLEEP MEDICINE | Facility: CLINIC | Age: 34
End: 2024-07-30
Payer: COMMERCIAL

## 2024-08-13 ENCOUNTER — PATIENT MESSAGE (OUTPATIENT)
Dept: ENDOCRINOLOGY | Facility: CLINIC | Age: 34
End: 2024-08-13
Payer: COMMERCIAL

## 2024-08-13 ENCOUNTER — LAB VISIT (OUTPATIENT)
Dept: LAB | Facility: HOSPITAL | Age: 34
End: 2024-08-13
Payer: COMMERCIAL

## 2024-08-13 DIAGNOSIS — R53.83 FATIGUE, UNSPECIFIED TYPE: ICD-10-CM

## 2024-08-13 DIAGNOSIS — E03.9 HYPOTHYROIDISM, UNSPECIFIED TYPE: ICD-10-CM

## 2024-08-13 DIAGNOSIS — C73 THYROID CANCER: ICD-10-CM

## 2024-08-13 LAB
ALBUMIN SERPL BCP-MCNC: 4 G/DL (ref 3.5–5.2)
ALP SERPL-CCNC: 40 U/L (ref 55–135)
ALT SERPL W/O P-5'-P-CCNC: 14 U/L (ref 10–44)
ANION GAP SERPL CALC-SCNC: 10 MMOL/L (ref 8–16)
AST SERPL-CCNC: 17 U/L (ref 10–40)
BILIRUB SERPL-MCNC: 0.5 MG/DL (ref 0.1–1)
BUN SERPL-MCNC: 21 MG/DL (ref 6–20)
CALCIUM SERPL-MCNC: 9.1 MG/DL (ref 8.7–10.5)
CHLORIDE SERPL-SCNC: 104 MMOL/L (ref 95–110)
CO2 SERPL-SCNC: 22 MMOL/L (ref 23–29)
CREAT SERPL-MCNC: 0.8 MG/DL (ref 0.5–1.4)
EST. GFR  (NO RACE VARIABLE): >60 ML/MIN/1.73 M^2
GLUCOSE SERPL-MCNC: 87 MG/DL (ref 70–110)
POTASSIUM SERPL-SCNC: 4.1 MMOL/L (ref 3.5–5.1)
PROT SERPL-MCNC: 7.3 G/DL (ref 6–8.4)
SODIUM SERPL-SCNC: 136 MMOL/L (ref 136–145)
T3 SERPL-MCNC: 99 NG/DL (ref 60–180)
T4 FREE SERPL-MCNC: 1 NG/DL (ref 0.71–1.51)
TSH SERPL DL<=0.005 MIU/L-ACNC: 1.1 UIU/ML (ref 0.4–4)

## 2024-08-13 PROCEDURE — 84480 ASSAY TRIIODOTHYRONINE (T3): CPT | Performed by: INTERNAL MEDICINE

## 2024-08-13 PROCEDURE — 84439 ASSAY OF FREE THYROXINE: CPT | Performed by: INTERNAL MEDICINE

## 2024-08-13 PROCEDURE — 84443 ASSAY THYROID STIM HORMONE: CPT | Performed by: INTERNAL MEDICINE

## 2024-08-13 PROCEDURE — 80053 COMPREHEN METABOLIC PANEL: CPT | Performed by: INTERNAL MEDICINE

## 2024-08-13 PROCEDURE — 84432 ASSAY OF THYROGLOBULIN: CPT | Performed by: INTERNAL MEDICINE

## 2024-08-14 LAB
THRYOGLOBULIN INTERPRETATION: ABNORMAL
THYROGLOB AB SERPL-ACNC: 113 IU/ML
THYROGLOB SERPL-MCNC: <0.1 NG/ML

## 2024-08-14 NOTE — TELEPHONE ENCOUNTER
Elevated BUN can be a signal for mild hydration. May be good to make sure primary checking at her annual physical.   Didn't check B 12. Might good to run by PCP as well. Usually not a significant issue if B 12 is a little high. THe only thing is to see if any B12 supplements in anything she is taking.

## 2024-08-15 ENCOUNTER — TELEPHONE (OUTPATIENT)
Dept: ENDOCRINOLOGY | Facility: CLINIC | Age: 34
End: 2024-08-15
Payer: COMMERCIAL

## 2024-08-15 DIAGNOSIS — C73 THYROID CANCER: Primary | ICD-10-CM

## 2024-08-15 NOTE — TELEPHONE ENCOUNTER
The Tg Ab are slightly increased  Would like to do a different type of Tg called TG via mass spec which can be helpful if Tg ab are positive

## 2024-08-16 ENCOUNTER — LAB VISIT (OUTPATIENT)
Dept: LAB | Facility: HOSPITAL | Age: 34
End: 2024-08-16
Attending: INTERNAL MEDICINE
Payer: COMMERCIAL

## 2024-08-16 DIAGNOSIS — C73 THYROID CANCER: ICD-10-CM

## 2024-08-16 PROCEDURE — 36415 COLL VENOUS BLD VENIPUNCTURE: CPT | Mod: PO | Performed by: INTERNAL MEDICINE

## 2024-08-16 PROCEDURE — 84432 ASSAY OF THYROGLOBULIN: CPT | Performed by: INTERNAL MEDICINE

## 2024-08-22 LAB
CLINICAL BIOCHEMIST REVIEW: NORMAL
THYROGLOB SERPL-MCNC: <0.2 NG/ML

## 2024-08-23 ENCOUNTER — PATIENT MESSAGE (OUTPATIENT)
Dept: ENDOCRINOLOGY | Facility: CLINIC | Age: 34
End: 2024-08-23
Payer: COMMERCIAL

## 2024-08-23 DIAGNOSIS — C73 THYROID CANCER: Primary | ICD-10-CM

## 2024-10-04 ENCOUNTER — HOSPITAL ENCOUNTER (OUTPATIENT)
Dept: RADIOLOGY | Facility: OTHER | Age: 34
Discharge: HOME | End: 2024-10-04
Attending: INTERNAL MEDICINE
Payer: COMMERCIAL

## 2024-10-04 DIAGNOSIS — C73 THYROID CANCER: ICD-10-CM

## 2024-10-04 PROCEDURE — 76536 US EXAM OF HEAD AND NECK: CPT | Mod: TC

## 2024-10-04 PROCEDURE — 76536 US EXAM OF HEAD AND NECK: CPT | Mod: 26,,, | Performed by: RADIOLOGY

## 2024-10-07 ENCOUNTER — PATIENT MESSAGE (OUTPATIENT)
Dept: ENDOCRINOLOGY | Facility: CLINIC | Age: 34
End: 2024-10-07
Payer: COMMERCIAL

## 2024-10-10 DIAGNOSIS — E03.9 HYPOTHYROIDISM, UNSPECIFIED TYPE: ICD-10-CM

## 2024-10-10 DIAGNOSIS — C73 THYROID CANCER: Primary | ICD-10-CM

## 2024-10-18 RX ORDER — LIOTHYRONINE SODIUM 5 UG/1
TABLET ORAL
Qty: 90 TABLET | Refills: 3 | Status: SHIPPED | OUTPATIENT
Start: 2024-10-18

## 2024-10-19 ENCOUNTER — LAB VISIT (OUTPATIENT)
Dept: LAB | Facility: HOSPITAL | Age: 34
End: 2024-10-19
Attending: INTERNAL MEDICINE
Payer: COMMERCIAL

## 2024-10-19 DIAGNOSIS — E03.9 HYPOTHYROIDISM, UNSPECIFIED TYPE: ICD-10-CM

## 2024-10-19 DIAGNOSIS — C73 THYROID CANCER: ICD-10-CM

## 2024-10-19 LAB
T3 SERPL-MCNC: 112 NG/DL (ref 60–180)
T4 FREE SERPL-MCNC: 1.24 NG/DL (ref 0.71–1.51)
TSH SERPL DL<=0.005 MIU/L-ACNC: 0.44 UIU/ML (ref 0.4–4)

## 2024-10-19 PROCEDURE — 84480 ASSAY TRIIODOTHYRONINE (T3): CPT | Performed by: INTERNAL MEDICINE

## 2024-10-19 PROCEDURE — 84443 ASSAY THYROID STIM HORMONE: CPT | Performed by: INTERNAL MEDICINE

## 2024-10-19 PROCEDURE — 84439 ASSAY OF FREE THYROXINE: CPT | Performed by: INTERNAL MEDICINE

## 2024-10-19 PROCEDURE — 84432 ASSAY OF THYROGLOBULIN: CPT | Performed by: INTERNAL MEDICINE

## 2024-10-19 PROCEDURE — 86800 THYROGLOBULIN ANTIBODY: CPT | Performed by: INTERNAL MEDICINE

## 2024-10-22 LAB
THRYOGLOBULIN INTERPRETATION: ABNORMAL
THYROGLOB AB SERPL-ACNC: 106 IU/ML
THYROGLOB SERPL-MCNC: <0.1 NG/ML

## 2024-10-28 ENCOUNTER — OFFICE VISIT (OUTPATIENT)
Dept: ENDOCRINOLOGY | Facility: CLINIC | Age: 34
End: 2024-10-28
Payer: COMMERCIAL

## 2024-10-28 DIAGNOSIS — E03.9 HYPOTHYROIDISM, UNSPECIFIED TYPE: ICD-10-CM

## 2024-10-28 DIAGNOSIS — C73 THYROID CANCER: Primary | ICD-10-CM

## 2024-10-28 PROCEDURE — 99214 OFFICE O/P EST MOD 30 MIN: CPT | Mod: 95,,, | Performed by: INTERNAL MEDICINE

## 2024-10-28 PROCEDURE — 1160F RVW MEDS BY RX/DR IN RCRD: CPT | Mod: CPTII,95,, | Performed by: INTERNAL MEDICINE

## 2024-10-28 PROCEDURE — 1159F MED LIST DOCD IN RCRD: CPT | Mod: CPTII,95,, | Performed by: INTERNAL MEDICINE

## 2024-10-28 NOTE — PROGRESS NOTES
The patient location is: [place]    Visit type: audiovisual    Face to Face time with patient: 19  19 minutes of total time spent on the encounter, which includes face to face time and non-face to face time preparing to see the patient (eg, review of tests), Obtaining and/or reviewing separately obtained history, Documenting clinical information in the electronic or other health record, Independently interpreting results (not separately reported) and communicating results to the patient/family/caregiver, or Care coordination (not separately reported).         Each patient to whom he or she provides medical services by telemedicine is:  (1) informed of the relationship between the physician and patient and the respective role of any other health care provider with respect to management of the patient; and (2) notified that he or she may decline to receive medical services by telemedicine and may withdraw from such care at any time.    Notes:       (OB- Dr. Orellana)  CHIEF COMPLAINT: Papillary Thyroid Cancer   34 y.o.  being seen as a f/u. S/P total thyroidectomy 6/13. She received 100 mCi on 9/13 (She received tracer scan prior to determine dose). On synthroid 137 mcg and cytomel 5 mcg 1 tablet daily.   Sleeping better. Takes melatonin. Still having fatigue in evening but slightly better than before.   No Palpitations. No tremors.  No diarrhea or constipation. . Migraines around cycle. No increase from normal. Using a  2-3/week. Also increased step count. Has gained some weight.           FINAL PATHOLOGIC DIAGNOSIS   1. 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   2. THYROID (LEFT LOBE, CLINICAL): NO TUMOR SEEN.   THYROID CANCER CASE SUMMARY   Thyroid gland resection   Procedure: right lobectomy   Specimen integrity: separate right and left lobes   Specimen size: 6.0 cm   Tumor focality: single focus   Tumor laterality: right   Tumor size: 2.0 cm   Histologic type: papillary carcinoma,  tall cell variant   Margins: not involved; closest margin < 0.1cm   Tumor capsule: totally encapsulated   Tumor capsular invasion: minimally invasive   Lymph-vascular invasion: not identified   Extrathyroid extension: not identified   Pathologic stage: I (pT1b NX)     PAST MEDICAL HISTORY/PAST SURGICAL HISTORY: Reviewed in [facility]     SOCIAL HISTORY: No T/A. .     FAMILY HISTORY: No thyroid cancer. Distant relative had hyperthyroidism.     MEDICATIONS/ALLERGIES: The patient's MedCard has been updated and reviewed.         PE: Virtual Visit     Latest Reference Range & Units 10/19/24 09:20 10/19/24 09:24   TSH 0.400 - 4.000 uIU/mL  0.442   T3, Total 60 - 180 ng/dL  112   Free T4 0.71 - 1.51 ng/dL  1.24   Thyroglobulin Interpretation  SEE BELOW    Thyroglobulin Antibody Screen <1.8 IU/mL 106 (H)    Thyroglobulin, Tumor Marker ng/mL <0.1    (H): Data is abnormally high    US SOFT TISSUE HEAD NECK     CLINICAL HISTORY:  Malignant neoplasm of thyroid gland,     TECHNIQUE:  Ultrasound of the thyroid and cervical lymph nodes was performed.     COMPARISON:  09/23/2023     FINDINGS:  The thyroid gland is surgically absent.  No abnormal tissue in the thyroid bed.  Morphologically normal normal sized lymph nodes are seen in the neck as follows: 3 mm short axis are 3 node     2 mm short axis L3 node     3 mm short axis L4 noted.     Impression:     As above       ASSESSMENT/PLAN:   1. 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 TG Ab +. With minimal invasion of capsule but within margins. S/P 100 mCi. Post Tx WBS was normal.  1 year WBS shows no iodine avid disease. AB were increased, so PET scan done 10/14 which was negative.  Thyroglobulin has been relatively stable.  Checked TG via mass spect which was undetectable.  If thyroglobulin antibodies increase, we can check TG via mass spec.       2. Hypothyroidism- See # 1. Symptomatically doing well.  Continue current dose of Synthroid and  Cytomel.    FOLLOWUP: [facility] on [address].   F/U 6 months- TSH, Ft4, T3, Tg,  virtual ok

## 2025-01-10 ENCOUNTER — PATIENT MESSAGE (OUTPATIENT)
Dept: ENDOCRINOLOGY | Facility: CLINIC | Age: 35
End: 2025-01-10
Payer: COMMERCIAL

## 2025-01-10 RX ORDER — LEVOTHYROXINE SODIUM 137 UG/1
137 TABLET ORAL
Qty: 10 TABLET | Refills: 0 | Status: SHIPPED | OUTPATIENT
Start: 2025-01-10

## 2025-01-10 RX ORDER — LIOTHYRONINE SODIUM 5 UG/1
TABLET ORAL
Qty: 10 TABLET | Refills: 0 | Status: SHIPPED | OUTPATIENT
Start: 2025-01-10

## 2025-02-24 ENCOUNTER — PATIENT MESSAGE (OUTPATIENT)
Dept: ENDOCRINOLOGY | Facility: CLINIC | Age: 35
End: 2025-02-24
Payer: COMMERCIAL

## 2025-02-24 RX ORDER — LIOTHYRONINE SODIUM 5 UG/1
TABLET ORAL
Qty: 90 TABLET | Refills: 3 | Status: SHIPPED | OUTPATIENT
Start: 2025-02-24

## 2025-02-24 RX ORDER — LEVOTHYROXINE SODIUM 137 UG/1
137 TABLET ORAL
Qty: 90 TABLET | Refills: 3 | Status: SHIPPED | OUTPATIENT
Start: 2025-02-24

## 2025-03-17 ENCOUNTER — PATIENT MESSAGE (OUTPATIENT)
Dept: ENDOCRINOLOGY | Facility: CLINIC | Age: 35
End: 2025-03-17

## 2025-03-17 ENCOUNTER — E-VISIT (OUTPATIENT)
Dept: ENDOCRINOLOGY | Facility: CLINIC | Age: 35
End: 2025-03-17
Payer: COMMERCIAL

## 2025-03-17 DIAGNOSIS — E03.9 HYPOTHYROIDISM, UNSPECIFIED TYPE: Primary | ICD-10-CM

## 2025-03-17 NOTE — TELEPHONE ENCOUNTER
Do TFTs.  Also sent an E visit to her MyChart..  Have her complete after her labs.    On the E visit if she can also tell us the dose she is taking, if she has missed any doses and if taking by itself.

## 2025-03-18 ENCOUNTER — LAB VISIT (OUTPATIENT)
Dept: LAB | Facility: HOSPITAL | Age: 35
End: 2025-03-18
Payer: COMMERCIAL

## 2025-03-18 DIAGNOSIS — E03.9 HYPOTHYROIDISM, UNSPECIFIED TYPE: ICD-10-CM

## 2025-03-18 LAB
T3 SERPL-MCNC: 112 NG/DL (ref 60–180)
T4 FREE SERPL-MCNC: 1.26 NG/DL (ref 0.71–1.51)
TSH SERPL DL<=0.005 MIU/L-ACNC: 0.67 UIU/ML (ref 0.4–4)

## 2025-03-18 PROCEDURE — 36415 COLL VENOUS BLD VENIPUNCTURE: CPT | Mod: PO | Performed by: INTERNAL MEDICINE

## 2025-03-18 PROCEDURE — 84480 ASSAY TRIIODOTHYRONINE (T3): CPT | Performed by: INTERNAL MEDICINE

## 2025-03-18 PROCEDURE — 84439 ASSAY OF FREE THYROXINE: CPT | Performed by: INTERNAL MEDICINE

## 2025-03-18 PROCEDURE — 84443 ASSAY THYROID STIM HORMONE: CPT | Performed by: INTERNAL MEDICINE

## 2025-03-19 NOTE — PROGRESS NOTES
Patient ID: Idalia Lee is a 34 y.o. female.    Chief Complaint: Medication Management (Entered automatically based on patient selection in [employer].)    The patient initiated a request through [employer] on 3/17/2025 for evaluation and management with a chief complaint of Medication Management (Entered automatically based on patient selection in [employer].)     I evaluated the questionnaire submission on 3/19/25.    [facility] Peq Evisit Medication    3/18/2025 10:02 AM CDT - Filed by Patient   Do you agree to participate in an E-Visit? Yes   If you have any of the following symptoms, please present to your local emergency room or call 911:  I acknowledge   Medication requests for narcotics will not be addressed via an E-Visit.  Please schedule an appointment. I acknowledge   Choose the state of your primary residence [place]   Do you have any of the following pregnancy-related conditions? None   Do you want to address a new or existing medication? I would like to address a medication I currently take   What is the main issue you would like addressed today? Medication management   Would you like to change or continue your medication? Continue medication   What medication would you like to continue?  Levothyroxine;  Liothyronine   Are you taking it as prescribed? Yes    What medical condition is the  medication intended to treat? 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 Yes   Are you having any side effects from the medication? No   Provide any additional information you feel is important. See notes attached   Please attach any relevant images or files File not available.   Are you able to take your vital signs? Yes   Systolic Blood Pressure:    Diastolic Blood Pressure:    Weight: 121   Height: 62   Pulse:    Temperature:    Respiration rate:    Pulse Oxygen:          Encounter Diagnosis   Name Primary?    Hypothyroidism, unspecified type Yes         No orders of the defined types were placed in this encounter.           No follow-ups on file.      E-Visit Time Tracking:

## 2025-03-25 ENCOUNTER — PATIENT MESSAGE (OUTPATIENT)
Dept: ENDOCRINOLOGY | Facility: CLINIC | Age: 35
End: 2025-03-25
Payer: COMMERCIAL

## 2025-04-16 ENCOUNTER — LAB VISIT (OUTPATIENT)
Dept: LAB | Facility: HOSPITAL | Age: 35
End: 2025-04-16
Attending: INTERNAL MEDICINE
Payer: COMMERCIAL

## 2025-04-16 ENCOUNTER — PATIENT MESSAGE (OUTPATIENT)
Dept: ENDOCRINOLOGY | Facility: CLINIC | Age: 35
End: 2025-04-16
Payer: COMMERCIAL

## 2025-04-16 DIAGNOSIS — E03.9 HYPOTHYROIDISM, UNSPECIFIED TYPE: ICD-10-CM

## 2025-04-16 DIAGNOSIS — C73 THYROID CANCER: ICD-10-CM

## 2025-04-16 LAB
T3FREE SERPL-MCNC: 117 NG/DL (ref 60–180)
T4 FREE SERPL-MCNC: 1.33 NG/DL (ref 0.71–1.51)
TSH SERPL-ACNC: 0.19 UIU/ML (ref 0.4–4)

## 2025-04-16 PROCEDURE — 84439 ASSAY OF FREE THYROXINE: CPT

## 2025-04-16 PROCEDURE — 84443 ASSAY THYROID STIM HORMONE: CPT

## 2025-04-16 PROCEDURE — 86800 THYROGLOBULIN ANTIBODY: CPT

## 2025-04-16 PROCEDURE — 84480 ASSAY TRIIODOTHYRONINE (T3): CPT

## 2025-04-16 PROCEDURE — 36415 COLL VENOUS BLD VENIPUNCTURE: CPT | Mod: PO

## 2025-04-17 LAB
ENDOCRINOLOGIST REVIEW: ABNORMAL
THYROGLOB AB SERPL IA-ACNC: 95 IU/ML
THYROGLOB SERPL-MCNC: <0.1 NG/ML

## 2025-04-28 ENCOUNTER — OFFICE VISIT (OUTPATIENT)
Dept: ENDOCRINOLOGY | Facility: CLINIC | Age: 35
End: 2025-04-28
Payer: COMMERCIAL

## 2025-04-28 DIAGNOSIS — E03.9 HYPOTHYROIDISM, UNSPECIFIED TYPE: ICD-10-CM

## 2025-04-28 DIAGNOSIS — C73 THYROID CANCER: Primary | ICD-10-CM

## 2025-04-28 PROCEDURE — 1159F MED LIST DOCD IN RCRD: CPT | Mod: CPTII,95,, | Performed by: INTERNAL MEDICINE

## 2025-04-28 PROCEDURE — 98006 SYNCH AUDIO-VIDEO EST MOD 30: CPT | Mod: 95,,, | Performed by: INTERNAL MEDICINE

## 2025-04-28 PROCEDURE — 1160F RVW MEDS BY RX/DR IN RCRD: CPT | Mod: CPTII,95,, | Performed by: INTERNAL MEDICINE

## 2025-04-28 NOTE — PROGRESS NOTES
The patient location is: [place]    Visit type: audiovisual    Face to Face time with patient: 11  13 minutes of total time spent on the encounter, which includes face to face time and non-face to face time preparing to see the patient (eg, review of tests), Obtaining and/or reviewing separately obtained history, Documenting clinical information in the electronic or other health record, Independently interpreting results (not separately reported) and communicating results to the patient/family/caregiver, or Care coordination (not separately reported).         Each patient to whom he or she provides medical services by telemedicine is:  (1) informed of the relationship between the physician and patient and the respective role of any other health care provider with respect to management of the patient; and (2) notified that he or she may decline to receive medical services by telemedicine and may withdraw from such care at any time.    Notes:       CHIEF COMPLAINT: Papillary Thyroid Cancer   35 y.o.  being seen as a f/u. S/P total thyroidectomy 6/13. She received 100 mCi on 9/13 (She received tracer scan prior to determine dose). On synthroid 137 mcg and cytomel 5 mcg 1 tablet daily.   States started TMS treatment. Fatigue somewhat better. States depression somewhat better. No Palpitations. No tremors. NO heat/cold intolerance. Anxiety somewhat better.           FINAL PATHOLOGIC DIAGNOSIS   1. 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   2. THYROID (LEFT LOBE, CLINICAL): NO TUMOR SEEN.   THYROID CANCER CASE SUMMARY   Thyroid gland resection   Procedure: right lobectomy   Specimen integrity: separate right and left lobes   Specimen size: 6.0 cm   Tumor focality: single focus   Tumor laterality: right   Tumor size: 2.0 cm   Histologic type: papillary carcinoma, tall cell variant   Margins: not involved; closest margin < 0.1cm   Tumor capsule: totally encapsulated   Tumor capsular invasion:  minimally invasive   Lymph-vascular invasion: not identified   Extrathyroid extension: not identified   Pathologic stage: I (pT1b NX)     PAST MEDICAL HISTORY/PAST SURGICAL HISTORY: Reviewed in EPIC     SOCIAL HISTORY: No T/A. .     FAMILY HISTORY: No thyroid cancer. Distant relative had hyperthyroidism.     MEDICATIONS/ALLERGIES: The patient's MedCard has been updated and reviewed.         PE: Virtual Visit     Latest Reference Range & Units 04/16/25 08:25   TSH 0.400 - 4.000 uIU/mL 0.188 (L)   T3, Total 60 - 180 ng/dL 117   Free T4 0.71 - 1.51 ng/dL 1.33   Thyroglobulin Antibody, S <1.8 IU/mL 95 (H)   Thyroglobulin, Tumor Marker, S < or = 33 ng/mL <0.1   (L): Data is abnormally low  (H): Data is abnormally high         ASSESSMENT/PLAN:   1. 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 TG Ab +. With minimal invasion of capsule but within margins. S/P 100 mCi. Post Tx WBS was normal.  1 year WBS shows no iodine avid disease. AB were increased, so PET scan done 10/14 which was negative.  Thyroglobulin has been relatively stable.  Checked TG via mass spect which was undetectable.  If thyroglobulin antibodies increase, we can check TG via mass spec.  Check thyroid ultrasound at follow up      2. Hypothyroidism- See # 1.  We can keep TSH at the lower end of normal.  TSH is suppressed.  Levels have fluctuated in the past.  Continue to monitor and repeat in 3 months.  If TSH still suppressed, can slightly reduce dose    FOLLOWUP: [facility] on [address].   3 months TSH, Ft4, T3  F/U 6 months- TSH, Ft4, T3, Tg, thyroid Ultrasound- virtual ok

## 2025-04-29 ENCOUNTER — PATIENT MESSAGE (OUTPATIENT)
Dept: ENDOCRINOLOGY | Facility: CLINIC | Age: 35
End: 2025-04-29
Payer: COMMERCIAL

## 2025-06-05 DIAGNOSIS — G43.009 MIGRAINE WITHOUT AURA AND WITHOUT STATUS MIGRAINOSUS, NOT INTRACTABLE: ICD-10-CM

## 2025-06-06 RX ORDER — UBROGEPANT 100 MG/1
100 TABLET ORAL
Qty: 48 TABLET | Refills: 3 | Status: SHIPPED | OUTPATIENT
Start: 2025-06-06

## 2025-08-01 ENCOUNTER — LAB VISIT (OUTPATIENT)
Dept: LAB | Facility: HOSPITAL | Age: 35
End: 2025-08-01
Attending: INTERNAL MEDICINE
Payer: COMMERCIAL

## 2025-08-01 DIAGNOSIS — C73 THYROID CANCER: ICD-10-CM

## 2025-08-01 DIAGNOSIS — E03.9 HYPOTHYROIDISM, UNSPECIFIED TYPE: ICD-10-CM

## 2025-08-01 LAB
T3FREE SERPL-MCNC: 98 NG/DL (ref 60–180)
T4 FREE SERPL-MCNC: 1.42 NG/DL (ref 0.71–1.51)
TSH SERPL-ACNC: 0.12 UIU/ML (ref 0.4–4)

## 2025-08-01 PROCEDURE — 84480 ASSAY TRIIODOTHYRONINE (T3): CPT

## 2025-08-01 PROCEDURE — 84443 ASSAY THYROID STIM HORMONE: CPT

## 2025-08-01 PROCEDURE — 84439 ASSAY OF FREE THYROXINE: CPT

## 2025-08-01 PROCEDURE — 36415 COLL VENOUS BLD VENIPUNCTURE: CPT | Mod: PO

## 2025-08-04 ENCOUNTER — RESULTS FOLLOW-UP (OUTPATIENT)
Dept: ENDOCRINOLOGY | Facility: CLINIC | Age: 35
End: 2025-08-04
Payer: COMMERCIAL

## 2025-08-04 DIAGNOSIS — E03.9 HYPOTHYROIDISM, UNSPECIFIED TYPE: Primary | ICD-10-CM

## 2025-08-11 RX ORDER — LEVOTHYROXINE SODIUM 137 UG/1
TABLET ORAL
Qty: 90 TABLET | Refills: 3 | Status: SHIPPED | OUTPATIENT
Start: 2025-08-11